# Patient Record
Sex: MALE | Race: WHITE | NOT HISPANIC OR LATINO | Employment: FULL TIME | URBAN - METROPOLITAN AREA
[De-identification: names, ages, dates, MRNs, and addresses within clinical notes are randomized per-mention and may not be internally consistent; named-entity substitution may affect disease eponyms.]

---

## 2017-02-01 ENCOUNTER — OFFICE VISIT (OUTPATIENT)
Dept: CARDIOLOGY | Facility: CLINIC | Age: 57
End: 2017-02-01

## 2017-02-01 VITALS
HEART RATE: 100 BPM | SYSTOLIC BLOOD PRESSURE: 122 MMHG | DIASTOLIC BLOOD PRESSURE: 98 MMHG | WEIGHT: 315 LBS | HEIGHT: 76 IN | BODY MASS INDEX: 38.36 KG/M2

## 2017-02-01 DIAGNOSIS — G47.33 OBSTRUCTIVE SLEEP APNEA SYNDROME: ICD-10-CM

## 2017-02-01 DIAGNOSIS — I10 ESSENTIAL HYPERTENSION: ICD-10-CM

## 2017-02-01 DIAGNOSIS — I48.0 PAF (PAROXYSMAL ATRIAL FIBRILLATION) (HCC): Primary | ICD-10-CM

## 2017-02-01 PROBLEM — E55.9 VITAMIN D DEFICIENCY: Status: ACTIVE | Noted: 2017-02-01

## 2017-02-01 PROBLEM — G47.30 SLEEP APNEA: Status: ACTIVE | Noted: 2017-02-01

## 2017-02-01 PROBLEM — I83.93 VARICOSE VEINS OF LOWER EXTREMITIES: Status: ACTIVE | Noted: 2017-02-01

## 2017-02-01 PROCEDURE — 99214 OFFICE O/P EST MOD 30 MIN: CPT | Performed by: NURSE PRACTITIONER

## 2017-02-01 PROCEDURE — 93000 ELECTROCARDIOGRAM COMPLETE: CPT | Performed by: NURSE PRACTITIONER

## 2017-02-01 RX ORDER — METOPROLOL SUCCINATE 100 MG/1
100 TABLET, EXTENDED RELEASE ORAL 2 TIMES DAILY
Qty: 180 TABLET | Refills: 3 | Status: SHIPPED | OUTPATIENT
Start: 2017-02-01 | End: 2020-12-17

## 2017-02-01 NOTE — PROGRESS NOTES
Date of Office Visit: 2017  Encounter Provider: HARDY Hugo  Place of Service: [facility] CARDIOLOGY  Patient Name: Ramy Reyez  :1960    Chief Complaint   Patient presents with   • Atrial Fibrillation     f/u   :     HPI: Ramy Reyez is a 56 y.o. male comes in today for follow-up for atrial fibrillation.  He is a patient that has been seen by Dr. Valentin and Dr. Yvon Russo and I am seeing him for the first time today. He has a history of atrial fibrillation.  He was first diagnosed in . At first, it was attempted to keep him in normal sinus rhythm with flecainide.  He had many recurrences of his atrial fibrillation, all in the setting of alcohol use.  He went to rehabilitation in 2014 for his alcohol use.  In , Dr. Russo referred the patient to Dr. Valentin for possible ablation.  It was the decision of Dr. Valentin to discontinue his flecainide as it was not keeping him in normal sinus rhythm.  The patient was asymptomatic in atrial fibrillation and rate control with the treatment he recommended.    Today, he comes in, overall feeling well.  He does have palpitations at times.  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  Last night he had to take a break due to shortness of breath and fatigue but this does not occur frequently.  He has edema in his right lower extremity due to varicose veins.  He is going to have some vein removal.  No extremity and the left leg.  Occasional lightheadedness occurs but does not occur with palpitations or shortness of breath.  He denies chest pain, orthopnea or PND.  He does have more fatigue than usual but he does not exercise.  He has not lost weight as recommended.  He is still alcohol free.  He does use CPAP at night and 100% compliant.  He is not 100% compliant with taking  "evening dose of Toprol.  He reports he misses the dose more than he takes.    Past Medical History   Diagnosis Date   • Abnormal liver function test    • Annual physical exam    • Cardiomyopathy    • CPAP (continuous positive airway pressure) dependence    • Drug therapy    • Genital warts    • Health care maintenance    • Hypertension    • PAF (paroxysmal atrial fibrillation)    • Recovering alcoholic    • Sleep apnea        Past Surgical History   Procedure Laterality Date   • Cyst removal Right      hand           Review of Systems   Constitution: Positive for malaise/fatigue. Negative for fever.   HENT: Negative for ear pain, hearing loss, nosebleeds and sore throat.    Eyes: Negative for double vision, pain, vision loss in left eye, vision loss in right eye and visual disturbance.   Cardiovascular: Positive for leg swelling (right leg only) and palpitations. Negative for claudication.   Respiratory: Negative for cough, snoring and wheezing.    Endocrine: Negative for cold intolerance, heat intolerance and polyuria.   Skin: Negative for color change, itching and rash.   Musculoskeletal: Negative for joint pain, joint swelling and muscle cramps.   Gastrointestinal: Negative for abdominal pain, diarrhea, melena, nausea and vomiting.   Genitourinary: Negative for bladder incontinence and hematuria.   Neurological: Negative for excessive daytime sleepiness, dizziness, light-headedness, paresthesias and seizures.   Psychiatric/Behavioral: Negative for depression. The patient is not nervous/anxious.    All other systems reviewed and are negative.    All other systems reviewed and are negative    No Known Allergies    All aspects of family and social history reviewed.          Objective:     Vitals:    02/01/17 0918   BP: 122/98   BP Location: Right arm   Cuff Size: Large Adult   Pulse: 100   Weight: (!) 319 lb (145 kg)   Height: 76\" (193 cm)     Body mass index is 38.83 kg/(m^2).    PHYSICAL EXAM:  Physical Exam "   Constitutional: He is oriented to person, place, and time. He appears well-developed and well-nourished.   HENT:   Head: Normocephalic and atraumatic.   Neck: Neck supple. No JVD present.   Cardiovascular: Normal heart sounds and intact distal pulses.  An irregularly irregular rhythm present. Tachycardia present.    Pulses:       Carotid pulses are 2+ on the right side, and 2+ on the left side.       Radial pulses are 2+ on the right side, and 2+ on the left side.        Dorsalis pedis pulses are 2+ on the right side, and 2+ on the left side.   Pulmonary/Chest: Effort normal and breath sounds normal. No accessory muscle usage. No respiratory distress. He has no rales.   Abdominal: Soft. Normal appearance and bowel sounds are normal. There is no tenderness.   Musculoskeletal: Normal range of motion. He exhibits no edema.   Neurological: He is alert and oriented to person, place, and time.   Skin: Skin is warm, dry and intact. He is not diaphoretic.   Psychiatric: He has a normal mood and affect. His speech is normal and behavior is normal. Judgment and thought content normal. Cognition and memory are normal.         ECG 12 Lead  Date/Time: 2/1/2017 9:55 AM  Performed by: NATHALIE OLIVER  Authorized by: NATHALIE OLIVER   Comparison: compared with previous ECG from 1/13/2016  Similar to previous ECG  Rhythm: atrial fibrillation  Rate: normal  BPM: 100  ST Segments: ST segments normal  T Waves: T waves normal  QRS axis: normal  Other: no other findings  Clinical impression: abnormal ECG  Comments: Indication: Atrial fibrillation                Assessment:       Diagnosis Plan   1. PAF (paroxysmal atrial fibrillation)     2. Obstructive sleep apnea syndrome     3. Essential hypertension          Orders Placed This Encounter   Procedures   • ECG 12 Lead     This order was created via procedure documentation       Current Outpatient Prescriptions   Medication Sig Dispense Refill   • lisinopril-hydrochlorothiazide  (PRINZIDE,ZESTORETIC) 20-12.5 MG per tablet TAKE ONE TABLET BY MOUTH ONCE DAILY 90 tablet 3   • metoprolol succinate XL (TOPROL-XL) 100 MG 24 hr tablet Take 1 tablet by mouth 2 (Two) Times a Day. 180 tablet 3   • rivaroxaban (XARELTO) 20 MG tablet Take 1 tablet by mouth Daily With Dinner. 90 tablet 3     No current facility-administered medications for this visit.             Plan:       Atrial Fibrillation and Atrial Flutter  Assessment  • The patient has persistent atrial fibrillation  • This is non-valvular in etiology  • The patient's CHADS2-VASc score is 1  • A [id number] score of 1 is considered an intermediate risk for a thromboembolic event  • Rivaroxaban prescribed    Plan  • Continue in atrial fibrillation with rate control  • Continue rivaroxaban for antithrombotic therapy, bleeding issues discussed  • Continue beta blocker for rate control  • Pt not taking Toprol XL at night as prescribed. Encouraged patient to set alarm or set dose at night stand by CPAP, which he is compliant with. He will try to do better. This will also help with hypertension. Encouraged weight loss. Praised for no alcohol and CPAP use.     2. FANG-uses CPAP  3. HTN-encouraged to be compliant with Toprol XL    Follow up in office in 1 year.     As always, it has been a pleasure to participate in this patient's care.      Sincerely,      HARDY Hugo

## 2017-08-21 RX ORDER — LISINOPRIL AND HYDROCHLOROTHIAZIDE 20; 12.5 MG/1; MG/1
TABLET ORAL
Qty: 90 TABLET | Refills: 0 | Status: SHIPPED | OUTPATIENT
Start: 2017-08-21 | End: 2019-01-29 | Stop reason: SDUPTHER

## 2019-01-18 ENCOUNTER — TELEPHONE (OUTPATIENT)
Dept: FAMILY MEDICINE CLINIC | Facility: CLINIC | Age: 59
End: 2019-01-18

## 2019-01-18 NOTE — TELEPHONE ENCOUNTER
Dr. Khan please advise this pt medication list is ok but has a few medical problems you will need to review. Thanks

## 2019-01-18 NOTE — TELEPHONE ENCOUNTER
Ramy Maciel wife Génesis Reyez [birth year] is a [facility] patient and Ramy would like to know if Dr. Khan would accept him as a new patient.

## 2019-01-21 RX ORDER — DILTIAZEM HYDROCHLORIDE 60 MG/1
TABLET, FILM COATED ORAL
Qty: 90 TABLET | Refills: 1 | Status: SHIPPED | OUTPATIENT
Start: 2019-01-21 | End: 2019-06-23 | Stop reason: SDUPTHER

## 2019-01-30 RX ORDER — LISINOPRIL AND HYDROCHLOROTHIAZIDE 20; 12.5 MG/1; MG/1
TABLET ORAL
Qty: 90 TABLET | Refills: 0 | Status: SHIPPED | OUTPATIENT
Start: 2019-01-30 | End: 2019-04-28 | Stop reason: SDUPTHER

## 2019-03-06 RX ORDER — RIVAROXABAN 20 MG/1
TABLET, FILM COATED ORAL
Qty: 90 TABLET | Refills: 0 | Status: SHIPPED | OUTPATIENT
Start: 2019-03-06 | End: 2019-06-01 | Stop reason: SDUPTHER

## 2019-03-18 ENCOUNTER — OFFICE VISIT (OUTPATIENT)
Dept: CARDIOLOGY | Facility: CLINIC | Age: 59
End: 2019-03-18

## 2019-03-18 VITALS
HEIGHT: 76 IN | HEART RATE: 91 BPM | SYSTOLIC BLOOD PRESSURE: 134 MMHG | WEIGHT: 315 LBS | DIASTOLIC BLOOD PRESSURE: 86 MMHG | BODY MASS INDEX: 38.36 KG/M2

## 2019-03-18 DIAGNOSIS — I48.19 PERSISTENT ATRIAL FIBRILLATION (HCC): Primary | ICD-10-CM

## 2019-03-18 DIAGNOSIS — I10 ESSENTIAL HYPERTENSION: ICD-10-CM

## 2019-03-18 DIAGNOSIS — E66.01 CLASS 3 SEVERE OBESITY DUE TO EXCESS CALORIES IN ADULT, UNSPECIFIED BMI, UNSPECIFIED WHETHER SERIOUS COMORBIDITY PRESENT (HCC): ICD-10-CM

## 2019-03-18 DIAGNOSIS — G47.33 OBSTRUCTIVE SLEEP APNEA SYNDROME: ICD-10-CM

## 2019-03-18 PROBLEM — E66.813 CLASS 3 SEVERE OBESITY DUE TO EXCESS CALORIES IN ADULT: Status: ACTIVE | Noted: 2019-03-18

## 2019-03-18 PROCEDURE — 93000 ELECTROCARDIOGRAM COMPLETE: CPT | Performed by: NURSE PRACTITIONER

## 2019-03-18 PROCEDURE — 99214 OFFICE O/P EST MOD 30 MIN: CPT | Performed by: NURSE PRACTITIONER

## 2019-03-18 RX ORDER — LORAZEPAM 1 MG/1
1 TABLET ORAL AS NEEDED
Refills: 0 | COMMUNITY
Start: 2019-03-07

## 2019-03-18 RX ORDER — TRAZODONE HYDROCHLORIDE 100 MG/1
TABLET ORAL
Refills: 1 | COMMUNITY
Start: 2019-02-26

## 2019-03-18 RX ORDER — ARIPIPRAZOLE 10 MG/1
10 TABLET ORAL DAILY
Refills: 4 | COMMUNITY
Start: 2019-02-25 | End: 2019-10-16

## 2019-03-18 RX ORDER — ESCITALOPRAM OXALATE 10 MG/1
10 TABLET ORAL DAILY
COMMUNITY
End: 2019-10-16

## 2019-03-18 NOTE — PROGRESS NOTES
Date of Office Visit: 2019  Encounter Provider: HARDY Galicia  Place of Service: [facility] CARDIOLOGY  Patient Name: Ramy Reyez  :1960    Chief Complaint   Patient presents with   • Paroxysmal atrial fibrillation   :     HPI: Ramy Reyez is a 59 y.o. male who is a patient of Dr. Russo and Dr. Valentin's with history of atrial fibrillation which initially was paroxsymal but now persistent and likely permanent. He was treated with flecainide in the past but was discontinued and decided to just pursue rate control and anticoagulation. At that time he was heavily drinking but did go to rehab and has remained sober. He was last seen by HARDY Luna in 2017.  He was scheduled to see Dr. Valentin in 2018 but was a no-show due to he was living in [place] at that time.  He presents today for routine follow-up.     Says he saw a cardiologist in [place] about 8 months ago and he had an echo and a stress test.  They told him that his EF was around 45% and his stress test was normal.  We will of try to obtain those records.  His last echo in our system was in  at that time his EF was around 40% with a dilated LV, marked biatrial enlargement and mild to moderate MR.    Today he reports that physically he is doing pretty well but mentally he struggles at times.  He denies chest pain, dyspnea, PND, orthopnea or dizziness.  He does have some occasional palpitations as well as some intermittent mild lower extremity edema.    He did start exercising a few months ago and rides a stationary bike for about 15 minutes normally 3 times a week.    He has gained 11 pounds since he last saw us in  and is not doing very well with weight loss.    He does have sleep apnea and says he is compliant with his CPAP.     Past Medical History:   Diagnosis Date   • Abnormal liver function test    • Annual physical exam    • Cardiomyopathy (CMS/HCC)    • CPAP (continuous positive  airway pressure) dependence    • Drug therapy    • Genital warts    • Health care maintenance    • Hypertension    • PAF (paroxysmal atrial fibrillation) (CMS/HCC)    • Recovering alcoholic (CMS/HCC)    • Sleep apnea        Past Surgical History:   Procedure Laterality Date   • CYST REMOVAL Right     hand       Social History     Socioeconomic History   • Marital status:      Spouse name: Not on file   • Number of children: Not on file   • Years of education: Not on file   • Highest education level: Not on file   Social Needs   • Financial resource strain: Not on file   • Food insecurity - worry: Not on file   • Food insecurity - inability: Not on file   • Transportation needs - medical: Not on file   • Transportation needs - non-medical: Not on file   Occupational History   • Not on file   Tobacco Use   • Smoking status: Never Smoker   • Tobacco comment: caffeine use   Substance and Sexual Activity   • Alcohol use: No     Comment: recovery alcoholic   • Drug use: Not on file   • Sexual activity: Not on file   Other Topics Concern   • Not on file   Social History Narrative   • Not on file       Family History   Problem Relation Age of Onset   • Cancer Mother         uterine    • COPD Father    • Diabetes Father    • Depression Father    • Heart attack Father    • Alcohol abuse Sister    • Thyroid disease Other        Review of Systems   Constitution: Negative for chills, fever and malaise/fatigue.   Cardiovascular: Positive for palpitations. Negative for chest pain, dyspnea on exertion, leg swelling, near-syncope, orthopnea, paroxysmal nocturnal dyspnea and syncope.   Respiratory: Negative for cough and shortness of breath.    Hematologic/Lymphatic: Negative.    Musculoskeletal: Negative for joint pain, joint swelling and myalgias.   Gastrointestinal: Negative for abdominal pain, diarrhea, melena, nausea and vomiting.   Genitourinary: Negative for frequency and hematuria.   Neurological: Negative for  "light-headedness, numbness, paresthesias and seizures.   Psychiatric/Behavioral: Positive for depression. The patient is nervous/anxious.    Allergic/Immunologic: Negative.    All other systems reviewed and are negative.      No Known Allergies      Current Outpatient Medications:   •  ARIPiprazole (ABILIFY) 10 MG tablet, Take 10 mg by mouth Daily., Disp: , Rfl: 4  •  diltiaZEM (CARDIZEM) 60 MG tablet, TAKE 1 TABLET BY MOUTH 3 TIMES A DAY, Disp: 90 tablet, Rfl: 1  •  escitalopram (LEXAPRO) 10 MG tablet, Take 10 mg by mouth Daily., Disp: , Rfl:   •  lisinopril-hydrochlorothiazide (PRINZIDE,ZESTORETIC) 20-12.5 MG per tablet, TAKE ONE TABLET BY MOUTH DAILY, Disp: 90 tablet, Rfl: 0  •  LORazepam (ATIVAN) 1 MG tablet, Take 1 mg by mouth As Needed., Disp: , Rfl: 0  •  metoprolol succinate XL (TOPROL-XL) 100 MG 24 hr tablet, Take 1 tablet by mouth 2 (Two) Times a Day., Disp: 180 tablet, Rfl: 3  •  traZODone (DESYREL) 100 MG tablet, TAKE 2 TABLET BY MOUTH EVERYDAY AT BEDTIME, Disp: , Rfl: 1  •  XARELTO 20 MG tablet, TAKE 1 TABLET BY MOUTH DAILY WITH DINNER., Disp: 90 tablet, Rfl: 0      Objective:     Vitals:    03/18/19 0916   BP: 134/86   Pulse: 91   Weight: (!) 150 kg (330 lb)   Height: 193 cm (75.98\")     Body mass index is 40.19 kg/m².    PHYSICAL EXAM:    Vitals Reviewed.   General Appearance: No acute distress, obese.   Eyes: Conjunctiva and lids: No erythema, swelling, or discharge. Sclera non-icteric.   HENT: Atraumatic, normocephalic. External eyes, ears, and nose normal.   Respiratory: No signs of respiratory distress. Respiration rhythm and depth normal.   Clear to auscultation. No rales, crackles, rhonchi, or wheezing auscultated.   Cardiovascular:  Jugular Venous Pressure: Normal  Heart Rate and Rhythm: Irregularly, irregular. Heart Sounds: Normal S1 and S2. No S3 or S4 noted.  Murmurs: No murmurs noted. No rubs, thrills, or gallops.   Arterial Pulses:  Posterior tibialis and dorsalis pedis pulses normal. "   Lower Extremities: Trace pre tibial edema, R>L.  Gastrointestinal:  Abdomen soft, obese.    Musculoskeletal: Normal movement of extremities  Skin and Nails: General appearance normal. No pallor, cyanosis, diaphoresis. Skin temperature normal. No clubbing of fingernails.   Psychiatric: Patient alert and oriented to person, place, and time. Speech and behavior appropriate. Normal mood and affect.       ECG 12 Lead  Date/Time: 3/18/2019 9:54 AM  Performed by: Zora Oneill APRN  Authorized by: Zora Oneill APRN   Comparison: compared with previous ECG from 2/1/2017  Similar to previous ECG  Rhythm: atrial fibrillation  BPM: 91    Clinical impression: abnormal EKG              Assessment:       Diagnosis Plan   1. Persistent atrial fibrillation (CMS/[facility])  Holter Monitor - 24 Hour   2. Essential hypertension     3. Obstructive sleep apnea syndrome     4. Class 3 severe obesity due to excess calories in adult, unspecified BMI, unspecified whether serious comorbidity present (CMS/[facility])            Plan:       1. Atrial Fibrillation and Atrial Flutter  Assessment  • The patient has persistent atrial fibrillation  • The patient's CHADS2-VASc score is 1  • A [id number] score of 1 is considered an intermediate risk for a thromboembolic event  • Rivaroxaban prescribed  • Afib, persistent, likely permanent. HR 91 today. He has not had a monitor to evaluate for HR control. Will do a 24 Hr Holter.    Rivaroxaban for AC, no bleeding issues.     Will get records from [place].     He says EF 45% by echo 8 months ago, which is actually a little better than 2008-he is on ACE, diuretic and BB with no symptoms of heart failure.    He is on low dose dilt TID but says he really has minimal trouble remembering to take it TID so will leave it for now since his regimen seems to be working okay.     Plan  • Continue in atrial fibrillation with rate control  • Continue rivaroxaban for antithrombotic therapy, bleeding issues discussed  •  Continue beta blocker and diltiazem for rate control    2. HTN, controlled.     3. FANG, compliant with CPAP.    4. For the problem of overweight/obesity, we discussed the importance of lifestyle measures and strategies for weight loss, such as improved nutrition, regular exercise and sleep hygiene.      Follow up with Dr. Valentin in 1 year or sooner should the need arise. Will call with Holter results when available.     As always, it has been a pleasure to participate in your patient's care.      Sincerely,         HARDY Peterson

## 2019-04-29 RX ORDER — LISINOPRIL AND HYDROCHLOROTHIAZIDE 20; 12.5 MG/1; MG/1
TABLET ORAL
Qty: 90 TABLET | Refills: 0 | Status: SHIPPED | OUTPATIENT
Start: 2019-04-29 | End: 2019-07-27 | Stop reason: SDUPTHER

## 2019-06-03 RX ORDER — RIVAROXABAN 20 MG/1
TABLET, FILM COATED ORAL
Qty: 90 TABLET | Refills: 0 | Status: SHIPPED | OUTPATIENT
Start: 2019-06-03 | End: 2019-08-20 | Stop reason: SDUPTHER

## 2019-06-24 RX ORDER — DILTIAZEM HYDROCHLORIDE 60 MG/1
TABLET, FILM COATED ORAL
Qty: 90 TABLET | Refills: 1 | Status: SHIPPED | OUTPATIENT
Start: 2019-06-24 | End: 2019-09-01 | Stop reason: SDUPTHER

## 2019-07-29 RX ORDER — LISINOPRIL AND HYDROCHLOROTHIAZIDE 20; 12.5 MG/1; MG/1
TABLET ORAL
Qty: 90 TABLET | Refills: 0 | Status: SHIPPED | OUTPATIENT
Start: 2019-07-29 | End: 2019-11-01 | Stop reason: SDUPTHER

## 2019-08-21 RX ORDER — RIVAROXABAN 20 MG/1
TABLET, FILM COATED ORAL
Qty: 90 TABLET | Refills: 0 | Status: SHIPPED | OUTPATIENT
Start: 2019-08-21 | End: 2019-11-15 | Stop reason: SDUPTHER

## 2019-09-03 RX ORDER — DILTIAZEM HYDROCHLORIDE 60 MG/1
TABLET, FILM COATED ORAL
Qty: 90 TABLET | Refills: 1 | Status: SHIPPED | OUTPATIENT
Start: 2019-09-03 | End: 2019-12-04 | Stop reason: SDUPTHER

## 2019-10-16 ENCOUNTER — OFFICE VISIT (OUTPATIENT)
Dept: FAMILY MEDICINE CLINIC | Facility: CLINIC | Age: 59
End: 2019-10-16

## 2019-10-16 VITALS
DIASTOLIC BLOOD PRESSURE: 86 MMHG | RESPIRATION RATE: 16 BRPM | WEIGHT: 315 LBS | SYSTOLIC BLOOD PRESSURE: 110 MMHG | BODY MASS INDEX: 38.36 KG/M2 | HEIGHT: 76 IN | TEMPERATURE: 98.1 F | OXYGEN SATURATION: 98 % | HEART RATE: 74 BPM

## 2019-10-16 DIAGNOSIS — R39.11 URINARY HESITANCY: ICD-10-CM

## 2019-10-16 DIAGNOSIS — Z00.00 ANNUAL PHYSICAL EXAM: Primary | ICD-10-CM

## 2019-10-16 DIAGNOSIS — Z12.11 SCREENING FOR COLON CANCER: ICD-10-CM

## 2019-10-16 DIAGNOSIS — R73.9 HYPERGLYCEMIA: ICD-10-CM

## 2019-10-16 DIAGNOSIS — Z86.79 HISTORY OF HEART FAILURE: ICD-10-CM

## 2019-10-16 DIAGNOSIS — R32 URINARY INCONTINENCE, UNSPECIFIED TYPE: ICD-10-CM

## 2019-10-16 DIAGNOSIS — I48.20 ATRIAL FIBRILLATION, CHRONIC (HCC): ICD-10-CM

## 2019-10-16 DIAGNOSIS — R53.83 FATIGUE, UNSPECIFIED TYPE: ICD-10-CM

## 2019-10-16 DIAGNOSIS — R39.15 URINARY URGENCY: ICD-10-CM

## 2019-10-16 DIAGNOSIS — R35.89 POLYURIA: ICD-10-CM

## 2019-10-16 PROBLEM — F32.A DEPRESSION: Status: ACTIVE | Noted: 2019-10-16

## 2019-10-16 PROCEDURE — 99214 OFFICE O/P EST MOD 30 MIN: CPT | Performed by: FAMILY MEDICINE

## 2019-10-16 RX ORDER — DESVENLAFAXINE 100 MG/1
100 TABLET, EXTENDED RELEASE ORAL DAILY
COMMUNITY
End: 2021-01-19

## 2019-10-16 NOTE — PATIENT INSTRUCTIONS
Lab work and urinalysis ordered, will call with results. Please be fasted for 8 hours prior to getting lab work done.   Cologard ordered.   Will attempt to obtain most recent echocardiogram results.   Follow up with your cardiologist as scheduled.  Return to clinic in 6 months for routine follow up.

## 2019-10-16 NOTE — PROGRESS NOTES
Subjective   Ramy Reyez is a 59 y.o. male.     No chief complaint on file.      History of Present Illness   Patient has a PMH of morbid obesity, chronic atrial fibrillation, CHF (EF 40% in 2008, with dilated and hypertrophic LV, markedly dilated RA and LA, mild-mod increased PAP), alcoholism, depression and bipolar disorder.     He has been on psychiatric medications for many years. Had a relapse of alcoholism a few weeks ago. He had been sober for 8 months prior to that.   He recently started an IOP program at [facility] last week.  His Psychiatrist is Nolvia Ruiz, who he sees once every month or two, but lately he has been seeing him weekly. He also sees a therapist every two weeks - Bladimir Walters.     He notified his psychiatrist of lethargy for the past few months. He believes it is work related. Reports a lot of stress at work and was told his position would be eliminated by the end of the year, and that his work performance had been falling. So he is off on short term disability right now.     He sees his cardiologist every 6 months to once a year.  His last echocardiogram was done in [place] several years ago and report was faxed to his current cardiologist, per patient.   Admits to CP- a midsternal, mild, dull, annoying pain that seems to happen randomly, at rest or when active, for the past few months. No SOB, diaphoresis, tingling/numbness, aggravating or alleviating factors. Denies history of reflux.  Unsure when his last stress test was. Declines any further testing at this time due to financial concerns. Requested that patient keep track of the pain and if he notices any change or worsening of the pain to be seen immediately. Patient verbalized understanding.     He complains of polyuria, urgency, urinating small amounts, and urinary incontinence. This has been going on for a long time. In the past he would change his clothes at work, but now he takes precaution by using the restroom frequently and  he intentionally does not drink much water.     He is from [place], [place], has lived in [place] for the past 13 years. He is a . He lives with his wife at home- they have one daughter age 20 yrs in college at [employer].     He has never smoked. No recent illicit drug use- years ago.   His mother passed away at age 54 yrs due to uterine cancer.   Father: DM, depression, may have had heart disease/MI but patient not sure because his father was not a reliable source. He lived to be in his 80's.    He had colonoscopy 5-6 years ago- recommended repeat in 10 years. He is interested in cologard.    The following portions of the patient's history were reviewed and updated as appropriate: allergies, current medications, past family history, past medical history, past social history, past surgical history and problem list.    Review of Systems   Constitutional: Positive for activity change (Lethargic for months. Lots of sleeping. ) and fatigue. Negative for fever.   HENT: Negative for congestion, sinus pressure, sore throat, swollen glands and trouble swallowing.    Respiratory: Positive for chest tightness (When he has anxiety).    Cardiovascular: Positive for chest pain, palpitations (Occasionally) and leg swelling.   Gastrointestinal: Positive for nausea (During panic attacks). Negative for abdominal pain, constipation, diarrhea, vomiting, GERD and indigestion.   Endocrine: Positive for polyuria (for the past year).   Genitourinary: Positive for frequency, urgency and urinary incontinence (for the past year). Negative for difficulty urinating.   Musculoskeletal: Negative for back pain.   Neurological: Negative for dizziness and headache.   Psychiatric/Behavioral: Negative for depressed mood.       Objective   Vitals:    10/16/19 1245   BP: 110/86   Pulse: 74   Resp: 16   Temp: 98.1 °F (36.7 °C)   SpO2: 98%       Physical Exam   Constitutional: He appears well-developed and well-nourished. No  distress.   Pleasant, morbidly obese male.   HENT:   Head: Normocephalic.   Mouth/Throat: Oropharynx is clear and moist.   Eyes: Conjunctivae and EOM are normal. Pupils are equal, round, and reactive to light.   Neck: Normal range of motion. Neck supple.   Cardiovascular: Normal rate, normal heart sounds and intact distal pulses.   No murmur heard.  Rhythm is irregularly irregular.   1 to 2+ pitting edema bilaterally, up to the knees.  Venous stasis changes bilaterally.   Pulmonary/Chest: Effort normal and breath sounds normal. No respiratory distress. He has no wheezes. He has no rales.   Abdominal: Soft. Bowel sounds are normal. He exhibits no distension. There is no tenderness. There is no rebound and no guarding.   Musculoskeletal: Normal range of motion.   Lymphadenopathy:     He has no cervical adenopathy.   Neurological: He is alert.   Skin: Skin is warm and dry. Capillary refill takes less than 2 seconds.   Psychiatric: He has a normal mood and affect.   Nursing note and vitals reviewed.        Assessment/Plan   Diagnoses and all orders for this visit:    Annual physical exam  -     CBC & Differential; Future  -     Comprehensive Metabolic Panel; Future  -     Lipid Panel; Future  -     Hepatitis C Antibody; Future  -     Hepatitis C Antibody  -     Lipid Panel  -     Comprehensive Metabolic Panel  -     CBC & Differential    Urinary hesitancy  -     PSA Screen; Future  -     Urinalysis With Culture If Indicated -; Future  -     Urinalysis With Culture If Indicated -  -     PSA Screen    Hyperglycemia  -     Hemoglobin A1c; Future  -     Hemoglobin A1c    Fatigue, unspecified type  -     TSH; Future  -     TSH    Screening for colon cancer  -     Cologuard - Stool, Per Rectum; Future  -     Cologuard - Stool, Per Rectum    Polyuria  -     Urinalysis With Culture If Indicated -; Future  -     Urinalysis With Culture If Indicated -    History of heart failure    Atrial fibrillation, chronic    Urinary  urgency    Urinary incontinence, unspecified type    Other orders  -     Cancel: Tdap Vaccine Greater Than or Equal To 8yo IM            Patient Instructions   Lab work and urinalysis ordered, will call with results. Please be fasted for 8 hours prior to getting lab work done.   Cologard ordered.   Will attempt to obtain most recent echocardiogram results.   Follow up with your cardiologist as scheduled.  Return to clinic in 6 months for routine follow up.

## 2019-10-17 LAB
ALBUMIN SERPL-MCNC: 4.8 G/DL (ref 3.5–5.2)
ALBUMIN/GLOB SERPL: 2.4 G/DL
ALP SERPL-CCNC: 65 U/L (ref 39–117)
ALT SERPL-CCNC: 28 U/L (ref 1–41)
APPEARANCE UR: CLEAR
AST SERPL-CCNC: 25 U/L (ref 1–40)
BACTERIA #/AREA URNS HPF: NORMAL /HPF
BASOPHILS # BLD AUTO: (no result) 10*3/UL
BILIRUB SERPL-MCNC: 1 MG/DL (ref 0.2–1.2)
BILIRUB UR QL STRIP: NEGATIVE
BUN SERPL-MCNC: 12 MG/DL (ref 8–23)
BUN/CREAT SERPL: 12.1 (ref 7–25)
CALCIUM SERPL-MCNC: 9.6 MG/DL (ref 8.6–10.5)
CHLORIDE SERPL-SCNC: 100 MMOL/L (ref 98–107)
CHOLEST SERPL-MCNC: 129 MG/DL (ref 0–200)
CO2 SERPL-SCNC: 28.6 MMOL/L (ref 22–29)
COLOR UR: YELLOW
CREAT SERPL-MCNC: 0.99 MG/DL (ref 0.76–1.27)
DIFFERENTIAL COMMENT: ABNORMAL
EOSINOPHIL # BLD AUTO: (no result) 10*3/UL
EOSINOPHIL # BLD MANUAL: 0.07 10*3/MM3 (ref 0–0.4)
EOSINOPHIL NFR BLD AUTO: (no result) %
EOSINOPHIL NFR BLD MANUAL: 1 % (ref 0.3–6.2)
EPI CELLS #/AREA URNS HPF: NORMAL /HPF
ERYTHROCYTE [DISTWIDTH] IN BLOOD BY AUTOMATED COUNT: 13.1 % (ref 12.3–15.4)
GLOBULIN SER CALC-MCNC: 2 GM/DL
GLUCOSE SERPL-MCNC: 93 MG/DL (ref 65–99)
GLUCOSE UR QL: NEGATIVE
HBA1C MFR BLD: 5.9 % (ref 4.8–5.6)
HCT VFR BLD AUTO: 48.6 % (ref 37.5–51)
HCV AB S/CO SERPL IA: <0.1 S/CO RATIO (ref 0–0.9)
HDLC SERPL-MCNC: 42 MG/DL (ref 40–60)
HGB BLD-MCNC: 16.4 G/DL (ref 13–17.7)
HGB UR QL STRIP: NEGATIVE
KETONES UR QL STRIP: NEGATIVE
LDLC SERPL CALC-MCNC: 69 MG/DL (ref 0–100)
LEUKOCYTE ESTERASE UR QL STRIP: NEGATIVE
LYMPHOCYTES # BLD AUTO: (no result) 10*3/UL
LYMPHOCYTES # BLD MANUAL: 2.59 10*3/MM3 (ref 0.7–3.1)
LYMPHOCYTES NFR BLD AUTO: (no result) %
LYMPHOCYTES NFR BLD MANUAL: 38.6 % (ref 19.6–45.3)
MCH RBC QN AUTO: 30.8 PG (ref 26.6–33)
MCHC RBC AUTO-ENTMCNC: 33.7 G/DL (ref 31.5–35.7)
MCV RBC AUTO: 91.2 FL (ref 79–97)
MICRO URNS: NORMAL
MICRO URNS: NORMAL
MONOCYTES # BLD MANUAL: 0.27 10*3/MM3 (ref 0.1–0.9)
MONOCYTES NFR BLD AUTO: (no result) %
MONOCYTES NFR BLD MANUAL: 4 % (ref 5–12)
MUCOUS THREADS URNS QL MICRO: PRESENT /HPF
NEUTROPHILS # BLD MANUAL: 3.78 10*3/MM3 (ref 1.7–7)
NEUTROPHILS NFR BLD AUTO: (no result) %
NEUTROPHILS NFR BLD MANUAL: 56.4 % (ref 42.7–76)
NITRITE UR QL STRIP: NEGATIVE
PH UR STRIP: 5.5 [PH] (ref 5–7.5)
PLATELET # BLD AUTO: 149 10*3/MM3 (ref 140–450)
PLATELET BLD QL SMEAR: ABNORMAL
POTASSIUM SERPL-SCNC: 4.2 MMOL/L (ref 3.5–5.2)
PROT SERPL-MCNC: 6.8 G/DL (ref 6–8.5)
PROT UR QL STRIP: NEGATIVE
PSA SERPL-MCNC: 0.67 NG/ML (ref 0–4)
RBC # BLD AUTO: 5.33 10*6/MM3 (ref 4.14–5.8)
RBC #/AREA URNS HPF: NORMAL /HPF
RBC MORPH BLD: ABNORMAL
SODIUM SERPL-SCNC: 142 MMOL/L (ref 136–145)
SP GR UR: 1.01 (ref 1–1.03)
TRIGL SERPL-MCNC: 92 MG/DL (ref 0–150)
TSH SERPL DL<=0.005 MIU/L-ACNC: 3.94 UIU/ML (ref 0.27–4.2)
URINALYSIS REFLEX: NORMAL
UROBILINOGEN UR STRIP-MCNC: 0.2 MG/DL (ref 0.2–1)
VLDLC SERPL CALC-MCNC: 18.4 MG/DL
WBC # BLD AUTO: 6.71 10*3/MM3 (ref 3.4–10.8)
WBC #/AREA URNS HPF: NORMAL /HPF

## 2019-10-28 ENCOUNTER — TELEPHONE (OUTPATIENT)
Dept: FAMILY MEDICINE CLINIC | Facility: CLINIC | Age: 59
End: 2019-10-28

## 2019-10-28 NOTE — TELEPHONE ENCOUNTER
Patient is calling to see if his cologuard results are back yet. His phone number is   [phone].

## 2019-10-28 NOTE — TELEPHONE ENCOUNTER
Pt has been called [facility], Advised pt Per Dr. Blade Cullen is Negative. Pt will call back with any questions.

## 2019-11-01 RX ORDER — LISINOPRIL AND HYDROCHLOROTHIAZIDE 20; 12.5 MG/1; MG/1
TABLET ORAL
Qty: 90 TABLET | Refills: 0 | Status: SHIPPED | OUTPATIENT
Start: 2019-11-01 | End: 2020-01-24

## 2019-11-15 RX ORDER — RIVAROXABAN 20 MG/1
TABLET, FILM COATED ORAL
Qty: 90 TABLET | Refills: 0 | Status: SHIPPED | OUTPATIENT
Start: 2019-11-15 | End: 2020-02-17

## 2019-12-04 RX ORDER — DILTIAZEM HYDROCHLORIDE 60 MG/1
TABLET, FILM COATED ORAL
Qty: 90 TABLET | Refills: 1 | Status: SHIPPED | OUTPATIENT
Start: 2019-12-04 | End: 2020-03-02

## 2020-01-24 RX ORDER — LISINOPRIL AND HYDROCHLOROTHIAZIDE 20; 12.5 MG/1; MG/1
TABLET ORAL
Qty: 90 TABLET | Refills: 0 | Status: SHIPPED | OUTPATIENT
Start: 2020-01-24 | End: 2020-04-09

## 2020-02-17 RX ORDER — RIVAROXABAN 20 MG/1
TABLET, FILM COATED ORAL
Qty: 90 TABLET | Refills: 0 | Status: SHIPPED | OUTPATIENT
Start: 2020-02-17 | End: 2020-05-19

## 2020-03-02 RX ORDER — DILTIAZEM HYDROCHLORIDE 60 MG/1
TABLET, FILM COATED ORAL
Qty: 90 TABLET | Refills: 5 | Status: SHIPPED | OUTPATIENT
Start: 2020-03-02 | End: 2020-06-26

## 2020-03-20 RX ORDER — METOPROLOL TARTRATE 100 MG/1
TABLET ORAL
Qty: 180 TABLET | Refills: 0 | Status: SHIPPED | OUTPATIENT
Start: 2020-03-20 | End: 2020-06-11

## 2020-04-09 RX ORDER — LISINOPRIL AND HYDROCHLOROTHIAZIDE 20; 12.5 MG/1; MG/1
TABLET ORAL
Qty: 90 TABLET | Refills: 0 | Status: SHIPPED | OUTPATIENT
Start: 2020-04-09 | End: 2020-07-02

## 2020-05-19 RX ORDER — RIVAROXABAN 20 MG/1
TABLET, FILM COATED ORAL
Qty: 90 TABLET | Refills: 1 | Status: SHIPPED | OUTPATIENT
Start: 2020-05-19 | End: 2020-11-18 | Stop reason: SDUPTHER

## 2020-06-03 ENCOUNTER — TELEPHONE (OUTPATIENT)
Dept: FAMILY MEDICINE CLINIC | Facility: CLINIC | Age: 60
End: 2020-06-03

## 2020-06-03 DIAGNOSIS — R73.9 HYPERGLYCEMIA: Primary | ICD-10-CM

## 2020-06-04 ENCOUNTER — LAB (OUTPATIENT)
Dept: LAB | Facility: HOSPITAL | Age: 60
End: 2020-06-04

## 2020-06-04 ENCOUNTER — RESULTS ENCOUNTER (OUTPATIENT)
Dept: FAMILY MEDICINE CLINIC | Facility: CLINIC | Age: 60
End: 2020-06-04

## 2020-06-04 DIAGNOSIS — R73.9 HYPERGLYCEMIA: ICD-10-CM

## 2020-06-04 LAB — HBA1C MFR BLD: 5.72 % (ref 4.8–5.6)

## 2020-06-04 PROCEDURE — 83036 HEMOGLOBIN GLYCOSYLATED A1C: CPT | Performed by: FAMILY MEDICINE

## 2020-06-04 PROCEDURE — 36415 COLL VENOUS BLD VENIPUNCTURE: CPT | Performed by: FAMILY MEDICINE

## 2020-06-11 RX ORDER — METOPROLOL TARTRATE 100 MG/1
TABLET ORAL
Qty: 180 TABLET | Refills: 0 | Status: SHIPPED | OUTPATIENT
Start: 2020-06-11 | End: 2020-09-08

## 2020-06-17 ENCOUNTER — OFFICE VISIT (OUTPATIENT)
Dept: FAMILY MEDICINE CLINIC | Facility: CLINIC | Age: 60
End: 2020-06-17

## 2020-06-17 VITALS
BODY MASS INDEX: 38.36 KG/M2 | OXYGEN SATURATION: 98 % | SYSTOLIC BLOOD PRESSURE: 127 MMHG | HEART RATE: 74 BPM | DIASTOLIC BLOOD PRESSURE: 85 MMHG | TEMPERATURE: 95.6 F | WEIGHT: 315 LBS | HEIGHT: 76 IN

## 2020-06-17 DIAGNOSIS — K64.9 HEMORRHOIDS, UNSPECIFIED HEMORRHOID TYPE: ICD-10-CM

## 2020-06-17 DIAGNOSIS — R73.9 HYPERGLYCEMIA: Primary | ICD-10-CM

## 2020-06-17 DIAGNOSIS — F33.41 RECURRENT MAJOR DEPRESSIVE DISORDER, IN PARTIAL REMISSION (HCC): ICD-10-CM

## 2020-06-17 DIAGNOSIS — E66.01 CLASS 3 SEVERE OBESITY DUE TO EXCESS CALORIES WITH BODY MASS INDEX (BMI) OF 40.0 TO 44.9 IN ADULT, UNSPECIFIED WHETHER SERIOUS COMORBIDITY PRESENT (HCC): ICD-10-CM

## 2020-06-17 DIAGNOSIS — F41.9 ANXIETY: ICD-10-CM

## 2020-06-17 PROCEDURE — 99213 OFFICE O/P EST LOW 20 MIN: CPT | Performed by: FAMILY MEDICINE

## 2020-06-17 RX ORDER — HYDROCORTISONE 25 MG/G
CREAM TOPICAL
Qty: 28 G | Refills: 1 | Status: SHIPPED | OUTPATIENT
Start: 2020-06-17

## 2020-06-17 NOTE — PATIENT INSTRUCTIONS
Patient Instructions    Hydrocortisone cream prescribed.  Keep up the good work with walking and eating healthy!  Follow up with psychiatrist as scheduled next week.   Recommend Shingrix vaccination- can get at pharmacy  Return to clinic in 6 months for annual exam, sooner if needed.

## 2020-06-17 NOTE — PROGRESS NOTES
"Subjective   Ramy Reyez is a 60 y.o. male.     Chief Complaint   Patient presents with   • Hypertension     F/U       History of Present Illness   Ramy presents for follow up for hyperglycemia and morbid obesity.  He has successfully lost 13 lbs by walking and making healthier diet choices. His Hgb A1c decreased from 5.90 to 5.72.  Ramy lost his job due to pandemic and is and getting \"very little\" unemployment. No current job prospects.   He suffers from depression so he has been down lately. No SI. He is sleeping less than normal- has hard time falling asleep. Feeling overwhelmed also with anxiety.  Denies CP, SOB.  He walks 1.5 miles about 3 times per week.   He sees a psychiatrist, he has an apt next week. Has been taking his meds regularly. He has taken Ativan on average TID for up to 3 years.     He states his hemorrhoids have been bothering him and he has had BRBPR. He denies pain.   Had colonoscopy within the past 5 years and had negative cologard 10/2019.    Past Medical History:   Diagnosis Date   • Abnormal liver function test    • Annual physical exam    • Cardiomyopathy (CMS/HCC)    • CPAP (continuous positive airway pressure) dependence    • Depression    • Drug therapy    • Genital warts    • Health care maintenance    • Hypertension    • PAF (paroxysmal atrial fibrillation) (CMS/HCC)    • Recovering alcoholic (CMS/HCC)    • Sleep apnea      Past Surgical History:   Procedure Laterality Date   • CYST REMOVAL Right     hand     Social History     Tobacco Use   • Smoking status: Never Smoker   • Smokeless tobacco: Never Used   • Tobacco comment: caffeine use   Substance Use Topics   • Alcohol use: No     Comment: recovery alcoholic   • Drug use: Not on file     Family History   Problem Relation Age of Onset   • Cancer Mother         uterine    • COPD Father    • Diabetes Father    • Depression Father    • Heart attack Father    • Alcohol abuse Sister    • Thyroid disease Other        Review of " "Systems   Constitutional: Negative for activity change, appetite change, chills, fatigue, fever, unexpected weight gain and unexpected weight loss.   Respiratory: Negative for cough, chest tightness and shortness of breath.    Cardiovascular: Negative for chest pain, palpitations and leg swelling.   Gastrointestinal: Negative for abdominal pain, constipation and diarrhea.   Neurological: Negative for dizziness, numbness and headache.   Psychiatric/Behavioral: Positive for sleep disturbance and depressed mood. Negative for suicidal ideas. The patient is nervous/anxious.        Objective   /85   Pulse 74   Temp 95.6 °F (35.3 °C) (Tympanic)   Ht 193 cm (75.98\")   Wt (!) 149 kg (329 lb 8 oz)   SpO2 98%   BMI 40.12 kg/m²     Physical Exam   Constitutional: He appears well-developed and well-nourished. No distress.   Very pleasant, morbidly obese male.   HENT:   Head: Normocephalic and atraumatic.   Right Ear: External ear normal.   Left Ear: External ear normal.   Nose: Nose normal.   Mouth/Throat: Oropharynx is clear and moist.   Eyes: Conjunctivae are normal.   Cardiovascular: Normal rate, regular rhythm, normal heart sounds and intact distal pulses.   Pulmonary/Chest: Effort normal and breath sounds normal. No stridor. No respiratory distress. He has no wheezes. He has no rales.   Neurological: He is alert.   Psychiatric: He has a normal mood and affect.   Vitals reviewed.      Procedures    Assessment/Plan   Ramy was seen today for hypertension.    Diagnoses and all orders for this visit:    Hyperglycemia    Class 3 severe obesity due to excess calories with body mass index (BMI) of 40.0 to 44.9 in adult, unspecified whether serious comorbidity present (CMS/[facility])    Hemorrhoids, unspecified hemorrhoid type  -     Hydrocortisone, Perianal, (ANUSOL-HC) 2.5 % rectal cream; Apply sparingly to hemorrhoids up to twice daily.    Anxiety    Recurrent major depressive disorder, in partial remission (CMS/[facility])     "      Hydrocortisone cream prescribed for hemorrhoids.  Encouraged him to keep up the good work with walking and eating healthy- recommended increasing frequency or walks to 5+ days/week  He will follow up with psychiatrist as scheduled next week. Recommended he discuss possibility of weaning off Ativan with his psychiatrist due to dependency risk and increased risk in the elderly- He is agreeable to this.  Recommend Shingrix vaccination- can get at pharmacy  Return to clinic in 6 months for annual exam, sooner if needed.         Patient Instructions    Hydrocortisone cream prescribed.  Keep up the good work with walking and eating healthy!  Follow up with psychiatrist as scheduled next week.   Recommend Shingrix vaccination- can get at pharmacy  Return to clinic in 6 months for annual exam, sooner if needed.

## 2020-06-24 PROBLEM — F41.9 ANXIETY: Status: ACTIVE | Noted: 2020-06-24

## 2020-06-26 RX ORDER — DILTIAZEM HYDROCHLORIDE 60 MG/1
TABLET, FILM COATED ORAL
Qty: 270 TABLET | Refills: 1 | Status: SHIPPED | OUTPATIENT
Start: 2020-06-26 | End: 2021-01-25

## 2020-07-02 RX ORDER — LISINOPRIL AND HYDROCHLOROTHIAZIDE 20; 12.5 MG/1; MG/1
TABLET ORAL
Qty: 90 TABLET | Refills: 0 | Status: SHIPPED | OUTPATIENT
Start: 2020-07-02 | End: 2020-09-24

## 2020-09-08 RX ORDER — METOPROLOL TARTRATE 100 MG/1
TABLET ORAL
Qty: 180 TABLET | Refills: 0 | Status: SHIPPED | OUTPATIENT
Start: 2020-09-08 | End: 2020-12-17

## 2020-09-24 RX ORDER — LISINOPRIL AND HYDROCHLOROTHIAZIDE 20; 12.5 MG/1; MG/1
TABLET ORAL
Qty: 90 TABLET | Refills: 0 | Status: SHIPPED | OUTPATIENT
Start: 2020-09-24 | End: 2020-12-23

## 2020-11-18 ENCOUNTER — TELEPHONE (OUTPATIENT)
Dept: CARDIOLOGY | Facility: CLINIC | Age: 60
End: 2020-11-18

## 2020-12-17 RX ORDER — METOPROLOL TARTRATE 100 MG/1
TABLET ORAL
Qty: 180 TABLET | Refills: 0 | Status: SHIPPED | OUTPATIENT
Start: 2020-12-17 | End: 2021-03-17

## 2020-12-23 RX ORDER — LISINOPRIL AND HYDROCHLOROTHIAZIDE 20; 12.5 MG/1; MG/1
TABLET ORAL
Qty: 90 TABLET | Refills: 0 | Status: SHIPPED | OUTPATIENT
Start: 2020-12-23 | End: 2021-03-26

## 2021-01-19 ENCOUNTER — OFFICE VISIT (OUTPATIENT)
Dept: FAMILY MEDICINE CLINIC | Facility: CLINIC | Age: 61
End: 2021-01-19

## 2021-01-19 VITALS
HEART RATE: 79 BPM | SYSTOLIC BLOOD PRESSURE: 116 MMHG | DIASTOLIC BLOOD PRESSURE: 75 MMHG | OXYGEN SATURATION: 97 % | WEIGHT: 315 LBS | BODY MASS INDEX: 38.36 KG/M2 | TEMPERATURE: 97.3 F | HEIGHT: 76 IN

## 2021-01-19 DIAGNOSIS — Z71.3 DIETARY COUNSELING: ICD-10-CM

## 2021-01-19 DIAGNOSIS — R73.9 HYPERGLYCEMIA: ICD-10-CM

## 2021-01-19 DIAGNOSIS — Z12.5 SCREENING FOR MALIGNANT NEOPLASM OF PROSTATE: ICD-10-CM

## 2021-01-19 DIAGNOSIS — E66.01 MORBIDLY OBESE (HCC): Chronic | ICD-10-CM

## 2021-01-19 DIAGNOSIS — R35.0 URINARY FREQUENCY: ICD-10-CM

## 2021-01-19 DIAGNOSIS — Z71.82 EXERCISE COUNSELING: ICD-10-CM

## 2021-01-19 DIAGNOSIS — Z13.220 SCREENING FOR LIPOID DISORDERS: ICD-10-CM

## 2021-01-19 DIAGNOSIS — I10 ESSENTIAL HYPERTENSION: Primary | Chronic | ICD-10-CM

## 2021-01-19 PROCEDURE — 99214 OFFICE O/P EST MOD 30 MIN: CPT | Performed by: NURSE PRACTITIONER

## 2021-01-19 PROCEDURE — 90732 PPSV23 VACC 2 YRS+ SUBQ/IM: CPT | Performed by: NURSE PRACTITIONER

## 2021-01-19 PROCEDURE — 90471 IMMUNIZATION ADMIN: CPT | Performed by: NURSE PRACTITIONER

## 2021-01-19 RX ORDER — ESCITALOPRAM OXALATE 20 MG/1
20 TABLET ORAL DAILY
COMMUNITY
Start: 2020-11-12

## 2021-01-19 RX ORDER — ARIPIPRAZOLE 5 MG/1
5 TABLET ORAL DAILY
COMMUNITY
Start: 2021-01-15

## 2021-01-19 NOTE — PROGRESS NOTES
Chief Complaint  Hypertension (6 mth F/U ), Depression, and Anxiety    Subjective          Ramy Reyez presents to [facility] for   History of Present Illness     New patient to me.  He is here to transfer care from previous PCP for hypertension.  He also has a new complaint of urinary frequency.    He had been seeing cardiology every 6 months for history of chronic A. fib, CHF.  But due to insurance changes he has not been there in the last year although they have been refilling his medications.    He reports his blood pressure has been good.  He has not noticed any rapid heartbeat.  He is taking and tolerating his Xarelto well.  He denies any chest pain, heart palpitations.  He denies any GI bleeding other than he sometimes gets some bright red blood when he wipes but he has had a history of hemorrhoids.  He has not noticed any blood recently.  He denies any hematuria.    He has a long history of anxiety and depression and is followed by psychiatry.  He has had many medication changes but seems to be doing well on his current medications.  He is able to sleep and manage stressors currently.  He has not had any suicidal ideation in over a year.  He does need to take his Ativan 2-3 times per day to manage his anxiety.    He has a new complaint of urinary frequency.  He reports this for the last couple months and may be increasing in the past couple weeks.  He has a family history of diabetes and is concerned he may have diabetes.    He admits history of alcoholism.  He has been in rehab several times.  He reports he is currently clean and sober. Denies cravings.     He is , monogamous.  He has a young adult daughter currently in college.    His weight has been up and down.  He does try to get in some exercise but that has been difficult due to Covid.  He admits he has again gained some weight and is planning to begin working on this.    He denies any dyspnea or cough,  "wheezing.  He has been diagnosed with FANG and uses a CPAP.    Denies any sore throat or difficulty swallowing.  He has no abdominal pain, nausea, vomiting.  He is not more thirsty than he thinks he should be but does have polyuria.  He tries to limit his water intake so that he does not have to urinate sometimes per day at work.  He has had no recent changes in vision.  He has no wounds that do not heal, no abnormal bleeding.  He denies penile discharge or testicular pain.    He denies personal history of prostate cancer.  He does not know his family history.    Objective   Vital Signs:   /75   Pulse 79   Temp 97.3 °F (36.3 °C)   Ht 193 cm (75.98\")   Wt (!) 154 kg (340 lb)   SpO2 97%   BMI 41.40 kg/m²     Physical Exam  Vitals signs and nursing note reviewed.   Constitutional:       General: He is not in acute distress.     Appearance: He is well-developed. He is obese. He is not ill-appearing or diaphoretic.   HENT:      Head: Normocephalic and atraumatic.      Right Ear: Tympanic membrane, ear canal and external ear normal.      Left Ear: Tympanic membrane, ear canal and external ear normal.      Mouth/Throat:      Mouth: Mucous membranes are moist.      Pharynx: No posterior oropharyngeal erythema.   Eyes:      General: No scleral icterus.        Right eye: No discharge.         Left eye: No discharge.      Extraocular Movements: Extraocular movements intact.      Conjunctiva/sclera: Conjunctivae normal.      Pupils: Pupils are equal, round, and reactive to light.   Neck:      Musculoskeletal: Neck supple.      Thyroid: No thyromegaly.   Cardiovascular:      Rate and Rhythm: Normal rate and regular rhythm.      Pulses: Normal pulses.   Pulmonary:      Effort: Pulmonary effort is normal.      Breath sounds: Normal breath sounds.   Abdominal:      General: Bowel sounds are normal.      Palpations: Abdomen is soft.      Tenderness: There is no abdominal tenderness. There is no right CVA tenderness or " left CVA tenderness.      Comments: Exam limited by body habitus   Musculoskeletal:         General: No deformity.      Comments: Gait smooth and steady   Lymphadenopathy:      Cervical: No cervical adenopathy.   Skin:     General: Skin is warm and dry.   Neurological:      General: No focal deficit present.      Mental Status: He is alert and oriented to person, place, and time.   Psychiatric:         Mood and Affect: Mood normal.         Behavior: Behavior normal.        Result Review :   The following data was reviewed by: HARDY Ortega on 01/19/2021:  CMP    CMP 1/19/21   Glucose 123 (A)   BUN 19   Creatinine 1.09   eGFR Non  Am 73   eGFR African Am 84   Sodium 140   Potassium 4.0   Chloride 99   Calcium 9.6   Total Protein 6.5   Albumin 4.6   Globulin 1.9   Total Bilirubin 1.3 (A)   Alkaline Phosphatase 68   AST (SGOT) 36   ALT (SGPT) 36   (A) Abnormal value            CBC    CBC 1/19/21   WBC 7.7   RBC 5.25   Hemoglobin 15.9   Hematocrit 48.3   MCV 92   MCH 30.3   MCHC 32.9   RDW 13.2   Platelets 177           Lipid Panel    Lipid Panel 1/19/21   Total Cholesterol 113   Triglycerides 153 (A)   HDL Cholesterol 36 (A)   VLDL Cholesterol 26   LDL Cholesterol  51   LDL/HDL Ratio 1.4   (A) Abnormal value       Comments are available for some flowsheets but are not being displayed.           Most Recent A1C    HGBA1C Most Recent 1/19/21   Hemoglobin A1C 5.8 (A)   (A) Abnormal value       Comments are available for some flowsheets but are not being displayed.           Data reviewed: last PCP note from 6/2020          Assessment and Plan    Problem List Items Addressed This Visit        Cardiac and Vasculature    Essential hypertension - Primary    Relevant Orders    Comprehensive metabolic panel (Completed)    CBC and Differential (Completed)    Microalbumin / Creatinine Urine Ratio - Urine, Clean Catch (Completed)       Endocrine and Metabolic    Hyperglycemia    Relevant Orders    Hemoglobin A1c  (Completed)      Other Visit Diagnoses     Urinary frequency        Relevant Orders    PSA Screen (Completed)    Urinalysis With Culture If Indicated - Urine, Clean Catch (Completed)    Screening for malignant neoplasm of prostate        Relevant Orders    PSA Screen (Completed)    Screening for lipoid disorders        Relevant Orders    Lipid Panel With LDL/HDL Ratio (Completed)    Morbidly obese (CMS/HCC)        Relevant Orders    TSH Rfx On Abnormal To Free T4 (Completed)        Hypertension: Seems to be controlled with current medication.  His A. fib also seems to have a controlled right and he is anticoagulated.  He does need to get follow back up with cardiology which we discussed today.  I will refill medications.  We discussed medication regimen and signs and symptoms of complications particularly with the Eliquis.    Urinary frequency: In reviewing his chart it does look like he has complained about this in the past.  I will check an A1c.  In reviewing his labs it looks like he had been pretty stable in the prediabetic range.  However he has had a weight gain since last visit so this may have gone up.  I will also check a PSA to screen for prostate cancer.  His family history is not known.  We will also send urine for urinalysis and culture as needed.    We will get a microalbumin while he is here today as well.    We will also check his lipid panel.    We will need follow-up based on labs.    We discussed recent weight gain and diet and exercise strategies particularly following a lower carb diet and eating more fresh and frozen vegetables, eliminating juices, sugary drinks.  Adding more healthy proteins and low fats into his diet to decrease cardiovascular risk with obesity.    His anxiety, depression, insomnia seem to be currently controlled with his medications.  He is followed by psychiatry closely.  He also sees a counselor.    He will need to follow back up for a physical.        Follow Up   No  follow-ups on file.  Patient was given instructions and counseling regarding his condition or for health maintenance advice. Please see specific information pulled into the AVS if appropriate.

## 2021-01-20 LAB
ALBUMIN SERPL-MCNC: 4.6 G/DL (ref 3.8–4.8)
ALBUMIN/CREAT UR: 28 MG/G CREAT (ref 0–29)
ALBUMIN/GLOB SERPL: 2.4 {RATIO} (ref 1.2–2.2)
ALP SERPL-CCNC: 68 IU/L (ref 39–117)
ALT SERPL-CCNC: 36 IU/L (ref 0–44)
APPEARANCE UR: CLEAR
AST SERPL-CCNC: 36 IU/L (ref 0–40)
BACTERIA #/AREA URNS HPF: NORMAL /[HPF]
BASOPHILS # BLD AUTO: 0 X10E3/UL (ref 0–0.2)
BASOPHILS NFR BLD AUTO: 1 %
BILIRUB SERPL-MCNC: 1.3 MG/DL (ref 0–1.2)
BILIRUB UR QL STRIP: NEGATIVE
BUN SERPL-MCNC: 19 MG/DL (ref 8–27)
BUN/CREAT SERPL: 17 (ref 10–24)
CALCIUM SERPL-MCNC: 9.6 MG/DL (ref 8.6–10.2)
CHLORIDE SERPL-SCNC: 99 MMOL/L (ref 96–106)
CHOLEST SERPL-MCNC: 113 MG/DL (ref 100–199)
CO2 SERPL-SCNC: 26 MMOL/L (ref 20–29)
COLOR UR: YELLOW
CREAT SERPL-MCNC: 1.09 MG/DL (ref 0.76–1.27)
CREAT UR-MCNC: 131.4 MG/DL
EOSINOPHIL # BLD AUTO: 0.2 X10E3/UL (ref 0–0.4)
EOSINOPHIL NFR BLD AUTO: 2 %
EPI CELLS #/AREA URNS HPF: NORMAL /HPF (ref 0–10)
ERYTHROCYTE [DISTWIDTH] IN BLOOD BY AUTOMATED COUNT: 13.2 % (ref 11.6–15.4)
GLOBULIN SER CALC-MCNC: 1.9 G/DL (ref 1.5–4.5)
GLUCOSE SERPL-MCNC: 123 MG/DL (ref 65–99)
GLUCOSE UR QL: NEGATIVE
HBA1C MFR BLD: 5.8 % (ref 4.8–5.6)
HCT VFR BLD AUTO: 48.3 % (ref 37.5–51)
HDLC SERPL-MCNC: 36 MG/DL
HGB BLD-MCNC: 15.9 G/DL (ref 13–17.7)
HGB UR QL STRIP: NEGATIVE
IMM GRANULOCYTES # BLD AUTO: 0 X10E3/UL (ref 0–0.1)
IMM GRANULOCYTES NFR BLD AUTO: 0 %
KETONES UR QL STRIP: NEGATIVE
LDLC SERPL CALC-MCNC: 51 MG/DL (ref 0–99)
LDLC/HDLC SERPL: 1.4 RATIO (ref 0–3.6)
LEUKOCYTE ESTERASE UR QL STRIP: NEGATIVE
LYMPHOCYTES # BLD AUTO: 1.6 X10E3/UL (ref 0.7–3.1)
LYMPHOCYTES NFR BLD AUTO: 20 %
MCH RBC QN AUTO: 30.3 PG (ref 26.6–33)
MCHC RBC AUTO-ENTMCNC: 32.9 G/DL (ref 31.5–35.7)
MCV RBC AUTO: 92 FL (ref 79–97)
MICRO URNS: NORMAL
MICRO URNS: NORMAL
MICROALBUMIN UR-MCNC: 36.7 UG/ML
MONOCYTES # BLD AUTO: 0.8 X10E3/UL (ref 0.1–0.9)
MONOCYTES NFR BLD AUTO: 11 %
MUCOUS THREADS URNS QL MICRO: PRESENT
NEUTROPHILS # BLD AUTO: 5.1 X10E3/UL (ref 1.4–7)
NEUTROPHILS NFR BLD AUTO: 66 %
NITRITE UR QL STRIP: NEGATIVE
PH UR STRIP: 6.5 [PH] (ref 5–7.5)
PLATELET # BLD AUTO: 177 X10E3/UL (ref 150–450)
POTASSIUM SERPL-SCNC: 4 MMOL/L (ref 3.5–5.2)
PROT SERPL-MCNC: 6.5 G/DL (ref 6–8.5)
PROT UR QL STRIP: NEGATIVE
PSA SERPL-MCNC: 0.8 NG/ML (ref 0–4)
RBC # BLD AUTO: 5.25 X10E6/UL (ref 4.14–5.8)
RBC #/AREA URNS HPF: NORMAL /HPF (ref 0–2)
SODIUM SERPL-SCNC: 140 MMOL/L (ref 134–144)
SP GR UR: 1.02 (ref 1–1.03)
TRIGL SERPL-MCNC: 153 MG/DL (ref 0–149)
TSH SERPL DL<=0.005 MIU/L-ACNC: 3.78 UIU/ML (ref 0.45–4.5)
URINALYSIS REFLEX: NORMAL
UROBILINOGEN UR STRIP-MCNC: 1 MG/DL (ref 0.2–1)
VLDLC SERPL CALC-MCNC: 26 MG/DL (ref 5–40)
WBC # BLD AUTO: 7.7 X10E3/UL (ref 3.4–10.8)
WBC #/AREA URNS HPF: NORMAL /HPF (ref 0–5)

## 2021-01-22 DIAGNOSIS — R35.0 URINARY FREQUENCY: Primary | ICD-10-CM

## 2021-01-22 RX ORDER — TAMSULOSIN HYDROCHLORIDE 0.4 MG/1
1 CAPSULE ORAL DAILY
Qty: 30 CAPSULE | Refills: 3 | Status: SHIPPED | OUTPATIENT
Start: 2021-01-22 | End: 2021-05-13

## 2021-01-25 RX ORDER — DILTIAZEM HYDROCHLORIDE 60 MG/1
TABLET, FILM COATED ORAL
Qty: 270 TABLET | Refills: 1 | Status: SHIPPED | OUTPATIENT
Start: 2021-01-25 | End: 2021-07-26

## 2021-01-25 NOTE — TELEPHONE ENCOUNTER
Cardizem 60 mg requested  Last office visit 3/18/2021  Last EKG 3/18/2019  Labs 1/19/2021  Next office visit 3/17/2021 with CLEMENTINA

## 2021-03-17 ENCOUNTER — OFFICE VISIT (OUTPATIENT)
Dept: CARDIOLOGY | Facility: CLINIC | Age: 61
End: 2021-03-17

## 2021-03-17 VITALS
HEIGHT: 76 IN | DIASTOLIC BLOOD PRESSURE: 88 MMHG | BODY MASS INDEX: 38.36 KG/M2 | SYSTOLIC BLOOD PRESSURE: 138 MMHG | HEART RATE: 68 BPM | WEIGHT: 315 LBS

## 2021-03-17 DIAGNOSIS — I48.20 ATRIAL FIBRILLATION, CHRONIC (HCC): Primary | ICD-10-CM

## 2021-03-17 DIAGNOSIS — E66.01 CLASS 3 SEVERE OBESITY DUE TO EXCESS CALORIES WITH BODY MASS INDEX (BMI) OF 40.0 TO 44.9 IN ADULT, UNSPECIFIED WHETHER SERIOUS COMORBIDITY PRESENT (HCC): ICD-10-CM

## 2021-03-17 DIAGNOSIS — I10 ESSENTIAL HYPERTENSION: ICD-10-CM

## 2021-03-17 PROCEDURE — 99214 OFFICE O/P EST MOD 30 MIN: CPT | Performed by: INTERNAL MEDICINE

## 2021-03-17 PROCEDURE — 93000 ELECTROCARDIOGRAM COMPLETE: CPT | Performed by: INTERNAL MEDICINE

## 2021-03-17 RX ORDER — METOPROLOL TARTRATE 100 MG/1
TABLET ORAL
Qty: 180 TABLET | Refills: 0 | Status: SHIPPED | OUTPATIENT
Start: 2021-03-17 | End: 2021-06-18

## 2021-03-17 NOTE — PROGRESS NOTES
Date of Office Visit: 2021  Encounter Provider: Perico Valentin MD  Place of Service: [facility] CARDIOLOG[facility]  Patient Name: Ramy Reyez  : 1960    Subjective:     Encounter Date:2021      Patient ID: Ramy Reyez is a 61 y.o. male who has a cc of  Perm AF and morbid obesity.       The patient had a good year.   No anginal chest pain,   No sig matamoros,   No soa,   No fainting,  No orthostasis.   Mild  edema.   Exercise tolerance:  He does a lot of walking at work,     There have been no hospital admission since the last visit.     There have been no bleeding events.       Past Medical History:   Diagnosis Date   • Abnormal liver function test    • Annual physical exam    • Cardiomyopathy (CMS/HCC)    • CPAP (continuous positive airway pressure) dependence    • Depression    • Drug therapy    • Genital warts    • Health care maintenance    • Hypertension    • PAF (paroxysmal atrial fibrillation) (CMS/HCC)    • Recovering alcoholic (CMS/HCC)    • Sleep apnea        Social History     Socioeconomic History   • Marital status:      Spouse name: Not on file   • Number of children: Not on file   • Years of education: Not on file   • Highest education level: Not on file   Tobacco Use   • Smoking status: Never Smoker   • Smokeless tobacco: Never Used   • Tobacco comment: caffeine use   Vaping Use   • Vaping Use: Never used   Substance and Sexual Activity   • Alcohol use: No     Comment: recovery alcoholic       Family History   Problem Relation Age of Onset   • Cancer Mother         uterine    • COPD Father    • Diabetes Father    • Depression Father    • Heart attack Father    • Alcohol abuse Sister    • Thyroid disease Other        Review of Systems   Constitutional: Negative for fever and night sweats.   HENT: Negative for ear pain and stridor.    Eyes: Negative for discharge and visual halos.   Cardiovascular: Negative for cyanosis.   Respiratory: Negative for hemoptysis and  "sputum production.    Hematologic/Lymphatic: Negative for adenopathy.   Skin: Negative for nail changes and unusual hair distribution.   Musculoskeletal: Negative for gout and joint swelling.   Gastrointestinal: Negative for bowel incontinence and flatus.   Genitourinary: Negative for dysuria and flank pain.   Neurological: Negative for seizures and tremors.   Psychiatric/Behavioral: Negative for altered mental status. The patient is not nervous/anxious.             Objective:     Vitals:    03/17/21 0912   BP: 138/88   Pulse: 68   Weight: (!) 155 kg (341 lb)   Height: 193 cm (76\")         Eyes:      General:         Right eye: No discharge.         Left eye: No discharge.   HENT:      Head: Normocephalic and atraumatic.   Neck:      Thyroid: No thyromegaly.      Vascular: No JVD.   Pulmonary:      Effort: Pulmonary effort is normal.      Breath sounds: Normal breath sounds. No rales.   Cardiovascular:      Normal rate. Irregularly irregular rhythm.      No gallop.   Edema:     Peripheral edema absent.   Abdominal:      General: Bowel sounds are normal.      Palpations: Abdomen is soft.      Tenderness: There is no abdominal tenderness.   Musculoskeletal: Normal range of motion.         General: No deformity. Skin:     General: Skin is warm and dry.      Findings: No erythema.   Neurological:      Mental Status: Alert and oriented to person, place, and time.      Motor: Normal muscle tone.   Psychiatric:         Behavior: Behavior normal.         Thought Content: Thought content normal.           ECG 12 Lead    Date/Time: 3/17/2021 9:45 AM  Performed by: Perico Valentin MD  Authorized by: Perico Valentin MD   Comparison: compared with previous ECG   Similar to previous ECG  Rhythm: atrial fibrillation    Clinical impression: abnormal EKG            Lab Review:       Assessment:          Diagnosis Plan   1. Atrial fibrillation, chronic     2. Essential hypertension     3. Class 3 severe obesity due to excess calories " with body mass index (BMI) of 40.0 to 44.9 in adult, unspecified whether serious comorbidity present (CMS/HCC)            Plan:     AF -- he is well rate controlled. On oral AC    He had an echo that showed an EF -- 45%     I thought we would do an echo but he doesn't want to because of costs.     He said the prev echo was done at a time when he was drinking etoh, but he is not now.     Body mass index is 41.51 kg/m².-- we disc weight loss.     HTN -- controlled on meds.

## 2021-03-19 ENCOUNTER — TELEPHONE (OUTPATIENT)
Dept: CARDIOLOGY | Facility: CLINIC | Age: 61
End: 2021-03-19

## 2021-03-19 DIAGNOSIS — I48.20 ATRIAL FIBRILLATION, CHRONIC (HCC): ICD-10-CM

## 2021-03-19 DIAGNOSIS — I10 ESSENTIAL HYPERTENSION: Primary | ICD-10-CM

## 2021-03-26 RX ORDER — LISINOPRIL AND HYDROCHLOROTHIAZIDE 20; 12.5 MG/1; MG/1
TABLET ORAL
Qty: 90 TABLET | Refills: 3 | Status: SHIPPED | OUTPATIENT
Start: 2021-03-26 | End: 2022-03-09

## 2021-04-01 ENCOUNTER — TELEPHONE (OUTPATIENT)
Dept: CARDIOLOGY | Facility: CLINIC | Age: 61
End: 2021-04-01

## 2021-04-01 ENCOUNTER — HOSPITAL ENCOUNTER (OUTPATIENT)
Dept: CARDIOLOGY | Facility: HOSPITAL | Age: 61
Discharge: HOME | End: 2021-04-01
Admitting: NURSE PRACTITIONER

## 2021-04-01 VITALS
BODY MASS INDEX: 38.36 KG/M2 | OXYGEN SATURATION: 99 % | HEART RATE: 80 BPM | SYSTOLIC BLOOD PRESSURE: 130 MMHG | HEIGHT: 76 IN | WEIGHT: 315 LBS | DIASTOLIC BLOOD PRESSURE: 80 MMHG

## 2021-04-01 DIAGNOSIS — I48.20 ATRIAL FIBRILLATION, CHRONIC (HCC): ICD-10-CM

## 2021-04-01 DIAGNOSIS — I10 ESSENTIAL HYPERTENSION: ICD-10-CM

## 2021-04-01 LAB
AORTIC ARCH: 2.4 CM
ASCENDING AORTA: 3.7 CM
BH CV ECHO MEAS - ACS: 2.5 CM
BH CV ECHO MEAS - AO ARCH DIAM (PROXIMAL TRANS.): 2.4 CM
BH CV ECHO MEAS - AO MAX PG (FULL): 0.75 MMHG
BH CV ECHO MEAS - AO MAX PG: 2.5 MMHG
BH CV ECHO MEAS - AO MEAN PG (FULL): 0.58 MMHG
BH CV ECHO MEAS - AO MEAN PG: 1.6 MMHG
BH CV ECHO MEAS - AO ROOT AREA (BSA CORRECTED): 1.5
BH CV ECHO MEAS - AO ROOT AREA: 13.1 CM^2
BH CV ECHO MEAS - AO ROOT DIAM: 4.1 CM
BH CV ECHO MEAS - AO V2 MAX: 79.3 CM/SEC
BH CV ECHO MEAS - AO V2 MEAN: 61.2 CM/SEC
BH CV ECHO MEAS - AO V2 VTI: 17.2 CM
BH CV ECHO MEAS - ASC AORTA: 3.7 CM
BH CV ECHO MEAS - AVA(I,A): 2.9 CM^2
BH CV ECHO MEAS - AVA(I,D): 2.9 CM^2
BH CV ECHO MEAS - AVA(V,A): 3.1 CM^2
BH CV ECHO MEAS - AVA(V,D): 3.1 CM^2
BH CV ECHO MEAS - BSA(HAYCOCK): 2.9 M^2
BH CV ECHO MEAS - BSA: 2.8 M^2
BH CV ECHO MEAS - BZI_BMI: 41.5 KILOGRAMS/M^2
BH CV ECHO MEAS - BZI_METRIC_HEIGHT: 193 CM
BH CV ECHO MEAS - BZI_METRIC_WEIGHT: 154.7 KG
BH CV ECHO MEAS - EDV(MOD-SP2): 303 ML
BH CV ECHO MEAS - EDV(MOD-SP4): 246 ML
BH CV ECHO MEAS - EDV(TEICH): 155.7 ML
BH CV ECHO MEAS - EF(CUBED): 70.9 %
BH CV ECHO MEAS - EF(MOD-BP): 40.8 %
BH CV ECHO MEAS - EF(MOD-SP2): 40.6 %
BH CV ECHO MEAS - EF(MOD-SP4): 39.8 %
BH CV ECHO MEAS - EF(TEICH): 61.9 %
BH CV ECHO MEAS - ESV(MOD-SP2): 180 ML
BH CV ECHO MEAS - ESV(MOD-SP4): 148 ML
BH CV ECHO MEAS - ESV(TEICH): 59.3 ML
BH CV ECHO MEAS - FS: 33.8 %
BH CV ECHO MEAS - IVS/LVPW: 1
BH CV ECHO MEAS - IVSD: 1.7 CM
BH CV ECHO MEAS - LAT PEAK E' VEL: 9.2 CM/SEC
BH CV ECHO MEAS - LV DIASTOLIC VOL/BSA (35-75): 88.5 ML/M^2
BH CV ECHO MEAS - LV MASS(C)D: 477.3 GRAMS
BH CV ECHO MEAS - LV MASS(C)DI: 171.7 GRAMS/M^2
BH CV ECHO MEAS - LV MAX PG: 1.8 MMHG
BH CV ECHO MEAS - LV MEAN PG: 1 MMHG
BH CV ECHO MEAS - LV SYSTOLIC VOL/BSA (12-30): 53.2 ML/M^2
BH CV ECHO MEAS - LV V1 MAX: 66.4 CM/SEC
BH CV ECHO MEAS - LV V1 MEAN: 46.7 CM/SEC
BH CV ECHO MEAS - LV V1 VTI: 13.6 CM
BH CV ECHO MEAS - LVIDD: 5.6 CM
BH CV ECHO MEAS - LVIDS: 3.7 CM
BH CV ECHO MEAS - LVLD AP2: 10.2 CM
BH CV ECHO MEAS - LVLD AP4: 9.8 CM
BH CV ECHO MEAS - LVLS AP2: 8.4 CM
BH CV ECHO MEAS - LVLS AP4: 8.6 CM
BH CV ECHO MEAS - LVOT AREA (M): 3.8 CM^2
BH CV ECHO MEAS - LVOT AREA: 3.7 CM^2
BH CV ECHO MEAS - LVOT DIAM: 2.2 CM
BH CV ECHO MEAS - LVPWD: 1.7 CM
BH CV ECHO MEAS - MED PEAK E' VEL: 10.8 CM/SEC
BH CV ECHO MEAS - MR MAX PG: 58.6 MMHG
BH CV ECHO MEAS - MR MAX VEL: 382.8 CM/SEC
BH CV ECHO MEAS - MV DEC SLOPE: 323.5 CM/SEC^2
BH CV ECHO MEAS - MV DEC TIME: 0.22 SEC
BH CV ECHO MEAS - MV E MAX VEL: 76.3 CM/SEC
BH CV ECHO MEAS - MV MAX PG: 2.1 MMHG
BH CV ECHO MEAS - MV MEAN PG: 0.74 MMHG
BH CV ECHO MEAS - MV P1/2T MAX VEL: 72.4 CM/SEC
BH CV ECHO MEAS - MV P1/2T: 65.5 MSEC
BH CV ECHO MEAS - MV V2 MAX: 72.4 CM/SEC
BH CV ECHO MEAS - MV V2 MEAN: 39.6 CM/SEC
BH CV ECHO MEAS - MV V2 VTI: 24.4 CM
BH CV ECHO MEAS - MVA P1/2T LCG: 3 CM^2
BH CV ECHO MEAS - MVA(P1/2T): 3.4 CM^2
BH CV ECHO MEAS - MVA(VTI): 2 CM^2
BH CV ECHO MEAS - PA ACC TIME: 0.16 SEC
BH CV ECHO MEAS - PA MAX PG (FULL): 0.19 MMHG
BH CV ECHO MEAS - PA MAX PG: 1.2 MMHG
BH CV ECHO MEAS - PA PR(ACCEL): 6.1 MMHG
BH CV ECHO MEAS - PA V2 MAX: 55.8 CM/SEC
BH CV ECHO MEAS - PI END-D VEL: 76 CM/SEC
BH CV ECHO MEAS - PVA(V,A): 7.3 CM^2
BH CV ECHO MEAS - PVA(V,D): 7.3 CM^2
BH CV ECHO MEAS - QP/QS: 1.9
BH CV ECHO MEAS - RAP SYSTOLE: 15 MMHG
BH CV ECHO MEAS - RV MAX PG: 1.1 MMHG
BH CV ECHO MEAS - RV MEAN PG: 0.58 MMHG
BH CV ECHO MEAS - RV V1 MAX: 51.4 CM/SEC
BH CV ECHO MEAS - RV V1 MEAN: 35.7 CM/SEC
BH CV ECHO MEAS - RV V1 VTI: 12.2 CM
BH CV ECHO MEAS - RVOT AREA: 8 CM^2
BH CV ECHO MEAS - RVOT DIAM: 3.2 CM
BH CV ECHO MEAS - RVSP: 38 MMHG
BH CV ECHO MEAS - SI(AO): 81.1 ML/M^2
BH CV ECHO MEAS - SI(CUBED): 45.6 ML/M^2
BH CV ECHO MEAS - SI(LVOT): 17.9 ML/M^2
BH CV ECHO MEAS - SI(MOD-SP2): 44.3 ML/M^2
BH CV ECHO MEAS - SI(MOD-SP4): 35.3 ML/M^2
BH CV ECHO MEAS - SI(TEICH): 34.7 ML/M^2
BH CV ECHO MEAS - SUP REN AO DIAM: 2.8 CM
BH CV ECHO MEAS - SV(AO): 225.3 ML
BH CV ECHO MEAS - SV(CUBED): 126.7 ML
BH CV ECHO MEAS - SV(LVOT): 49.9 ML
BH CV ECHO MEAS - SV(MOD-SP2): 123 ML
BH CV ECHO MEAS - SV(MOD-SP4): 98 ML
BH CV ECHO MEAS - SV(RVOT): 97.2 ML
BH CV ECHO MEAS - SV(TEICH): 96.4 ML
BH CV ECHO MEAS - TAPSE (>1.6): 1.6 CM
BH CV ECHO MEAS - TR MAX VEL: 241.1 CM/SEC
BH CV ECHO MEASUREMENTS AVERAGE E/E' RATIO: 7.63
BH CV XLRA - RV BASE: 5 CM
BH CV XLRA - RV LENGTH: 6.8 CM
BH CV XLRA - RV MID: 3.7 CM
BH CV XLRA - TDI S': 6.9 CM/SEC
LEFT ATRIUM VOLUME INDEX: 45 ML/M2
SINUS: 4 CM
STJ: 3.6 CM

## 2021-04-01 PROCEDURE — 93306 TTE W/DOPPLER COMPLETE: CPT

## 2021-04-01 PROCEDURE — 25010000002 PERFLUTREN (DEFINITY) 8.476 MG IN SODIUM CHLORIDE (PF) 0.9 % 10 ML INJECTION: Performed by: NURSE PRACTITIONER

## 2021-04-01 PROCEDURE — 93306 TTE W/DOPPLER COMPLETE: CPT | Performed by: INTERNAL MEDICINE

## 2021-04-01 RX ADMIN — PERFLUTREN 1.5 ML: 6.52 INJECTION, SUSPENSION INTRAVENOUS at 08:16

## 2021-04-01 NOTE — TELEPHONE ENCOUNTER
----- Message from Perico Valentin MD sent at 4/1/2021 12:20 PM EDT -----  Mlevi, you can let him know that his echocardiogram is pretty much unchanged.

## 2021-04-26 RX ORDER — RIVAROXABAN 20 MG/1
TABLET, FILM COATED ORAL
Qty: 90 TABLET | Refills: 3 | Status: SHIPPED | OUTPATIENT
Start: 2021-04-26 | End: 2022-05-02

## 2021-05-12 DIAGNOSIS — R39.11 URINARY HESITANCY: ICD-10-CM

## 2021-05-12 DIAGNOSIS — R35.0 URINARY FREQUENCY: Primary | ICD-10-CM

## 2021-05-12 DIAGNOSIS — R35.0 URINARY FREQUENCY: ICD-10-CM

## 2021-05-13 RX ORDER — TAMSULOSIN HYDROCHLORIDE 0.4 MG/1
1 CAPSULE ORAL DAILY
Qty: 30 CAPSULE | Refills: 3 | Status: SHIPPED | OUTPATIENT
Start: 2021-05-13 | End: 2021-09-13

## 2021-06-18 RX ORDER — METOPROLOL TARTRATE 100 MG/1
TABLET ORAL
Qty: 180 TABLET | Refills: 3 | Status: SHIPPED | OUTPATIENT
Start: 2021-06-18 | End: 2022-06-20

## 2021-07-01 ENCOUNTER — TELEPHONE (OUTPATIENT)
Dept: FAMILY MEDICINE CLINIC | Facility: CLINIC | Age: 61
End: 2021-07-01

## 2021-07-01 NOTE — TELEPHONE ENCOUNTER
PATIENT CALLED AND STATED HE IS HAVING LOWER BACK PAIN. PATIENT STATES HE IS GOING TO A CHIROPRACTOR  AND ITS NOT HELPING. PATIENT WOULD LIKE TO KNOW IF HE NEEDS TO BE SEEN OR IF A REFERRAL CAN BE PLACED FOR ANOTHER TREATMENT OPTION       PLEASE ADVISE     [phone]

## 2021-07-06 ENCOUNTER — OFFICE VISIT (OUTPATIENT)
Dept: FAMILY MEDICINE CLINIC | Facility: CLINIC | Age: 61
End: 2021-07-06

## 2021-07-06 VITALS
SYSTOLIC BLOOD PRESSURE: 147 MMHG | BODY MASS INDEX: 38.36 KG/M2 | HEART RATE: 78 BPM | HEIGHT: 76 IN | DIASTOLIC BLOOD PRESSURE: 92 MMHG | TEMPERATURE: 97.1 F | WEIGHT: 315 LBS | OXYGEN SATURATION: 99 %

## 2021-07-06 DIAGNOSIS — M54.50 ACUTE RIGHT-SIDED LOW BACK PAIN WITHOUT SCIATICA: Primary | ICD-10-CM

## 2021-07-06 PROCEDURE — 99213 OFFICE O/P EST LOW 20 MIN: CPT | Performed by: NURSE PRACTITIONER

## 2021-07-06 RX ORDER — CYCLOBENZAPRINE HCL 10 MG
10 TABLET ORAL NIGHTLY PRN
Qty: 10 TABLET | Refills: 0 | Status: SHIPPED | OUTPATIENT
Start: 2021-07-06 | End: 2021-07-19 | Stop reason: SDUPTHER

## 2021-07-06 RX ORDER — VIBEGRON 75 MG/1
75 TABLET, FILM COATED ORAL
COMMUNITY

## 2021-07-06 RX ORDER — METAXALONE 800 MG/1
800 TABLET ORAL 3 TIMES DAILY PRN
Qty: 90 TABLET | Refills: 0 | Status: SHIPPED | OUTPATIENT
Start: 2021-07-06 | End: 2021-08-17

## 2021-07-06 NOTE — PROGRESS NOTES
"Chief Complaint  Back Pain (c/o low back pain x 2wks)    Subjective          Ramy Reyez presents to [facility]  History of Present Illness Pt is here for couple weeks of back pain-MRI ordered by chiropractor.  Pt wants to make sure that he is on right course of tx.  No injuries, just had sudden onset of pain.  He is improving some with chiropractic, however pain is not resolved.  Causing him trouble sleeping at night despite trazodone.  Uses ice at HS. OTC pain medication helpful minimally and only for short time.  Pain is worse on the right side than on the left and across most of his low back.  He denies any radiculopathy.  He denies changes to bowel or bladder, saddle anesthesia.  He has no fever or chills.    Objective   Vital Signs:   /92   Pulse 78   Temp 97.1 °F (36.2 °C)   Ht 193 cm (76\")   Wt (!) 159 kg (350 lb 4.8 oz)   SpO2 99%   BMI 42.64 kg/m²     Physical Exam  Vitals and nursing note reviewed.   Constitutional:       General: He is not in acute distress.     Appearance: He is well-developed. He is not ill-appearing or diaphoretic.   HENT:      Head: Normocephalic and atraumatic.   Eyes:      General:         Right eye: No discharge.         Left eye: No discharge.      Conjunctiva/sclera: Conjunctivae normal.   Cardiovascular:      Rate and Rhythm: Normal rate and regular rhythm.   Pulmonary:      Effort: Pulmonary effort is normal.      Breath sounds: Normal breath sounds.   Abdominal:      General: Bowel sounds are normal.      Palpations: Abdomen is soft.      Tenderness: There is no abdominal tenderness.   Musculoskeletal:         General: No deformity.      Cervical back: Neck supple.      Lumbar back: Spasms and tenderness present. No deformity or bony tenderness. Decreased range of motion. Negative right straight leg raise test and negative left straight leg raise test.      Comments: Gait smooth and steady   Lymphadenopathy:      Cervical: No " cervical adenopathy.   Skin:     General: Skin is warm and dry.   Neurological:      General: No focal deficit present.      Mental Status: He is alert and oriented to person, place, and time.   Psychiatric:         Mood and Affect: Mood normal.         Behavior: Behavior normal.        Result Review :                 Assessment and Plan    Diagnoses and all orders for this visit:    1. Acute right-sided low back pain without sciatica (Primary)  -     metaxalone (Skelaxin) 800 MG tablet; Take 1 tablet by mouth 3 (Three) Times a Day As Needed for Muscle Spasms.  Dispense: 90 tablet; Refill: 0  -     cyclobenzaprine (FLEXERIL) 10 MG tablet; Take 1 tablet by mouth At Night As Needed for Muscle Spasms. Do not take with ativan or alcohol  Dispense: 10 tablet; Refill: 0    Low back pain: We will give muscle relaxer.  Recommend the Flexeril only at night as needed.  I have cautioned him in no uncertain terms that he cannot take this with Ativan due to additive somnolence especially since he is already on trazodone.  We will also try Skelaxin to use during the day.  If insurance does not cover it he can try good Rx to see if more affordable.  He has no red flags.  We discussed signs and symptoms that require emergent evaluation.  He can continue with chiropractic as needed.  He does seem to be getting some improvement from it.        Follow Up   Return if symptoms worsen or fail to improve.  Patient was given instructions and counseling regarding his condition or for health maintenance advice. Please see specific information pulled into the AVS if appropriate.

## 2021-07-19 ENCOUNTER — OFFICE VISIT (OUTPATIENT)
Dept: FAMILY MEDICINE CLINIC | Facility: CLINIC | Age: 61
End: 2021-07-19

## 2021-07-19 VITALS
WEIGHT: 315 LBS | DIASTOLIC BLOOD PRESSURE: 84 MMHG | TEMPERATURE: 96.8 F | HEART RATE: 89 BPM | HEIGHT: 76 IN | OXYGEN SATURATION: 98 % | BODY MASS INDEX: 38.36 KG/M2 | SYSTOLIC BLOOD PRESSURE: 122 MMHG

## 2021-07-19 DIAGNOSIS — R73.03 PREDIABETES: ICD-10-CM

## 2021-07-19 DIAGNOSIS — M54.50 ACUTE RIGHT-SIDED LOW BACK PAIN WITHOUT SCIATICA: ICD-10-CM

## 2021-07-19 DIAGNOSIS — Z23 VACCINE FOR DIPHTHERIA-TETANUS-PERTUSSIS, COMBINED: ICD-10-CM

## 2021-07-19 DIAGNOSIS — I10 ESSENTIAL HYPERTENSION: Primary | ICD-10-CM

## 2021-07-19 PROCEDURE — 99214 OFFICE O/P EST MOD 30 MIN: CPT | Performed by: NURSE PRACTITIONER

## 2021-07-19 PROCEDURE — 90715 TDAP VACCINE 7 YRS/> IM: CPT | Performed by: NURSE PRACTITIONER

## 2021-07-19 PROCEDURE — 90471 IMMUNIZATION ADMIN: CPT | Performed by: NURSE PRACTITIONER

## 2021-07-19 RX ORDER — CYCLOBENZAPRINE HCL 10 MG
10 TABLET ORAL NIGHTLY PRN
Qty: 10 TABLET | Refills: 0 | Status: SHIPPED | OUTPATIENT
Start: 2021-07-19 | End: 2021-08-17

## 2021-07-19 NOTE — PROGRESS NOTES
"Chief Complaint  Hypertension (f/u htn )    Subjective          Ramy Reyez presents to [facility]  History of Present Illness here for 6-month follow-up on hypertension. He does not check his blood pressure at home. He denies any side effects of his medications. He takes them all as directed. Denies chest pain, palpitations, dyspnea, cough.    He follows with cardiology as directed for chronic A. fib. Not noticed any recent rate problems. No dizziness, headaches, leg swelling. Taking Xarelto without any side effects. He has no abdominal pain, melena, nausea, vomiting, diarrhea, hematuria. No noted bruising.    Back pain is improved with the Flexeril. He is requesting an additional prescription to have on hand as needed since he tends to get exacerbations of his chronic back pain. He denies changes to bowel or bladder, saddle anesthesia. He is not having any radicular symptoms.    He is aware that he has prediabetes. He follows no particular exercise or dietary modifications. He denies signs or symptoms of hypo or hyperglycemia.    His chiropractor has ordered an MRI and he will request a copy for his chart.    Objective   Vital Signs:   /84   Pulse 89   Temp 96.8 °F (36 °C)   Ht 193 cm (76\")   Wt (!) 157 kg (346 lb 11.2 oz)   SpO2 98%   BMI 42.20 kg/m²     Physical Exam  Vitals and nursing note reviewed.   Constitutional:       General: He is not in acute distress.     Appearance: He is well-developed. He is obese. He is not ill-appearing or diaphoretic.   HENT:      Head: Normocephalic and atraumatic.   Eyes:      General:         Right eye: No discharge.         Left eye: No discharge.      Conjunctiva/sclera: Conjunctivae normal.   Cardiovascular:      Rate and Rhythm: Normal rate. Rhythm irregularly irregular.   Pulmonary:      Effort: Pulmonary effort is normal.      Breath sounds: Normal breath sounds.   Abdominal:      General: Bowel sounds are normal.      " Palpations: Abdomen is soft.      Tenderness: There is no abdominal tenderness.   Musculoskeletal:         General: No deformity.      Right lower leg: No edema.      Left lower leg: No edema.      Comments: Gait smooth and steady   Skin:     General: Skin is warm and dry.   Neurological:      General: No focal deficit present.      Mental Status: He is alert and oriented to person, place, and time.   Psychiatric:         Mood and Affect: Mood normal.         Behavior: Behavior normal.        Result Review :                 Assessment and Plan    Diagnoses and all orders for this visit:    1. Essential hypertension (Primary)  -     Basic Metabolic Panel    2. Prediabetes  -     Hemoglobin A1c    3. Acute right-sided low back pain without sciatica  -     cyclobenzaprine (FLEXERIL) 10 MG tablet; Take 1 tablet by mouth At Night As Needed for Muscle Spasms. Do not take with ativan or alcohol  Dispense: 10 tablet; Refill: 0    4. Vaccine for diphtheria-tetanus-pertussis, combined  -     Tdap Vaccine Greater Than or Equal To 6yo IM      BP is at goal upon recheck. We will continue current medication. We will get a BMP today.    He has not had an A1c in quite a while. We will get an A1c today to monitor his prediabetes. If stable then he will need 1 every 6 months. If elevated will need a follow-up to discuss plan to monitor and control.    Back pain has improved. Patient is requesting a prescription refill for Flexeril. I will give him a few to use as needed. Continue with chiropractor as needed. I have requested patient let me know when MRI is done and I have requested an SACHA to get copy.    We will give him Tdap today.          Follow Up   Return in about 6 months (around 1/19/2022).  Patient was given instructions and counseling regarding his condition or for health maintenance advice. Please see specific information pulled into the AVS if appropriate.

## 2021-07-20 LAB
BUN SERPL-MCNC: 16 MG/DL (ref 8–23)
BUN/CREAT SERPL: 16.5 (ref 7–25)
CALCIUM SERPL-MCNC: 9.9 MG/DL (ref 8.6–10.5)
CHLORIDE SERPL-SCNC: 100 MMOL/L (ref 98–107)
CO2 SERPL-SCNC: 28.1 MMOL/L (ref 22–29)
CREAT SERPL-MCNC: 0.97 MG/DL (ref 0.76–1.27)
GLUCOSE SERPL-MCNC: 110 MG/DL (ref 65–99)
HBA1C MFR BLD: 5.6 % (ref 4.8–5.6)
POTASSIUM SERPL-SCNC: 4 MMOL/L (ref 3.5–5.2)
SODIUM SERPL-SCNC: 142 MMOL/L (ref 136–145)

## 2021-07-26 RX ORDER — DILTIAZEM HYDROCHLORIDE 60 MG/1
TABLET, FILM COATED ORAL
Qty: 270 TABLET | Refills: 3 | Status: SHIPPED | OUTPATIENT
Start: 2021-07-26 | End: 2022-08-01

## 2021-08-04 ENCOUNTER — TELEPHONE (OUTPATIENT)
Dept: FAMILY MEDICINE CLINIC | Facility: CLINIC | Age: 61
End: 2021-08-04

## 2021-08-04 NOTE — TELEPHONE ENCOUNTER
PATIENT CALLING WANTING TO LET THERESE CONCEPCION KNOW HE HAS HIS CHIROPRACTOR RESULTS. HE WANTED TO KNOW IF SHE RECEIVED THOSE OR IF HE NEEDS TO BRING IN THE DISC, HE HAS 3 BULGING DISCS.      PLEASE ADVISE  [phone]

## 2021-08-15 DIAGNOSIS — M54.50 ACUTE RIGHT-SIDED LOW BACK PAIN WITHOUT SCIATICA: ICD-10-CM

## 2021-08-17 RX ORDER — METAXALONE 800 MG/1
TABLET ORAL
Qty: 90 TABLET | Refills: 0 | Status: SHIPPED | OUTPATIENT
Start: 2021-08-17 | End: 2022-01-26

## 2021-08-17 RX ORDER — CYCLOBENZAPRINE HCL 10 MG
10 TABLET ORAL NIGHTLY PRN
Qty: 10 TABLET | Refills: 0 | Status: SHIPPED | OUTPATIENT
Start: 2021-08-17 | End: 2022-01-26 | Stop reason: SDUPTHER

## 2021-09-11 DIAGNOSIS — R35.0 URINARY FREQUENCY: ICD-10-CM

## 2021-09-13 RX ORDER — TAMSULOSIN HYDROCHLORIDE 0.4 MG/1
1 CAPSULE ORAL DAILY
Qty: 90 CAPSULE | Refills: 1 | Status: SHIPPED | OUTPATIENT
Start: 2021-09-13 | End: 2022-03-09

## 2021-12-07 ENCOUNTER — OFFICE VISIT (OUTPATIENT)
Dept: FAMILY MEDICINE CLINIC | Facility: CLINIC | Age: 61
End: 2021-12-07

## 2021-12-07 VITALS
HEIGHT: 76 IN | SYSTOLIC BLOOD PRESSURE: 123 MMHG | DIASTOLIC BLOOD PRESSURE: 82 MMHG | TEMPERATURE: 96.4 F | BODY MASS INDEX: 38.36 KG/M2 | OXYGEN SATURATION: 97 % | WEIGHT: 315 LBS | HEART RATE: 76 BPM

## 2021-12-07 DIAGNOSIS — R21 RASH AND NONSPECIFIC SKIN ERUPTION: ICD-10-CM

## 2021-12-07 DIAGNOSIS — K64.9 HEMORRHOIDS, UNSPECIFIED HEMORRHOID TYPE: ICD-10-CM

## 2021-12-07 DIAGNOSIS — I87.2 VENOUS STASIS DERMATITIS OF BOTH LOWER EXTREMITIES: Primary | ICD-10-CM

## 2021-12-07 PROCEDURE — 99214 OFFICE O/P EST MOD 30 MIN: CPT | Performed by: NURSE PRACTITIONER

## 2021-12-07 RX ORDER — CETIRIZINE HYDROCHLORIDE 10 MG/1
10 TABLET ORAL DAILY
Qty: 90 TABLET | Refills: 1 | Status: SHIPPED | OUTPATIENT
Start: 2021-12-07 | End: 2022-12-16

## 2021-12-07 RX ORDER — CLOTRIMAZOLE AND BETAMETHASONE DIPROPIONATE 10; .64 MG/G; MG/G
1 CREAM TOPICAL 2 TIMES DAILY
Qty: 45 G | Refills: 0 | Status: SHIPPED | OUTPATIENT
Start: 2021-12-07 | End: 2022-12-09

## 2021-12-07 NOTE — PROGRESS NOTES
"Chief Complaint  Rash (c/o rash all over, started in R leg and continues spreading, itches, x 1month getting worse)    Subjective          Ramy Reyez presents to [facility]  History of Present Illness patient is here for rash.  About 1 month ago developed what he thought was cellulitis-RLE itching, swelling, redness.  He has had several episodes of cellulitis in his right leg in the past.  Went to urgent care and was started on Keflex which improved cellulitis.    After he completed abx he had same sx on both legs and told it was venous stasis and since then rash and itching has spread to posterior arms and lower abd and scrotum.  It is very itchy.  Tends to get red and more rash she was scratching.  Denies any new medications.  Did get Covid booster just prior to onset.    Has a history of external hemorrhoids which have recently been bleeding more.  He has not been able to apply the Anusol into his rectum.  Not used it topically.  Notices some bright red blood in toilet bowl after bowel movements.  Usually has a bowel movement each morning.  Hemorrhoids have been a chronic ongoing problem for him.  Declines colorectal specialist.      Objective   Vital Signs:   /82   Pulse 76   Temp 96.4 °F (35.8 °C)   Ht 193 cm (76\")   Wt (!) 164 kg (360 lb 11.2 oz)   SpO2 97%   BMI 43.91 kg/m²     Physical Exam  Vitals and nursing note reviewed.   Constitutional:       General: He is not in acute distress.     Appearance: He is well-developed. He is obese. He is not ill-appearing or diaphoretic.   HENT:      Head: Normocephalic and atraumatic.   Eyes:      General:         Right eye: No discharge.         Left eye: No discharge.      Conjunctiva/sclera: Conjunctivae normal.   Cardiovascular:      Rate and Rhythm: Normal rate and regular rhythm.      Heart sounds: Normal heart sounds.   Pulmonary:      Effort: Pulmonary effort is normal.      Breath sounds: Normal breath sounds. "   Abdominal:      General: Bowel sounds are normal.      Palpations: Abdomen is soft.      Tenderness: There is no abdominal tenderness.   Musculoskeletal:         General: No deformity.      Right lower leg: Edema present.      Left lower leg: Edema present.      Comments: B/l LE with appearance of venous stasis-no evidence of cellulitis     Skin:     General: Skin is warm and dry.      Comments: B/l groin with mild intertrigo appearance  No rash on upper arms-appear dry and a little excoriated   Neurological:      General: No focal deficit present.      Mental Status: He is alert and oriented to person, place, and time.        Result Review :                 Assessment and Plan    Diagnoses and all orders for this visit:    1. Venous stasis dermatitis of both lower extremities (Primary)  -     cetirizine (zyrTEC) 10 MG tablet; Take 1 tablet by mouth Daily.  Dispense: 90 tablet; Refill: 1    2. Rash and nonspecific skin eruption  -     clotrimazole-betamethasone (Lotrisone) 1-0.05 % cream; Apply 1 application topically to the appropriate area as directed 2 (Two) Times a Day.  Dispense: 45 g; Refill: 0    3. Hemorrhoids, unspecified hemorrhoid type      Discussed mgmt venous stasis-he normally uses compression stockings to manage swelling.  Recommend Aquaphor at at bedtime for itching.  If not resolving will let me know and we can try alternative.  He is aware of signs and symptoms of complications.    Rash: I suspect at least in his groin it is fungal and will give topical.  If not improving or certainly worsening will need to let me know.  Cetirizine for itching.  His skin is also very dry and recommend moisture especially with the cooler weather now.    Hemorrhoids: May be bleeding more due to anticoagulation.  Discussed use of topicals as well as management of chronic hemorrhoids.  He declines referral to colorectal until after the first year.  Will let me know if worsening bleeding or any  pain.              Follow Up   Return if symptoms worsen or fail to improve.  Patient was given instructions and counseling regarding his condition or for health maintenance advice. Please see specific information pulled into the AVS if appropriate.

## 2022-01-26 ENCOUNTER — TELEPHONE (OUTPATIENT)
Dept: CARDIOLOGY | Facility: CLINIC | Age: 62
End: 2022-01-26

## 2022-01-26 ENCOUNTER — OFFICE VISIT (OUTPATIENT)
Dept: FAMILY MEDICINE CLINIC | Facility: CLINIC | Age: 62
End: 2022-01-26

## 2022-01-26 VITALS
HEIGHT: 76 IN | HEART RATE: 80 BPM | SYSTOLIC BLOOD PRESSURE: 114 MMHG | DIASTOLIC BLOOD PRESSURE: 83 MMHG | WEIGHT: 315 LBS | TEMPERATURE: 95.3 F | OXYGEN SATURATION: 98 % | BODY MASS INDEX: 38.36 KG/M2

## 2022-01-26 DIAGNOSIS — M54.50 ACUTE RIGHT-SIDED LOW BACK PAIN WITHOUT SCIATICA: ICD-10-CM

## 2022-01-26 DIAGNOSIS — Z00.00 ROUTINE GENERAL MEDICAL EXAMINATION AT A HEALTH CARE FACILITY: Primary | ICD-10-CM

## 2022-01-26 DIAGNOSIS — I48.20 ATRIAL FIBRILLATION, CHRONIC: ICD-10-CM

## 2022-01-26 DIAGNOSIS — I10 ESSENTIAL HYPERTENSION: ICD-10-CM

## 2022-01-26 DIAGNOSIS — R73.03 PREDIABETES: ICD-10-CM

## 2022-01-26 DIAGNOSIS — Z79.01 ANTICOAGULATED: ICD-10-CM

## 2022-01-26 DIAGNOSIS — Z12.5 SCREENING FOR PROSTATE CANCER: ICD-10-CM

## 2022-01-26 PROCEDURE — 99214 OFFICE O/P EST MOD 30 MIN: CPT | Performed by: NURSE PRACTITIONER

## 2022-01-26 PROCEDURE — 99396 PREV VISIT EST AGE 40-64: CPT | Performed by: NURSE PRACTITIONER

## 2022-01-26 RX ORDER — CYCLOBENZAPRINE HCL 10 MG
10 TABLET ORAL NIGHTLY PRN
Qty: 30 TABLET | Refills: 0 | Status: SHIPPED | OUTPATIENT
Start: 2022-01-26 | End: 2022-02-25

## 2022-01-26 NOTE — PROGRESS NOTES
"Chief Complaint  Hypertension (6 month /fu )    Subjective          Ramy Reyez presents to [facility]  History of Present Illness pt is here for physical. Has been doing pretty well since last visit.  No new c/o other than tremor in right hand more noticeable recently.  Never at rest-only noted when he is eating, writing.  No tremors anywhere else.  No weakness associated.  He thinks his sister may also have tremors but she also is on multiple psych meds as is he.  He does not remember if tremors more noticeable with any medication changes.  No family history of Parkinson's disease.  Seems to be the worst around dinnertime.    Hypertension has been managed well on current medications.  Does not check his blood pressure at home.  Denies side effects of medications, dizziness, headaches, chest pain, dyspnea, cough.    A. fib has been controlled well.  No RVR noticed.  He is anticoagulated without excessive bruising.  No melena or hematuria.    Prediabetes: Not following any particular diet.  Trying to get more exercise in which he struggles with.  Denies signs or symptoms of hyper or hyper glycemia.    Has intermittent exacerbations of right sciatica for which he takes an occasional Flexeril.  He is requesting a refill.  He is not currently symptomatic.  He has not had any changes in his symptoms when he is symptomatic.  He has no bowel or bladder changes and no saddle anesthesia when he is symptomatic or otherwise.    Chronic venous stasis ulcers about the same.  Using compression stockings daily.    Health goal to lose weight.       Objective   Vital Signs:   /83   Pulse 80   Temp 95.3 °F (35.2 °C)   Ht 193 cm (76\")   Wt (!) 164 kg (361 lb 6.4 oz)   SpO2 98%   BMI 43.99 kg/m²     Physical Exam  Vitals and nursing note reviewed.   Constitutional:       General: He is not in acute distress.     Appearance: He is well-developed. He is not ill-appearing.   HENT:      Head: " Normocephalic and atraumatic.      Right Ear: Tympanic membrane, ear canal and external ear normal.      Left Ear: Tympanic membrane, ear canal and external ear normal.      Mouth/Throat:      Mouth: Mucous membranes are moist.      Pharynx: Uvula midline. No posterior oropharyngeal erythema.   Eyes:      General: No scleral icterus.        Right eye: No discharge.         Left eye: No discharge.      Conjunctiva/sclera: Conjunctivae normal.      Pupils: Pupils are equal, round, and reactive to light.   Neck:      Thyroid: No thyromegaly.   Cardiovascular:      Rate and Rhythm: Normal rate and regular rhythm.      Heart sounds: Normal heart sounds. No murmur heard.      Pulmonary:      Effort: Pulmonary effort is normal.      Breath sounds: Normal breath sounds.   Abdominal:      General: Bowel sounds are normal.      Palpations: Abdomen is soft.      Tenderness: There is no abdominal tenderness.   Musculoskeletal:         General: No deformity.      Cervical back: Neck supple.      Comments: Gait smooth and steady   Lymphadenopathy:      Cervical: No cervical adenopathy.   Skin:     General: Skin is warm and dry.   Neurological:      General: No focal deficit present.      Mental Status: He is alert and oriented to person, place, and time.      Comments: Very faint tremor of right hand with intention.  No tremors with rest in bilateral arms or head.   Psychiatric:         Attention and Perception: Attention and perception normal.         Mood and Affect: Mood and affect normal.         Speech: Speech normal.         Behavior: Behavior normal. Behavior is cooperative.         Thought Content: Thought content normal.         Cognition and Memory: Cognition and memory normal.      Comments: Very pleasant, conversant, good medical historian        Result Review :                 Assessment and Plan    Diagnoses and all orders for this visit:    1. Routine general medical examination at a health care facility  (Primary)  -     CBC & Differential  -     Comprehensive Metabolic Panel  -     Lipid Panel With LDL / HDL Ratio    2. Essential hypertension  -     Microalbumin / Creatinine Urine Ratio - Urine, Clean Catch    3. Atrial fibrillation, chronic    4. Anticoagulated  -     CBC & Differential    5. Prediabetes  -     Hemoglobin A1c    6. Acute right-sided low back pain without sciatica  -     cyclobenzaprine (FLEXERIL) 10 MG tablet; Take 1 tablet by mouth At Night As Needed for Muscle Spasms. Do not take with ativan or alcohol  Dispense: 30 tablet; Refill: 0    7. Screening for prostate cancer  -     PSA DIAGNOSTIC ONLY       As part of wellness and prevention, the following topics were discussed with the patient:   Nutrition-diet high in fresh/fozen veges, lower in carbs, unsaturated fats, and higher in lean proteins, adequate hydration   Physical activity/regular exercise-150 mins per week of moderate activity that increases HR and is enjoyable to lower stress and improve CVD     Healthy weight-above goal BMI-discussed small steps to try toward goal    Injury prevention-covid safety, back and joint health    Substance misuse/abuse-none identified   Dental health-recommend twice per year dental cleans and daily flossing to lower inflammation in body   Mental health-stress mgmt and sleep hygiene-followed by psychiatry-seems very stable on current meds   Immunization-UTD  UTD on c scope-3 yr recall  psa ordered today.  BPH is currently well controlled.    Hypertension: Very well controlled we will continue current medications.    Essential tremors: Likely exacerbated with psych meds.  Discussed taking evening beta-blocker around dinnertime rather than waiting till bedtime to see if that helps.  It is likely controlling his daytime tremors.  He is on a twice a day beta-blocker for rate control for A. Fib.  If this is not helpful will let me know.    Anticoagulated.  Will check CBC today.    Prediabetes: Has been stable.   Discussed diet and exercise.  We will get A1c today and make any changes based on A1c.  Not currently medicated.    Intermittent low back pain with right-sided sciatica: No red flags.  Not currently symptomatic.  We will give Flexeril to have on hand as needed.  Side effects discussed.            Follow Up   Return in about 6 months (around 7/26/2022).  Patient was given instructions and counseling regarding his condition or for health maintenance advice. Please see specific information pulled into the AVS if appropriate.

## 2022-01-27 LAB
ALBUMIN SERPL-MCNC: 4.5 G/DL (ref 3.8–4.8)
ALBUMIN/CREAT UR: 32 MG/G CREAT (ref 0–29)
ALBUMIN/GLOB SERPL: 2 {RATIO} (ref 1.2–2.2)
ALP SERPL-CCNC: 76 IU/L (ref 44–121)
ALT SERPL-CCNC: 36 IU/L (ref 0–44)
AST SERPL-CCNC: 33 IU/L (ref 0–40)
BASOPHILS # BLD AUTO: 0.1 X10E3/UL (ref 0–0.2)
BASOPHILS NFR BLD AUTO: 1 %
BILIRUB SERPL-MCNC: 0.7 MG/DL (ref 0–1.2)
BUN SERPL-MCNC: 15 MG/DL (ref 8–27)
BUN/CREAT SERPL: 12 (ref 10–24)
CALCIUM SERPL-MCNC: 10.1 MG/DL (ref 8.6–10.2)
CHLORIDE SERPL-SCNC: 100 MMOL/L (ref 96–106)
CHOLEST SERPL-MCNC: 134 MG/DL (ref 100–199)
CO2 SERPL-SCNC: 26 MMOL/L (ref 20–29)
CREAT SERPL-MCNC: 1.25 MG/DL (ref 0.76–1.27)
CREAT UR-MCNC: 100.5 MG/DL
EOSINOPHIL # BLD AUTO: 0.2 X10E3/UL (ref 0–0.4)
EOSINOPHIL NFR BLD AUTO: 2 %
ERYTHROCYTE [DISTWIDTH] IN BLOOD BY AUTOMATED COUNT: 12.9 % (ref 11.6–15.4)
GLOBULIN SER CALC-MCNC: 2.2 G/DL (ref 1.5–4.5)
GLUCOSE SERPL-MCNC: 148 MG/DL (ref 65–99)
HBA1C MFR BLD: 6.8 % (ref 4.8–5.6)
HCT VFR BLD AUTO: 44.8 % (ref 37.5–51)
HDLC SERPL-MCNC: 34 MG/DL
HGB BLD-MCNC: 14.6 G/DL (ref 13–17.7)
IMM GRANULOCYTES # BLD AUTO: 0 X10E3/UL (ref 0–0.1)
IMM GRANULOCYTES NFR BLD AUTO: 1 %
LDLC SERPL CALC-MCNC: 80 MG/DL (ref 0–99)
LDLC/HDLC SERPL: 2.4 RATIO (ref 0–3.6)
LYMPHOCYTES # BLD AUTO: 1.5 X10E3/UL (ref 0.7–3.1)
LYMPHOCYTES NFR BLD AUTO: 20 %
MCH RBC QN AUTO: 28.3 PG (ref 26.6–33)
MCHC RBC AUTO-ENTMCNC: 32.6 G/DL (ref 31.5–35.7)
MCV RBC AUTO: 87 FL (ref 79–97)
MICROALBUMIN UR-MCNC: 31.9 UG/ML
MONOCYTES # BLD AUTO: 0.8 X10E3/UL (ref 0.1–0.9)
MONOCYTES NFR BLD AUTO: 10 %
NEUTROPHILS # BLD AUTO: 5.1 X10E3/UL (ref 1.4–7)
NEUTROPHILS NFR BLD AUTO: 66 %
PLATELET # BLD AUTO: 209 X10E3/UL (ref 150–450)
POTASSIUM SERPL-SCNC: 4.3 MMOL/L (ref 3.5–5.2)
PROT SERPL-MCNC: 6.7 G/DL (ref 6–8.5)
PSA SERPL-MCNC: 1.4 NG/ML (ref 0–4)
RBC # BLD AUTO: 5.15 X10E6/UL (ref 4.14–5.8)
SODIUM SERPL-SCNC: 141 MMOL/L (ref 134–144)
TRIGL SERPL-MCNC: 106 MG/DL (ref 0–149)
VLDLC SERPL CALC-MCNC: 20 MG/DL (ref 5–40)
WBC # BLD AUTO: 7.6 X10E3/UL (ref 3.4–10.8)

## 2022-02-01 ENCOUNTER — OFFICE VISIT (OUTPATIENT)
Dept: FAMILY MEDICINE CLINIC | Facility: CLINIC | Age: 62
End: 2022-02-01

## 2022-02-01 VITALS
SYSTOLIC BLOOD PRESSURE: 139 MMHG | TEMPERATURE: 96.6 F | HEART RATE: 88 BPM | WEIGHT: 315 LBS | DIASTOLIC BLOOD PRESSURE: 86 MMHG | BODY MASS INDEX: 38.36 KG/M2 | HEIGHT: 76 IN | OXYGEN SATURATION: 96 %

## 2022-02-01 DIAGNOSIS — R80.9 PROTEINURIA, UNSPECIFIED TYPE: ICD-10-CM

## 2022-02-01 DIAGNOSIS — E11.9 DIABETES MELLITUS WITHOUT COMPLICATION: Primary | ICD-10-CM

## 2022-02-01 PROCEDURE — 99214 OFFICE O/P EST MOD 30 MIN: CPT | Performed by: NURSE PRACTITIONER

## 2022-02-01 NOTE — PROGRESS NOTES
"Chief Complaint  Abnormal Lab (f/u abnormal A1c )    Subjective          Ramy Reyez presents to [facility]  History of Present Illness pt is here for newly dx T2DM in recent labs.  Previously had stable prediabetes.  No s/s hypo/hyperglycemia.  Does have strong FH T2DM.    Spouse has diabetes managed with insulin now. He is o/w feeling well.    Chronic back pain worse on weekends.  OK today.       Objective   Vital Signs:   /86   Pulse 88   Temp 96.6 °F (35.9 °C)   Ht 193 cm (76\")   Wt (!) 163 kg (359 lb)   SpO2 96%   BMI 43.70 kg/m²     Physical Exam  Vitals and nursing note reviewed.   Constitutional:       General: He is not in acute distress.     Appearance: He is well-developed. He is not ill-appearing or diaphoretic.   HENT:      Head: Normocephalic and atraumatic.   Eyes:      General:         Right eye: No discharge.         Left eye: No discharge.      Conjunctiva/sclera: Conjunctivae normal.   Cardiovascular:      Rate and Rhythm: Normal rate. Rhythm irregularly irregular.   Pulmonary:      Effort: Pulmonary effort is normal.      Breath sounds: Normal breath sounds.   Abdominal:      General: Bowel sounds are normal.      Palpations: Abdomen is soft.      Tenderness: There is no abdominal tenderness.   Musculoskeletal:         General: No deformity.      Comments: Gait smooth and steady   Skin:     General: Skin is warm and dry.   Neurological:      Mental Status: He is alert and oriented to person, place, and time.        Result Review :                 Assessment and Plan    Diagnoses and all orders for this visit:    1. Diabetes mellitus without complication (HCC) (Primary)  -     metFORMIN (GLUCOPHAGE) 1000 MG tablet; Take 1 tablet by mouth Daily With Breakfast for 7 days, THEN 1 tablet 2 (Two) Times a Day With Meals for 30 days.  Dispense: 60 tablet; Refill: 3    2. Proteinuria, unspecified type      New dx of diabetes.  Will start with metformin and ramp " after 1 week if kylee.  Side effects discussed.  Carb mgmt discussed.  If not able to kylee metformin or increase will let me know.  Will see him back in 3 months to recheck A1C, BMP.  Will need foot exam next visit and discuss eye exam.     He recently had new proteinuria and with good BP I think this is likely cause.  Will recheck in 3 months. If not improved may need to increase ACE.       Mgmt of LBP discussed.  Topicals, stretching discussed. If worsens will let me know.  Continue prn flexeril.       Follow Up   No follow-ups on file.  Patient was given instructions and counseling regarding his condition or for health maintenance advice. Please see specific information pulled into the AVS if appropriate.

## 2022-02-25 DIAGNOSIS — M54.50 ACUTE RIGHT-SIDED LOW BACK PAIN WITHOUT SCIATICA: ICD-10-CM

## 2022-02-25 RX ORDER — CYCLOBENZAPRINE HCL 10 MG
10 TABLET ORAL NIGHTLY PRN
Qty: 30 TABLET | Refills: 0 | Status: SHIPPED | OUTPATIENT
Start: 2022-02-25 | End: 2022-04-04

## 2022-02-25 NOTE — TELEPHONE ENCOUNTER
Rx Refill Note  Requested Prescriptions     Pending Prescriptions Disp Refills   • cyclobenzaprine (FLEXERIL) 10 MG tablet [Pharmacy Med Name: CYCLOBENZAPRINE 10MG TABLETS] 30 tablet 0     Sig: TAKE 1 TABLET BY MOUTH AT NIGHT AS NEEDED FOR MUSCLE SPASMS. DO NOT TAKE WITH ATIVAN OR ALCOHOL      Last office visit with prescribing clinician: 2/1/2022      Next office visit with prescribing clinician: 5/3/2022            Shakira Street MA  02/25/22, 16:32 EST

## 2022-03-08 DIAGNOSIS — R35.0 URINARY FREQUENCY: ICD-10-CM

## 2022-03-09 RX ORDER — TAMSULOSIN HYDROCHLORIDE 0.4 MG/1
1 CAPSULE ORAL DAILY
Qty: 90 CAPSULE | Refills: 1 | Status: SHIPPED | OUTPATIENT
Start: 2022-03-09 | End: 2022-08-30

## 2022-03-09 RX ORDER — LISINOPRIL AND HYDROCHLOROTHIAZIDE 20; 12.5 MG/1; MG/1
TABLET ORAL
Qty: 90 TABLET | Refills: 0 | Status: SHIPPED | OUTPATIENT
Start: 2022-03-09 | End: 2022-06-07

## 2022-03-14 NOTE — TELEPHONE ENCOUNTER
Echo is scheduled   Would like to speak with a nurse about [facility]  She is waiting for appointment time

## 2022-04-02 DIAGNOSIS — M54.50 ACUTE RIGHT-SIDED LOW BACK PAIN WITHOUT SCIATICA: ICD-10-CM

## 2022-04-04 RX ORDER — CYCLOBENZAPRINE HCL 10 MG
10 TABLET ORAL NIGHTLY PRN
Qty: 30 TABLET | Refills: 0 | Status: SHIPPED | OUTPATIENT
Start: 2022-04-04 | End: 2022-05-02

## 2022-04-30 DIAGNOSIS — M54.50 ACUTE RIGHT-SIDED LOW BACK PAIN WITHOUT SCIATICA: ICD-10-CM

## 2022-05-02 RX ORDER — CYCLOBENZAPRINE HCL 10 MG
10 TABLET ORAL NIGHTLY PRN
Qty: 30 TABLET | Refills: 0 | Status: SHIPPED | OUTPATIENT
Start: 2022-05-02 | End: 2022-06-01

## 2022-05-02 RX ORDER — METAXALONE 800 MG/1
TABLET ORAL
Qty: 90 TABLET | Refills: 0 | Status: SHIPPED | OUTPATIENT
Start: 2022-05-02 | End: 2022-05-31

## 2022-05-02 RX ORDER — RIVAROXABAN 20 MG/1
TABLET, FILM COATED ORAL
Qty: 90 TABLET | Refills: 3 | Status: SHIPPED | OUTPATIENT
Start: 2022-05-02

## 2022-05-03 ENCOUNTER — OFFICE VISIT (OUTPATIENT)
Dept: FAMILY MEDICINE CLINIC | Facility: CLINIC | Age: 62
End: 2022-05-03

## 2022-05-03 VITALS
WEIGHT: 315 LBS | HEIGHT: 76 IN | OXYGEN SATURATION: 97 % | TEMPERATURE: 97.5 F | HEART RATE: 72 BPM | BODY MASS INDEX: 38.36 KG/M2 | DIASTOLIC BLOOD PRESSURE: 77 MMHG | SYSTOLIC BLOOD PRESSURE: 116 MMHG

## 2022-05-03 DIAGNOSIS — G89.29 CHRONIC BILATERAL LOW BACK PAIN WITHOUT SCIATICA: Chronic | ICD-10-CM

## 2022-05-03 DIAGNOSIS — M54.50 CHRONIC BILATERAL LOW BACK PAIN WITHOUT SCIATICA: Chronic | ICD-10-CM

## 2022-05-03 DIAGNOSIS — E11.9 DIABETES MELLITUS WITHOUT COMPLICATION: Primary | ICD-10-CM

## 2022-05-03 DIAGNOSIS — Z79.01 ANTICOAGULATED BY ANTICOAGULATION TREATMENT: Chronic | ICD-10-CM

## 2022-05-03 DIAGNOSIS — I10 ESSENTIAL HYPERTENSION: Chronic | ICD-10-CM

## 2022-05-03 PROCEDURE — 99214 OFFICE O/P EST MOD 30 MIN: CPT | Performed by: NURSE PRACTITIONER

## 2022-05-03 NOTE — PROGRESS NOTES
"Chief Complaint  Diabetes and Hypertension    Subjective          Ramy Reyez presents to [facility]  History of Present Illness pt is here for T2DM f/u. His most recent A1C has gone up from prediabetic range to now diabetes.  He has started metformin and tolerating well.  Taking max dose without side effects.  Since then, he has decreased etoh consumption-drinking more than he knows he should franki hard liquor on weekends.  He has been in and out of AA in past and started going again recently.    Denies abd pain, n/v/d since starting metformin.  He denies s/s hypo/hyperglycemia.     Still has chronic back pain.  Worsens at work where he stands a lot and does a lot of repetitive motion.  Franki twisting from waist.  He notices LBP even when getting in and out of bed.  Flexeril nightly seems to be most helpful.  He would like to continue.  Not used skelaxin recently-forgot he had it.  No new injuries.  No BB change or saddle anaesthesia.  Has done PT in distant past and does not want to try again now. He does not think he needs pain mgmt.  Follows with chiropractor prn.     Has moved lisinopril-HCTZ to early evening when he returns from work and working better for him.  He was having to urinate frequently at work and long distance to [place].  Now wakes up once at night usually to urinate.  Urinates more then, but not more frequency.  No chest pain, palpitations, HA, dizziness, dyspnea, cough.  No increase in chronic b/l leg swelling.  Usually has compression.     Doing well on on current xarelto.  No s/s bleeding/bruising.  Does not miss doses.          Objective   Vital Signs:   /77   Pulse 72   Temp 97.5 °F (36.4 °C) (Temporal)   Ht 193 cm (76\")   Wt (!) 160 kg (352 lb 12.8 oz)   SpO2 97%   BMI 42.94 kg/m²     Physical Exam  Vitals and nursing note reviewed.   Constitutional:       General: He is not in acute distress.     Appearance: He is well-developed. He is not ill-appearing " or diaphoretic.   HENT:      Head: Normocephalic and atraumatic.   Eyes:      General:         Right eye: No discharge.         Left eye: No discharge.      Conjunctiva/sclera: Conjunctivae normal.   Cardiovascular:      Rate and Rhythm: Normal rate and regular rhythm.      Pulses:           Dorsalis pedis pulses are 1+ on the right side and 1+ on the left side.      Heart sounds: Normal heart sounds.   Pulmonary:      Effort: Pulmonary effort is normal.      Breath sounds: Normal breath sounds.   Abdominal:      General: Bowel sounds are normal.      Palpations: Abdomen is soft.      Tenderness: There is no abdominal tenderness.   Musculoskeletal:         General: No deformity.      Comments: Gait smooth and steady   Feet:      Right foot:      Protective Sensation: 10 sites tested. 9 sites sensed.      Skin integrity: Skin integrity normal.      Toenail Condition: Right toenails are normal.      Left foot:      Protective Sensation: 10 sites tested. 9 sites sensed.      Skin integrity: Skin integrity normal.      Toenail Condition: Left toenails are normal.      Comments: B/l LE with chronic non pitting edema, multiple spider veins throughout LE  Good sensation b/l other than he could not feel heels b/l  Skin:     General: Skin is warm and dry.   Neurological:      Mental Status: He is alert and oriented to person, place, and time.        Result Review :                 Assessment and Plan    Diagnoses and all orders for this visit:    1. Diabetes mellitus without complication (HCC) (Primary)    2. Chronic bilateral low back pain without sciatica    3. Essential hypertension    4. Anticoagulated by anticoagulation treatment      T2DM:  New dx.  Previously prediabetes.  Made dietary/lifestyle changes.  Taking and kylee max dose metformin.  May be able to stay at goal with these changes.  We discussed other medication options if needed.  He prefers weekly injectable which may also help with weight loss. Foot exam  done, foot care discussed.  Recommend diabetic eye exam. A1C today.  Recommend pneumovax which he will consider.       LBP:  Discussed PT for safe lifting assistance which he declines at this point.  Would like to continue flexeril  Prn and will take skelaxin prn. MRI done last summer by chiropractor showed DDD with some disc bulge in L spine, posterior peripheral annular tear L4-L5.  He has neural encroachment but no compression.  Pain mgmt may be good option for possible injections.  He will let me know if any worsening.  No RED FLAGS.  We discussed s/s requiring emergent eval.     HTN:  Tolerating ACE/HCTZ better in afternoon due to urination freq at work.  BP is good today.  Better compliance.  Will recheck urine micro today to see if improving.      Chronic anticoagulation for PAF.  No s/s RVR.  Doing well on xarelto-no missed doses.  Will continue.  Follows with cardiology.                    Follow Up   No follow-ups on file.  Patient was given instructions and counseling regarding his condition or for health maintenance advice. Please see specific information pulled into the AVS if appropriate.

## 2022-05-04 DIAGNOSIS — E11.9 DIABETES MELLITUS WITHOUT COMPLICATION: ICD-10-CM

## 2022-05-04 PROBLEM — Z79.01 ANTICOAGULATED BY ANTICOAGULATION TREATMENT: Status: ACTIVE | Noted: 2022-05-04

## 2022-05-04 PROBLEM — M54.50 CHRONIC BILATERAL LOW BACK PAIN WITHOUT SCIATICA: Status: ACTIVE | Noted: 2022-05-04

## 2022-05-04 PROBLEM — G89.29 CHRONIC BILATERAL LOW BACK PAIN WITHOUT SCIATICA: Status: ACTIVE | Noted: 2022-05-04

## 2022-05-31 DIAGNOSIS — M54.50 ACUTE RIGHT-SIDED LOW BACK PAIN WITHOUT SCIATICA: ICD-10-CM

## 2022-05-31 RX ORDER — METAXALONE 800 MG/1
TABLET ORAL
Qty: 90 TABLET | Refills: 0 | Status: SHIPPED | OUTPATIENT
Start: 2022-05-31 | End: 2022-07-05

## 2022-06-01 RX ORDER — CYCLOBENZAPRINE HCL 10 MG
10 TABLET ORAL NIGHTLY PRN
Qty: 30 TABLET | Refills: 0 | Status: SHIPPED | OUTPATIENT
Start: 2022-06-01 | End: 2022-07-05

## 2022-06-05 DIAGNOSIS — E11.9 DIABETES MELLITUS WITHOUT COMPLICATION: ICD-10-CM

## 2022-06-07 RX ORDER — LISINOPRIL AND HYDROCHLOROTHIAZIDE 20; 12.5 MG/1; MG/1
TABLET ORAL
Qty: 90 TABLET | Refills: 0 | Status: SHIPPED | OUTPATIENT
Start: 2022-06-07 | End: 2022-07-22

## 2022-06-20 RX ORDER — METOPROLOL TARTRATE 100 MG/1
TABLET ORAL
Qty: 180 TABLET | Refills: 3 | Status: SHIPPED | OUTPATIENT
Start: 2022-06-20

## 2022-07-04 DIAGNOSIS — M54.50 ACUTE RIGHT-SIDED LOW BACK PAIN WITHOUT SCIATICA: ICD-10-CM

## 2022-07-05 RX ORDER — CYCLOBENZAPRINE HCL 10 MG
10 TABLET ORAL NIGHTLY PRN
Qty: 30 TABLET | Refills: 0 | Status: SHIPPED | OUTPATIENT
Start: 2022-07-05 | End: 2022-08-05

## 2022-07-05 RX ORDER — METAXALONE 800 MG/1
TABLET ORAL
Qty: 90 TABLET | Refills: 0 | Status: SHIPPED | OUTPATIENT
Start: 2022-07-05 | End: 2022-11-07

## 2022-07-05 NOTE — TELEPHONE ENCOUNTER
Rx Refill Note  Requested Prescriptions     Pending Prescriptions Disp Refills   • cyclobenzaprine (FLEXERIL) 10 MG tablet [Pharmacy Med Name: CYCLOBENZAPRINE 10MG TABLETS] 30 tablet 0     Sig: TAKE 1 TABLET BY MOUTH AT NIGHT AS NEEDED FOR MUSCLE SPASMS. DO NOT TAKE WITH ATIVAN OR ALCOHOL      Last office visit with prescribing clinician: 5/3/2022      Next office visit with prescribing clinician: 7/27/2022            Shakira Street MA  07/05/22, 08:48 EDT

## 2022-07-05 NOTE — TELEPHONE ENCOUNTER
Rx Refill Note  Requested Prescriptions     Pending Prescriptions Disp Refills   • metaxalone (SKELAXIN) 800 MG tablet [Pharmacy Med Name: METAXALONE 800MG TABLETS] 90 tablet 0     Sig: TAKE 1 TABLET BY MOUTH THREE TIMES DAILY AS NEEDED      Last office visit with prescribing clinician: 05/03/2022      /Next office visit with prescribing clinician: 07/27/2022           Manisha Bo MA  07/05/22, 08:36 EDT

## 2022-07-08 ENCOUNTER — TELEPHONE (OUTPATIENT)
Dept: CARDIOLOGY | Facility: CLINIC | Age: 62
End: 2022-07-08

## 2022-07-08 NOTE — TELEPHONE ENCOUNTER
----- Message from Melvi Ruiz MA sent at 6/7/2022  7:56 AM EDT -----  Pt needs a follow up appt for medication refill. LOV 3-2021...Melvi

## 2022-07-08 NOTE — TELEPHONE ENCOUNTER
Called Ramy Reyez and he is scheduled to see ALBERTO Oneill on 8/23 at 9am.    Thank you,  Martha Hendrickson RN  Triage Nurse G

## 2022-07-08 NOTE — TELEPHONE ENCOUNTER
Patient is scheduled to see Zora DASH in January.  Please move that patient up sooner per our EP team as patient recently had refills sent in.  Thank you.

## 2022-07-08 NOTE — TELEPHONE ENCOUNTER
There are no openings on Zora's schedule until October and that's a hospital follow up spot.  Is there a day that this patient can be worked in before then? Or is an October appointment ok?    Thank you,  Martha Hendrickson RN  Triage Nurse MEGGAN

## 2022-07-22 RX ORDER — LISINOPRIL AND HYDROCHLOROTHIAZIDE 20; 12.5 MG/1; MG/1
TABLET ORAL
Qty: 90 TABLET | Refills: 0 | Status: SHIPPED | OUTPATIENT
Start: 2022-07-22 | End: 2022-11-16

## 2022-07-27 ENCOUNTER — OFFICE VISIT (OUTPATIENT)
Dept: FAMILY MEDICINE CLINIC | Facility: CLINIC | Age: 62
End: 2022-07-27

## 2022-07-27 VITALS
DIASTOLIC BLOOD PRESSURE: 74 MMHG | HEIGHT: 76 IN | HEART RATE: 75 BPM | RESPIRATION RATE: 18 BRPM | OXYGEN SATURATION: 97 % | WEIGHT: 315 LBS | BODY MASS INDEX: 38.36 KG/M2 | SYSTOLIC BLOOD PRESSURE: 112 MMHG | TEMPERATURE: 96.3 F

## 2022-07-27 DIAGNOSIS — M54.50 CHRONIC BILATERAL LOW BACK PAIN WITHOUT SCIATICA: ICD-10-CM

## 2022-07-27 DIAGNOSIS — I48.20 ATRIAL FIBRILLATION, CHRONIC: ICD-10-CM

## 2022-07-27 DIAGNOSIS — E66.01 OBESITY, CLASS III, BMI 40-49.9 (MORBID OBESITY): ICD-10-CM

## 2022-07-27 DIAGNOSIS — Z79.01 ANTICOAGULATED BY ANTICOAGULATION TREATMENT: ICD-10-CM

## 2022-07-27 DIAGNOSIS — I10 ESSENTIAL HYPERTENSION: ICD-10-CM

## 2022-07-27 DIAGNOSIS — K64.9 HEMORRHOIDS, UNSPECIFIED HEMORRHOID TYPE: ICD-10-CM

## 2022-07-27 DIAGNOSIS — E11.9 DIABETES MELLITUS WITHOUT COMPLICATION: Primary | ICD-10-CM

## 2022-07-27 DIAGNOSIS — G89.29 CHRONIC BILATERAL LOW BACK PAIN WITHOUT SCIATICA: ICD-10-CM

## 2022-07-27 PROBLEM — E66.813 OBESITY, CLASS III, BMI 40-49.9 (MORBID OBESITY): Status: ACTIVE | Noted: 2022-07-27

## 2022-07-27 PROCEDURE — 99214 OFFICE O/P EST MOD 30 MIN: CPT | Performed by: NURSE PRACTITIONER

## 2022-07-27 NOTE — PROGRESS NOTES
Chief Complaint  Diabetes (F/U)    Subjective        Ramy Reyez presents to [facility]  History of Present Illness    The patient states he is doing well on his regimen of metformin. His exercise and diet are adequate; however, he does not see any improvement in his weight loss. He does admit to not changing his portion size.  The patient has decreased his sugar intake and he notes his watch reminds him to “get up and move”. He does not eat ice cream late at night anymore. The patient reports his appetite is doing well. His last HbA1c was obtained in 05/22 with a reading of 6.3 percent which is decreased from 6.86 percent in 01/2022.      The patient continues on all his medications as prescribed.        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  He attends meetings, has a sponsor, and states is not sponsoring anyone at present as he is “working through the steps”.     The patient notes his place of employment is not climate controlled; however, there are multiple fans in the area where he works. He denies feeling dizzy, or feeling as if he will experience syncope.        The patient complains of hemorrhoids and is applying a topical ointment.       The patient endorses taking Skelaxin, for his back pain. He reports presenting to his chiropractor 4 times weekly in [facility] but became expensive.  Now using muscle relaxers with good results.     The patient states he continues using his CPAP machine.      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 He denies using a scrotal sling.  Found to have a cystocele and was offered surgical intervention but for now patient is waiting and watching.    The patient denies having his annual diabetic eye examination; however, states he will make an appointment for this.        Objective   Vital Signs:  /74 (BP Location: Left arm, Patient Position: Sitting, Cuff  "Size: Adult)   Pulse 75   Temp 96.3 °F (35.7 °C) (Temporal)   Resp 18   Ht 193 cm (75.98\")   Wt (!) 161 kg (354 lb 11.2 oz)   SpO2 97%   BMI 43.19 kg/m²   Estimated body mass index is 43.19 kg/m² as calculated from the following:    Height as of this encounter: 193 cm (75.98\").    Weight as of this encounter: 161 kg (354 lb 11.2 oz).    Class 3 Severe Obesity (BMI >=40). Obesity-related health conditions include the following: obstructive sleep apnea, hypertension, diabetes mellitus, dyslipidemias and osteoarthritis. Obesity is unchanged. BMI is is above average; BMI management plan is completed. We discussed portion control, increasing exercise and pharmacologic options including GLP-1 agonist.      Physical Exam  Vitals and nursing note reviewed.   Constitutional:       General: He is not in acute distress.     Appearance: He is well-developed. He is obese. He is not ill-appearing or diaphoretic.   HENT:      Head: Normocephalic and atraumatic.   Eyes:      General:         Right eye: No discharge.         Left eye: No discharge.      Conjunctiva/sclera: Conjunctivae normal.   Cardiovascular:      Rate and Rhythm: Normal rate. Rhythm irregularly irregular.      Heart sounds: Normal heart sounds.   Pulmonary:      Effort: Pulmonary effort is normal.      Breath sounds: Normal breath sounds.   Abdominal:      General: Bowel sounds are normal.      Palpations: Abdomen is soft.      Tenderness: There is no abdominal tenderness.   Musculoskeletal:         General: No deformity.      Lumbar back: No deformity.      Comments: Gait smooth and steady   Skin:     General: Skin is warm and dry.      Findings: No bruising.   Neurological:      General: No focal deficit present.      Mental Status: He is alert and oriented to person, place, and time.   Psychiatric:         Mood and Affect: Mood normal.         Behavior: Behavior normal.         Thought Content: Thought content normal.        Result Review :              "   Assessment and Plan   Diagnoses and all orders for this visit:    1. Diabetes mellitus without complication (HCC) (Primary)  -     Hemoglobin A1c; Future    2. Chronic bilateral low back pain without sciatica    3. Atrial fibrillation, chronic    4. Essential hypertension    5. Anticoagulated by anticoagulation treatment    6. Hemorrhoids, unspecified hemorrhoid type    7. Obesity, Class III, BMI 40-49.9 (morbid obesity) (HCC)        Diabetes mellitus   He is to continue metformin, diet, and exercise. His HbA1c will be repeated in 08/2022 and in 11/2022. He will continue metformin even if his HbA1c reading is improved.  We discussed adding additional medication such as GLP-1 agonist which would also assist with weight loss.  Patient prefers weekly injection over pills and would like to try this as he is really struggling with weight.  No refills needed today.    Hemorrhoids   He will continue applying topical ointment. He is advised that wiping will further irritate his hemorrhoid and to instead rinse with a og bottle as discussed.  If no better or worsening let me know.  Can give prescription medication if OTC medication not working.    Chronic back pain: Managed with current medication.  No red flags in history or exam.  We will continue current Flexeril and Skelaxin.  No refill needed today.    Seems to be doing well with A. fib and anticoagulation.  Compliant with treatment.  Upcoming cardiology visit in approximately 1 month.         Follow Up   Return in about 15 weeks (around 11/9/2022) for Recheck.  Patient was given instructions and counseling regarding his condition or for health maintenance advice. Please see specific information pulled into the AVS if appropriate.     Transcribed from ambient dictation for HARDY Ortega by Micaela Ny.  07/27/22   13:10 EDT    Patient verbalized consent to the visit recording.  I have personally performed the services described in this document as  transcribed by the above individual, and it is both accurate and complete.  Darlin Catalan, HARDY  7/27/2022  18:18 EDT

## 2022-07-28 ENCOUNTER — TELEPHONE (OUTPATIENT)
Dept: FAMILY MEDICINE CLINIC | Facility: CLINIC | Age: 62
End: 2022-07-28

## 2022-07-28 NOTE — TELEPHONE ENCOUNTER
Pharmacy Name:  [id number] - [place], [place] - [address] AT [facility] & [address] - [phone]  - [phone] FX (Pharmacy)    Pharmacy representative name: JAMES    Pharmacy representative phone number: [phone]    What medication are you calling in regards to: OZEMPIC 1MG    What question does the pharmacy have: NEED SEPARATE PRESCRIPTION DIRECTIONS FOR THIS BECAUSE IT COMES AS A SEPARATE PEN FOR WHEN THE PATIENT STARTS THIS IN A MONTH AND A HALF    Who is the provider that prescribed the medication: [facility]    Additional notes: PLEASE SEND SEPARATE DIRECTIONS FOR PHARMACY

## 2022-08-01 RX ORDER — DILTIAZEM HYDROCHLORIDE 60 MG/1
TABLET, FILM COATED ORAL
Qty: 270 TABLET | Refills: 0 | Status: SHIPPED | OUTPATIENT
Start: 2022-08-01 | End: 2023-03-31

## 2022-08-02 DIAGNOSIS — I87.2 VENOUS STASIS DERMATITIS OF BOTH LOWER EXTREMITIES: Primary | ICD-10-CM

## 2022-08-04 ENCOUNTER — TELEPHONE (OUTPATIENT)
Dept: FAMILY MEDICINE CLINIC | Facility: CLINIC | Age: 62
End: 2022-08-04

## 2022-08-04 NOTE — TELEPHONE ENCOUNTER
Hub staff attempted to follow warm transfer process and was unsuccessful     Caller: Ramy Reyez    Relationship to patient: Self    Best call back number: [phone]    Patient is needing: PATIENT IS CALLING IN REGARDS TO A RASH ON HIS LEG THAT HE HAS DISCUSSED WITH [facility]. PATIENT STATED THAT IT HAS TURNED INTO CELLULITIS. PATIENT STATED [facility] ADVISED PATIENT TO COME IN FOR AN APPOINTMENT IF IT BECAME THAT. NO SAME DAY APPOINTMENTS ARE AVAILABLE. PLEASE ADVISE.

## 2022-08-04 NOTE — TELEPHONE ENCOUNTER
Called patient back, no answer [facility]. Hub please advise patient, no appointments were left for today 8/4/22 but Darlin did have one more appointment for tomorrow at 3PM so I went ahead and stuck him in that spot. If this does not work for him can try and be scheduled with someone else tomorrow and if that doesn't work next result would be for patient to go to .

## 2022-08-05 DIAGNOSIS — M54.50 ACUTE RIGHT-SIDED LOW BACK PAIN WITHOUT SCIATICA: ICD-10-CM

## 2022-08-05 RX ORDER — CYCLOBENZAPRINE HCL 10 MG
10 TABLET ORAL NIGHTLY PRN
Qty: 90 TABLET | Refills: 0 | Status: SHIPPED | OUTPATIENT
Start: 2022-08-05 | End: 2022-12-16

## 2022-08-05 NOTE — TELEPHONE ENCOUNTER
Rx Refill Note  Requested Prescriptions     Pending Prescriptions Disp Refills   • cyclobenzaprine (FLEXERIL) 10 MG tablet [Pharmacy Med Name: CYCLOBENZAPRINE 10MG TABLETS] 30 tablet 0     Sig: TAKE 1 TABLET BY MOUTH AT NIGHT AS NEEDED FOR MUSCLE SPASMS. DO NOT TAKE WITH ATIVAN OR ALCOHOL      Last office visit with prescribing clinician: 7/27/2022      Next office visit with prescribing clinician: 11/16/2022       {TIP  Please add Last Relevant Lab Date if appropriate: 5/3/22    CARIDAD Weaver  08/05/22, 16:01 EDT

## 2022-08-27 DIAGNOSIS — R35.0 URINARY FREQUENCY: ICD-10-CM

## 2022-08-29 NOTE — TELEPHONE ENCOUNTER
Rx Refill Note  Requested Prescriptions     Pending Prescriptions Disp Refills   • tamsulosin (FLOMAX) 0.4 MG capsule 24 hr capsule [Pharmacy Med Name: TAMSULOSIN 0.4MG CAPSULES] 90 capsule 1     Sig: TAKE 1 CAPSULE BY MOUTH DAILY      Last office visit with prescribing clinician: 7/27/2022      Next office visit with prescribing clinician: 11/16/2022            Shakira Street MA  08/29/22, 10:29 EDT

## 2022-08-30 RX ORDER — TAMSULOSIN HYDROCHLORIDE 0.4 MG/1
1 CAPSULE ORAL DAILY
Qty: 90 CAPSULE | Refills: 1 | Status: SHIPPED | OUTPATIENT
Start: 2022-08-30 | End: 2023-02-27

## 2022-09-06 ENCOUNTER — TELEPHONE (OUTPATIENT)
Dept: CARDIOLOGY | Facility: CLINIC | Age: 62
End: 2022-09-06

## 2022-09-06 NOTE — TELEPHONE ENCOUNTER
Received a fax from [facility] stating pt is scheduling a Hydrocelectomy with Dr. Grullon. He is asking if pt can move forward and if he can hold his rivaroxaban 3 days prior to procedure. Pt has NO history of stroke or valve replacement...Melvi

## 2022-09-13 DIAGNOSIS — E11.9 DIABETES MELLITUS WITHOUT COMPLICATION: Primary | ICD-10-CM

## 2022-11-07 RX ORDER — METAXALONE 800 MG/1
TABLET ORAL
Qty: 90 TABLET | Refills: 0 | Status: SHIPPED | OUTPATIENT
Start: 2022-11-07 | End: 2023-01-25

## 2022-11-16 ENCOUNTER — OFFICE VISIT (OUTPATIENT)
Dept: FAMILY MEDICINE CLINIC | Facility: CLINIC | Age: 62
End: 2022-11-16

## 2022-11-16 VITALS
DIASTOLIC BLOOD PRESSURE: 74 MMHG | TEMPERATURE: 95.9 F | HEIGHT: 76 IN | OXYGEN SATURATION: 100 % | BODY MASS INDEX: 38.36 KG/M2 | HEART RATE: 78 BPM | WEIGHT: 315 LBS | SYSTOLIC BLOOD PRESSURE: 109 MMHG

## 2022-11-16 DIAGNOSIS — D62 ACUTE BLOOD LOSS ANEMIA: ICD-10-CM

## 2022-11-16 DIAGNOSIS — E11.9 DIABETES MELLITUS WITHOUT COMPLICATION: Primary | Chronic | ICD-10-CM

## 2022-11-16 DIAGNOSIS — I10 ESSENTIAL HYPERTENSION: Chronic | ICD-10-CM

## 2022-11-16 DIAGNOSIS — E66.01 OBESITY, CLASS III, BMI 40-49.9 (MORBID OBESITY): Chronic | ICD-10-CM

## 2022-11-16 PROBLEM — S30.22XA SCROTAL HEMATOMA: Status: ACTIVE | Noted: 2022-10-14

## 2022-11-16 PROCEDURE — 99214 OFFICE O/P EST MOD 30 MIN: CPT | Performed by: NURSE PRACTITIONER

## 2022-11-16 RX ORDER — METHYLPHENIDATE HYDROCHLORIDE 20 MG/1
TABLET ORAL
COMMUNITY
Start: 2022-10-27

## 2022-11-16 RX ORDER — SEMAGLUTIDE 1.34 MG/ML
INJECTION, SOLUTION SUBCUTANEOUS
COMMUNITY
Start: 2022-09-20 | End: 2022-11-18

## 2022-11-16 NOTE — PROGRESS NOTES
"Chief Complaint  Diabetes (F/u dm )    Subjective        Ramy Reyez presents to [facility]  History of Present Illness patient is here for follow-up on type 2 diabetes.  He has been taking and tolerating his metformin twice daily without side effects.  We had recently added Ozempic and he is currently taking the 1 mg dose.  He has noticed that it has really decreased his appetite and he has had a significant intentional weight loss.  Since July he has lost approximately 40 pounds.  He has cut out sugars and decreased his portion sizes.  Is happy with the weight loss and denies any side effects from the Ozempic and would like to continue.    Also increasing his weight loss that has been complications from a recent hydrocele repair.  He is on Xarelto for chronic A. fib which was stopped couple days prior to hydrocele repair which patient tolerated well.  When Xarelto was restarted patient developed bleeding in his testes and had to have emergent surgery to repair.  He had drain placed as well as a Franco cath.  Hematoma resolved and there is no evidence of review current.  He has been following up with urology weekly for evaluations.  He was noted to be anemic with hemoglobin around 9.5.  He reports these have been checked by urology and he thinks they are going up but not sure of exact numbers.  He does not have significant fatigue but has not returned to work yet.      He does notice orthostasis upon standing which is new over the last month or so.  Otherwise he has had no problems.    Most recent CBC was done yesterday at urology.            Objective   Vital Signs:  /74   Pulse 78   Temp 95.9 °F (35.5 °C)   Ht 193 cm (76\")   Wt (!) 144 kg (317 lb)   SpO2 100%   BMI 38.59 kg/m²   Estimated body mass index is 38.59 kg/m² as calculated from the following:    Height as of this encounter: 193 cm (76\").    Weight as of this encounter: 144 kg (317 lb).    Class 2 Severe " Obesity (BMI >=35 and <=39.9). Obesity-related health conditions include the following: obstructive sleep apnea, hypertension, diabetes mellitus, dyslipidemias, GERD and osteoarthritis. Obesity is improving with treatment. BMI is is above average; BMI management plan is completed. We discussed portion control, increasing exercise and pharmacologic options including ozempic.      Physical Exam  Vitals and nursing note reviewed.   Constitutional:       General: He is not in acute distress.     Appearance: He is well-developed. He is not ill-appearing or diaphoretic.   HENT:      Head: Normocephalic and atraumatic.   Eyes:      General:         Right eye: No discharge.         Left eye: No discharge.      Conjunctiva/sclera: Conjunctivae normal.   Cardiovascular:      Rate and Rhythm: Normal rate. Rhythm irregularly irregular.      Heart sounds: Normal heart sounds.   Pulmonary:      Effort: Pulmonary effort is normal.      Breath sounds: Normal breath sounds.   Abdominal:      General: Bowel sounds are normal.      Palpations: Abdomen is soft.      Tenderness: There is no abdominal tenderness.   Musculoskeletal:         General: No deformity.      Comments: Gait smooth and steady   Skin:     General: Skin is warm and dry.      Coloration: Skin is pale.   Neurological:      General: No focal deficit present.      Mental Status: He is alert and oriented to person, place, and time.   Psychiatric:         Mood and Affect: Mood normal.         Behavior: Behavior normal.         Thought Content: Thought content normal.      Comments: Very pleasant, conversant, good medical historian        Result Review :                Assessment and Plan   Diagnoses and all orders for this visit:    1. Diabetes mellitus without complication (HCC) (Primary)  -     Hemoglobin A1c  -     Lipid Panel With LDL / HDL Ratio    2. Essential hypertension    3. Acute blood loss anemia    4. Obesity, Class III, BMI 40-49.9 (morbid obesity)  (HCC)      We will get an A1c today to monitor.  He would like to continue the Ozempic which I am inclined to do since he has had significant improvement in morbid obesity.  If A1c is low we can consider decreasing the metformin which patient would be happy to do.  We will continue current 1 mg dose of Ozempic.    Hypertension: His blood pressure is on the low side and he is reporting orthostasis.  Has had significant weight loss and probably still anemic with low hematocrit second postop bleeding.  I think we can stop his lisinopril for now and monitor blood pressure.  Hopefully with his weight loss he will not need that restarted.  He will continue current Cardizem for A. fib control.  If blood pressure starts to increase as anemia resolves he can restart current lisinopril/HCTZ and let me know.    He will continue follow-ups with urology.  Will let me know if he needs any further evaluation for acute blood loss anemia.  We will request records from urology.  I have reviewed hospital note and labs from 10/10/22  visit.    Reaching 1 year sobriety and feeling well.               Follow Up   Return in about 3 months (around 2/16/2023) for Recheck.  Patient was given instructions and counseling regarding his condition or for health maintenance advice. Please see specific information pulled into the AVS if appropriate.

## 2022-11-17 DIAGNOSIS — E11.9 DIABETES MELLITUS WITHOUT COMPLICATION: ICD-10-CM

## 2022-11-17 LAB
CHOLEST SERPL-MCNC: 121 MG/DL (ref 0–200)
HBA1C MFR BLD: 5.4 % (ref 4.8–5.6)
HDLC SERPL-MCNC: 37 MG/DL (ref 40–60)
LDLC SERPL CALC-MCNC: 64 MG/DL (ref 0–100)
LDLC/HDLC SERPL: 1.7 {RATIO}
TRIGL SERPL-MCNC: 105 MG/DL (ref 0–150)
VLDLC SERPL CALC-MCNC: 20 MG/DL (ref 5–40)

## 2022-11-18 RX ORDER — SEMAGLUTIDE 1.34 MG/ML
INJECTION, SOLUTION SUBCUTANEOUS
Qty: 6 ML | Refills: 2 | Status: SHIPPED | OUTPATIENT
Start: 2022-11-18 | End: 2023-02-16 | Stop reason: SDUPTHER

## 2022-12-09 ENCOUNTER — LAB (OUTPATIENT)
Dept: LAB | Facility: HOSPITAL | Age: 62
End: 2022-12-09

## 2022-12-09 ENCOUNTER — OFFICE VISIT (OUTPATIENT)
Dept: FAMILY MEDICINE CLINIC | Facility: CLINIC | Age: 62
End: 2022-12-09

## 2022-12-09 VITALS
DIASTOLIC BLOOD PRESSURE: 80 MMHG | WEIGHT: 315 LBS | BODY MASS INDEX: 38.36 KG/M2 | SYSTOLIC BLOOD PRESSURE: 122 MMHG | HEART RATE: 72 BPM | TEMPERATURE: 96.4 F | HEIGHT: 76 IN | OXYGEN SATURATION: 100 %

## 2022-12-09 DIAGNOSIS — R42 ORTHOSTATIC DIZZINESS: Primary | ICD-10-CM

## 2022-12-09 DIAGNOSIS — Z12.11 SCREEN FOR COLON CANCER: ICD-10-CM

## 2022-12-09 DIAGNOSIS — D50.9 IRON DEFICIENCY ANEMIA, UNSPECIFIED IRON DEFICIENCY ANEMIA TYPE: Primary | ICD-10-CM

## 2022-12-09 DIAGNOSIS — Z79.01 ANTICOAGULATED BY ANTICOAGULATION TREATMENT: ICD-10-CM

## 2022-12-09 LAB
ALBUMIN SERPL-MCNC: 4.3 G/DL (ref 3.5–5.2)
ALBUMIN/GLOB SERPL: 1.7 G/DL
ALP SERPL-CCNC: 85 U/L (ref 39–117)
ALT SERPL W P-5'-P-CCNC: 17 U/L (ref 1–41)
ANION GAP SERPL CALCULATED.3IONS-SCNC: 9.5 MMOL/L (ref 5–15)
AST SERPL-CCNC: 17 U/L (ref 1–40)
BASOPHILS # BLD AUTO: 0.05 10*3/MM3 (ref 0–0.2)
BASOPHILS NFR BLD AUTO: 0.7 % (ref 0–1.5)
BILIRUB SERPL-MCNC: 0.4 MG/DL (ref 0–1.2)
BUN SERPL-MCNC: 11 MG/DL (ref 8–23)
BUN/CREAT SERPL: 9 (ref 7–25)
CALCIUM SPEC-SCNC: 9.6 MG/DL (ref 8.6–10.5)
CHLORIDE SERPL-SCNC: 103 MMOL/L (ref 98–107)
CO2 SERPL-SCNC: 27.5 MMOL/L (ref 22–29)
CREAT SERPL-MCNC: 1.22 MG/DL (ref 0.76–1.27)
DEPRECATED RDW RBC AUTO: 47.2 FL (ref 37–54)
EGFRCR SERPLBLD CKD-EPI 2021: 66.6 ML/MIN/1.73
EOSINOPHIL # BLD AUTO: 0.17 10*3/MM3 (ref 0–0.4)
EOSINOPHIL NFR BLD AUTO: 2.2 % (ref 0.3–6.2)
ERYTHROCYTE [DISTWIDTH] IN BLOOD BY AUTOMATED COUNT: 16.5 % (ref 12.3–15.4)
GLOBULIN UR ELPH-MCNC: 2.6 GM/DL
GLUCOSE SERPL-MCNC: 118 MG/DL (ref 65–99)
HCT VFR BLD AUTO: 40.2 % (ref 37.5–51)
HGB BLD-MCNC: 11.8 G/DL (ref 13–17.7)
IMM GRANULOCYTES # BLD AUTO: 0.02 10*3/MM3 (ref 0–0.05)
IMM GRANULOCYTES NFR BLD AUTO: 0.3 % (ref 0–0.5)
IRON 24H UR-MRATE: 35 MCG/DL (ref 59–158)
IRON SATN MFR SERPL: 6 % (ref 20–50)
LYMPHOCYTES # BLD AUTO: 1.71 10*3/MM3 (ref 0.7–3.1)
LYMPHOCYTES NFR BLD AUTO: 22.3 % (ref 19.6–45.3)
MCH RBC QN AUTO: 23.3 PG (ref 26.6–33)
MCHC RBC AUTO-ENTMCNC: 29.4 G/DL (ref 31.5–35.7)
MCV RBC AUTO: 79.3 FL (ref 79–97)
MONOCYTES # BLD AUTO: 0.78 10*3/MM3 (ref 0.1–0.9)
MONOCYTES NFR BLD AUTO: 10.2 % (ref 5–12)
NEUTROPHILS NFR BLD AUTO: 4.95 10*3/MM3 (ref 1.7–7)
NEUTROPHILS NFR BLD AUTO: 64.3 % (ref 42.7–76)
NRBC BLD AUTO-RTO: 0 /100 WBC (ref 0–0.2)
PLATELET # BLD AUTO: 220 10*3/MM3 (ref 140–450)
PMV BLD AUTO: 8.9 FL (ref 6–12)
POTASSIUM SERPL-SCNC: 4.5 MMOL/L (ref 3.5–5.2)
PROT SERPL-MCNC: 6.9 G/DL (ref 6–8.5)
RBC # BLD AUTO: 5.07 10*6/MM3 (ref 4.14–5.8)
SODIUM SERPL-SCNC: 140 MMOL/L (ref 136–145)
TIBC SERPL-MCNC: 547 MCG/DL (ref 298–536)
TRANSFERRIN SERPL-MCNC: 367 MG/DL (ref 200–360)
WBC NRBC COR # BLD: 7.68 10*3/MM3 (ref 3.4–10.8)

## 2022-12-09 PROCEDURE — 80053 COMPREHEN METABOLIC PANEL: CPT | Performed by: NURSE PRACTITIONER

## 2022-12-09 PROCEDURE — 36415 COLL VENOUS BLD VENIPUNCTURE: CPT | Performed by: NURSE PRACTITIONER

## 2022-12-09 PROCEDURE — 83540 ASSAY OF IRON: CPT | Performed by: NURSE PRACTITIONER

## 2022-12-09 PROCEDURE — 99214 OFFICE O/P EST MOD 30 MIN: CPT | Performed by: NURSE PRACTITIONER

## 2022-12-09 PROCEDURE — 84466 ASSAY OF TRANSFERRIN: CPT | Performed by: NURSE PRACTITIONER

## 2022-12-09 PROCEDURE — 85025 COMPLETE CBC W/AUTO DIFF WBC: CPT | Performed by: NURSE PRACTITIONER

## 2022-12-09 RX ORDER — DOXYCYCLINE HYCLATE 50 MG/1
324 CAPSULE, GELATIN COATED ORAL
Qty: 90 TABLET | Refills: 0 | Status: SHIPPED | OUTPATIENT
Start: 2022-12-09 | End: 2023-03-10

## 2022-12-09 RX ORDER — MULTIVIT WITH MINERALS/LUTEIN
250 TABLET ORAL DAILY
Qty: 90 TABLET | Refills: 0 | Status: SHIPPED | OUTPATIENT
Start: 2022-12-09 | End: 2023-03-10

## 2022-12-09 NOTE — PROGRESS NOTES
"Chief Complaint  Dizziness (C/o dizziness, lightheadedness x a couple months)    Subjective        Ramy Reyez presents to [facility]  History of Present Illness      The patient presents today for continued dizziness and lightheadedness that has been ongoing for a couple of months.    The patient reports that his symptoms are not as bad today. He states that yesterday, 12/08/2022, his symptoms were \" pretty terrible \" when he stood up and took a step or two, he felt as if he was going to pass out. He notes that he held onto a piece of furniture and the symptoms resolved in 4 to 5 seconds. He reports that he does not notice the symptoms when he is laying down in bed or when he gets out of bed or sits in a chair. The patient states that the symptoms are worse in the morning. He notes that he took a nap around 2:00 PM and was fine afterwards. He notes that on Tuesday, 12/06/2022, he had a urologist visit and his blood pressure was 116/66 mmHg. The patient reports that he had blood work done and was told that everything was normal. He states that his hemoglobin was 12.  He states that he has been forcing himself to drink more water.     The patient reports that he has been experiencing the dizziness for approximately 1.5 months. He states that he is doing better today. The patient notes that he has stopped taking lisinopril. He reports that he is taking Cardizem 3 times a day. The patient states that he does not check his blood pressure or heart rate when he feels the symptoms.    The patient notes that he has rectal bleeding when he wipes. He reports that his last Cologuard was in 2019.     The patient states that he is still sober.     He notes that he does not think that his sx are related to Ozempic.  Sx do not seem to be any worse closer to injection time.  Last injection was 12/4/22.  Continues to lose weight, but is eating. Very happy with amount of weight he has lost and does " "not want to lower dose at this time.     Objective   Vital Signs:  /80   Pulse 72   Temp 96.4 °F (35.8 °C)   Ht 193 cm (76\")   Wt (!) 145 kg (320 lb)   SpO2 100%   BMI 38.95 kg/m²   Estimated body mass index is 38.95 kg/m² as calculated from the following:    Height as of this encounter: 193 cm (76\").    Weight as of this encounter: 145 kg (320 lb).    Class 2 Severe Obesity (BMI >=35 and <=39.9). Obesity-related health conditions include the following: hypertension, diabetes mellitus and dyslipidemias. Obesity is improving with treatment. BMI is is above average; BMI management plan is completed. We discussed portion control, increasing exercise and pharmacologic options including continue Ozempic.      Physical Exam  Vitals and nursing note reviewed.   Constitutional:       General: He is not in acute distress.     Appearance: He is well-developed. He is not diaphoretic.      Comments: The patient is pale today.   HENT:      Head: Normocephalic and atraumatic.   Eyes:      General:         Right eye: No discharge.         Left eye: No discharge.      Conjunctiva/sclera: Conjunctivae normal.   Cardiovascular:      Rate and Rhythm: Normal rate and regular rhythm.      Heart sounds: Normal heart sounds.   Pulmonary:      Effort: Pulmonary effort is normal.      Breath sounds: Normal breath sounds.   Abdominal:      General: Bowel sounds are normal.      Palpations: Abdomen is soft.      Tenderness: There is no abdominal tenderness.   Musculoskeletal:         General: No deformity.      Comments: Gait smooth and steady   Skin:     General: Skin is warm and dry.   Neurological:      Mental Status: He is alert and oriented to person, place, and time.        Result Review :                Assessment and Plan   Diagnoses and all orders for this visit:    1. Orthostatic dizziness (Primary)  -     CBC & Differential  -     Comprehensive Metabolic Panel  -     Iron Profile    2. Anticoagulated by anticoagulation " treatment    3. Screen for colon cancer  -     Ambulatory Referral to Gastroenterology      Orthostatics done, and he is a little orthostatic.  This could be due to weight loss and needing med adjustment.  I have already stopped lisinopril.  He did have significant post op bleeding s/p urologic procedure. Reports he was told that his labs were normal, but I am not able to see these and will recheck today.  He is anticoagulated so this would be concern for blood loss.  Was anemic with last check.  Check Fe to make sure improving.     I have asked him to check HR and BP when he feels dizzy at home and let me know.  Any worsening will need ER.  We may need to get to cardiology for re eval of cardizem which is controlling chronic persistent A fib.     Rectal bleeding due to hemorrhoids-chronic, no worsening.  He is due CRC screen anyway, so I have referred for this.          Follow Up   No follow-ups on file.  Patient was given instructions and counseling regarding his condition or for health maintenance advice. Please see specific information pulled into the AVS if appropriate.     Transcribed from ambient dictation for HARDY Ortega by Gabriella Bragg.  12/09/22   11:01 EST    Patient or patient representative verbalized consent to the visit recording.  I have personally performed the services described in this document as transcribed by the above individual, and it is both accurate and complete.

## 2022-12-14 RX ORDER — LISINOPRIL AND HYDROCHLOROTHIAZIDE 20; 12.5 MG/1; MG/1
TABLET ORAL
Qty: 90 TABLET | Refills: 0 | OUTPATIENT
Start: 2022-12-14

## 2022-12-14 NOTE — TELEPHONE ENCOUNTER
No should not, let him know that he can follow up with her---note says she may restart it in the future

## 2022-12-16 ENCOUNTER — OFFICE VISIT (OUTPATIENT)
Dept: CARDIOLOGY | Facility: CLINIC | Age: 62
End: 2022-12-16

## 2022-12-16 VITALS
BODY MASS INDEX: 38.36 KG/M2 | HEIGHT: 76 IN | WEIGHT: 315 LBS | DIASTOLIC BLOOD PRESSURE: 78 MMHG | SYSTOLIC BLOOD PRESSURE: 136 MMHG | HEART RATE: 98 BPM

## 2022-12-16 DIAGNOSIS — I48.20 ATRIAL FIBRILLATION, CHRONIC: Primary | ICD-10-CM

## 2022-12-16 DIAGNOSIS — I10 ESSENTIAL HYPERTENSION: ICD-10-CM

## 2022-12-16 DIAGNOSIS — S30.22XA SCROTAL HEMATOMA: ICD-10-CM

## 2022-12-16 PROCEDURE — 99213 OFFICE O/P EST LOW 20 MIN: CPT

## 2022-12-19 PROCEDURE — 93000 ELECTROCARDIOGRAM COMPLETE: CPT

## 2022-12-19 NOTE — PROGRESS NOTES
Date of Office Visit: 2022  Encounter Provider: HARDY Jimenez  Place of Service: [facility] CARDIOLOGY  Patient Name: Ramy Reyez  :1960    Chief Complaint   Patient presents with   • chronic AFIB   :     HPI: Ramy Reyez is a 62 y.o. male who is a patient of Dr. Russo and Dr. Valentin's with history of atrial fibrillation which initially was paroxsymal but now persistent and likely permanent. He was treated with flecainide in the past but was discontinued and decided to just pursue rate control and anticoagulation. At that time he was heavily drinking but did go to rehab and has remained sober. He was last seen by HARDY Luna in 2017.  He was scheduled to see Dr. Valentin in 2018 but was a no-show due to he was living in [place] at that time.      He saw Dr. Valentin on 3/17/2021. Felt like he was doing well at that time. Continued to pursue rate control.  He denied any symptoms associated with his AF. He had stopped drinking alcohol. Echo showed EF of 45%, occasional edema.                 He presents today for follow up appt.     He says that overall over the past year he has done well.     He has no complaints or concerns at this time.  Denies any chest pain, palpitations, dizziness, or syncope.     In October he had complications from surgery and developed scrotal hematoma. R-ban was held and hematoma was drainined. Currently he is doing better. Back on R-ban.     He is now taking iron pills due to anemia from hematoma. Has some fatigue and occasional shortness of breath since then.      Currently on metoprolol and diltiazem for rate control.     R-ban for AC.      Past Medical History:   Diagnosis Date   • Abnormal liver function test    • Annual physical exam    • Atrial fibrillation, chronic (HCC)    • Cardiomyopathy (HCC)    • Class 3 severe obesity due to excess calories in adult (HCC)     BMI 40.0-44.9   • CPAP (continuous positive airway  pressure) dependence    • Depression    • Diabetes mellitus without complication (HCC) 05/04/2022   • Drug therapy    • Genital warts    • Health care maintenance    • Hypertension    • PAF (paroxysmal atrial fibrillation) (HCC)    • Persistent atrial fibrillation (HCC)    • Recovering alcoholic (HCC)    • Sleep apnea        Past Surgical History:   Procedure Laterality Date   • CYST REMOVAL Right     hand       Social History     Socioeconomic History   • Marital status:    Tobacco Use   • Smoking status: Never   • Smokeless tobacco: Never   • Tobacco comments:     caffeine use   Vaping Use   • Vaping Use: Never used   Substance and Sexual Activity   • Alcohol use: No     Comment: recovery alcoholic       Family History   Problem Relation Age of Onset   • Cancer Mother         uterine    • COPD Father    • Diabetes Father    • Depression Father    • Heart attack Father    • Alcohol abuse Sister    • Thyroid disease Other        Review of Systems   Constitutional: Positive for malaise/fatigue. Negative for chills and fever.   Cardiovascular: Positive for dyspnea on exertion. Negative for chest pain, leg swelling, near-syncope, orthopnea, palpitations, paroxysmal nocturnal dyspnea and syncope.   Respiratory: Negative for cough and shortness of breath.    Musculoskeletal: Negative for joint pain, joint swelling and myalgias.   Gastrointestinal: Negative for abdominal pain, diarrhea, melena, nausea and vomiting.   Genitourinary: Negative for frequency and hematuria.   Neurological: Negative for light-headedness, numbness, paresthesias and seizures.   Allergic/Immunologic: Negative.    All other systems reviewed and are negative.      No Known Allergies      Current Outpatient Medications:   •  ARIPiprazole (ABILIFY) 5 MG tablet, Take 5 mg by mouth Daily., Disp: , Rfl:   •  dilTIAZem (CARDIZEM) 60 MG tablet, TAKE 1 TABLET THREE TIMES DAILY, Disp: 270 tablet, Rfl: 0  •  escitalopram (LEXAPRO) 20 MG tablet, Take 20  "mg by mouth Daily., Disp: , Rfl:   •  ferrous gluconate (FERGON) 324 MG tablet, Take 1 tablet by mouth Daily With Breakfast. Take with vitamin C, Disp: 90 tablet, Rfl: 0  •  Hydrocortisone, Perianal, (ANUSOL-HC) 2.5 % rectal cream, Apply sparingly to hemorrhoids up to twice daily., Disp: 28 g, Rfl: 1  •  LORazepam (ATIVAN) 1 MG tablet, Take 1 mg by mouth As Needed., Disp: , Rfl: 0  •  metaxalone (SKELAXIN) 800 MG tablet, TAKE 1 TABLET BY MOUTH THREE TIMES DAILY AS NEEDED, Disp: 90 tablet, Rfl: 0  •  metFORMIN (GLUCOPHAGE) 1000 MG tablet, Take 1 tablet by mouth Daily With Breakfast., Disp: 60 tablet, Rfl: 3  •  methylphenidate (RITALIN) 20 MG tablet, , Disp: , Rfl:   •  metoprolol tartrate (LOPRESSOR) 100 MG tablet, TAKE 1 TABLET BY MOUTH TWICE DAILY, Disp: 180 tablet, Rfl: 3  •  Ozempic, 1 MG/DOSE, 4 MG/3ML solution pen-injector, INJECT 1 MG UNDER THE SKIN INTO THE APPROPRIATE AREA AS DIRECTED ONCE WEEKLY, Disp: 6 mL, Rfl: 2  •  tamsulosin (FLOMAX) 0.4 MG capsule 24 hr capsule, TAKE 1 CAPSULE BY MOUTH DAILY, Disp: 90 capsule, Rfl: 1  •  traZODone (DESYREL) 100 MG tablet, TAKE 2 TABLET BY MOUTH EVERYDAY AT BEDTIME, Disp: , Rfl: 1  •  Vibegron (Gemtesa) 75 MG tablet, Take 75 mg/day by mouth., Disp: , Rfl:   •  vitamin C (ASCORBIC ACID) 250 MG tablet, Take 1 tablet by mouth Daily. Take with iron, Disp: 90 tablet, Rfl: 0  •  Xarelto 20 MG tablet, TAKE 1 TABLET BY MOUTH EVERY DAY WITH DINNER, Disp: 90 tablet, Rfl: 3      Objective:     Vitals:    12/16/22 1358   BP: 136/78   Pulse: 98   Weight: (!) 147 kg (324 lb)   Height: 193 cm (76\")     Body mass index is 39.44 kg/m².    PHYSICAL EXAM:    Vitals Reviewed.   General Appearance: No acute distress, well developed and well nourished.   Eyes: Conjunctiva and lids: No erythema, swelling, or discharge. Sclera non-icteric.   HENT: Atraumatic, normocephalic. External eyes, ears, and nose normal.   Respiratory: No signs of respiratory distress. Respiration rhythm and depth " normal.   Clear to auscultation. No rales, crackles, rhonchi, or wheezing auscultated.   Cardiovascular:  Heart Rate and Rhythm: Normal, Heart Sounds: Normal S1 and S2. No S3 or S4 noted.  Gastrointestinal:  Abdomen soft, non-distended, non-tender.   Musculoskeletal: Normal movement of extremities  Skin: Warm and dry.   Psychiatric: Patient alert and oriented to person, place, and time. Speech and behavior appropriate. Normal mood and affect.       ECG 12 Lead    Date/Time: 12/19/2022 9:52 AM  Performed by: Duong Clifford APRN  Authorized by: Duong Clifford APRN   Comparison: compared with previous ECG   Similar to previous ECG  Rhythm: atrial fibrillation  BPM: 98                Assessment:       Diagnosis Plan   1. Atrial fibrillation, chronic        2. Essential hypertension        3. Scrotal hematoma               Plan:   1. Chronic AF--- rate is well controlled. He has no complaints or concerns about his AF. Now back on R-ban for AC.    2. HTN-- well controlled.     3. Scrotal hematoma-- complication from recent surgery. He became anemic requiring iron infusion. Currently stable. Now back on R-ban with no bleeding issues.         He will follow up in one year or sooner if he has issues.             As always, it has been a pleasure to participate in your patient's care.      Sincerely,         HARDY Jimenez

## 2023-01-25 RX ORDER — CYCLOBENZAPRINE HCL 10 MG
10 TABLET ORAL 3 TIMES DAILY PRN
Qty: 30 TABLET | Refills: 0 | Status: SHIPPED | OUTPATIENT
Start: 2023-01-25 | End: 2023-03-13

## 2023-02-16 ENCOUNTER — TELEPHONE (OUTPATIENT)
Dept: FAMILY MEDICINE CLINIC | Facility: CLINIC | Age: 63
End: 2023-02-16
Payer: COMMERCIAL

## 2023-02-16 RX ORDER — SEMAGLUTIDE 1.34 MG/ML
1 INJECTION, SOLUTION SUBCUTANEOUS WEEKLY
Qty: 6 ML | Refills: 2 | Status: SHIPPED | OUTPATIENT
Start: 2023-02-16 | End: 2023-03-01 | Stop reason: DRUGHIGH

## 2023-02-25 DIAGNOSIS — R35.0 URINARY FREQUENCY: ICD-10-CM

## 2023-02-27 RX ORDER — TAMSULOSIN HYDROCHLORIDE 0.4 MG/1
1 CAPSULE ORAL DAILY
Qty: 90 CAPSULE | Refills: 1 | Status: SHIPPED | OUTPATIENT
Start: 2023-02-27

## 2023-02-27 NOTE — TELEPHONE ENCOUNTER
Rx Refill Note  Requested Prescriptions     Pending Prescriptions Disp Refills   • tamsulosin (FLOMAX) 0.4 MG capsule 24 hr capsule [Pharmacy Med Name: TAMSULOSIN 0.4MG CAPSULES] 90 capsule 1     Sig: TAKE 1 CAPSULE BY MOUTH DAILY      Last office visit with prescribing clinician: Visit date not found   Last telemedicine visit with prescribing clinician: 3/1/2023   Next office visit with prescribing clinician: Visit date not found                         Would you like a call back once the refill request has been completed: [] Yes [] No    If the office needs to give you a call back, can they leave a voicemail: [] Yes [] No    Leslye Hamilton, PCT  02/27/23, 08:20 EST

## 2023-03-01 ENCOUNTER — OFFICE VISIT (OUTPATIENT)
Dept: FAMILY MEDICINE CLINIC | Facility: CLINIC | Age: 63
End: 2023-03-01
Payer: COMMERCIAL

## 2023-03-01 ENCOUNTER — PREP FOR SURGERY (OUTPATIENT)
Dept: SURGERY | Facility: SURGERY CENTER | Age: 63
End: 2023-03-01
Payer: COMMERCIAL

## 2023-03-01 VITALS
DIASTOLIC BLOOD PRESSURE: 89 MMHG | TEMPERATURE: 97.1 F | HEART RATE: 77 BPM | WEIGHT: 315 LBS | OXYGEN SATURATION: 98 % | HEIGHT: 76 IN | SYSTOLIC BLOOD PRESSURE: 127 MMHG | BODY MASS INDEX: 38.36 KG/M2

## 2023-03-01 DIAGNOSIS — E11.9 DIABETES MELLITUS WITHOUT COMPLICATION: ICD-10-CM

## 2023-03-01 DIAGNOSIS — E78.2 MIXED HYPERLIPIDEMIA: ICD-10-CM

## 2023-03-01 DIAGNOSIS — D62 ACUTE BLOOD LOSS ANEMIA: ICD-10-CM

## 2023-03-01 DIAGNOSIS — I10 ESSENTIAL HYPERTENSION: ICD-10-CM

## 2023-03-01 DIAGNOSIS — I87.2 STASIS DERMATITIS OF BOTH LEGS: ICD-10-CM

## 2023-03-01 DIAGNOSIS — M79.671 ACUTE FOOT PAIN, RIGHT: Primary | ICD-10-CM

## 2023-03-01 DIAGNOSIS — Z12.11 ENCOUNTER FOR SCREENING FOR MALIGNANT NEOPLASM OF COLON: Primary | ICD-10-CM

## 2023-03-01 DIAGNOSIS — Z86.010 HISTORY OF COLON POLYPS: ICD-10-CM

## 2023-03-01 DIAGNOSIS — Z79.01 ANTICOAGULATED BY ANTICOAGULATION TREATMENT: ICD-10-CM

## 2023-03-01 PROCEDURE — 99214 OFFICE O/P EST MOD 30 MIN: CPT | Performed by: NURSE PRACTITIONER

## 2023-03-01 RX ORDER — HYDROCODONE BITARTRATE AND ACETAMINOPHEN 5; 325 MG/1; MG/1
TABLET ORAL
COMMUNITY
Start: 2023-02-27

## 2023-03-01 RX ORDER — SEMAGLUTIDE 2.68 MG/ML
2 INJECTION, SOLUTION SUBCUTANEOUS WEEKLY
Qty: 3 ML | Refills: 1 | Status: SHIPPED | OUTPATIENT
Start: 2023-03-01

## 2023-03-01 RX ORDER — TADALAFIL 5 MG/1
TABLET ORAL
COMMUNITY
Start: 2022-12-20

## 2023-03-01 RX ORDER — AMOXICILLIN 500 MG/1
TABLET, FILM COATED ORAL
COMMUNITY
Start: 2023-02-27

## 2023-03-01 NOTE — PROGRESS NOTES
Chief Complaint  Diabetes (F/u dm) and Leg Swelling (C/o R lower leg swelling, R foot soreness, x 1-2wks)    Subjective        Ramy Strange presents to [facility]  History of Present Illness     RAMY STRANGE is a 62-year-old male who presents today for a follow-up on labs. He has consented to BRIANNA.    He is doing well on Ozempic 1 mg weekly. He denies any side effects. He lost some weight, but over the holidays, he gained a few pounds back. He has been walking more at work. He is interested in increasing it to 2 MG.       His left leg was itchy for a while, and he scratched it. Now, it seems to be retaining a lot of water. When he walks, he has a lot of pain. The pain is more in his foot. He denies injury.      He saw vascular disease 5 years ago. He has started wearing compression socks every day. The compression socks do help with the swelling, but it does not help the pain. His foot does not hurt when he is sitting.  When he walks, it is a 5 on a scale of 1 to 10. It causes him to limp. This has been going on for a few days. He denies any injury. He just got up and started to walk down the steps. It was the first thing in the morning. It is more in his foot, not in his ankle or toe. His leg has been more swollen than normal.  Swelling is reduced from what it was previously.  He has had cellulitis in the past. He occasionally uses Aquaphor.    He has a low libido. His urologist has checked his testosterone, and he was told it was fine. He is taking Cialis every day for his prostate and ED.     He denies chest pain or heart palpitations. He is having normal bowel movements. He is taking amoxicillin for a tooth abscess. He just started taking it yesterday. He had a toothache that was terrible. He has never had a toothache before. His whole head was aching.    He thinks the iron is working because he has not had lightheadedness since he started taking the iron supplement. He is taking  "Xarelto.    He has hemorrhoids.  He has some intermittent bright red blood when wiping.  No changes in this and he does not see melena.      Objective   Vital Signs:  /89   Pulse 77   Temp 97.1 °F (36.2 °C)   Ht 193 cm (76\")   Wt (!) 152 kg (334 lb)   SpO2 98%   BMI 40.66 kg/m²   Estimated body mass index is 40.66 kg/m² as calculated from the following:    Height as of this encounter: 193 cm (76\").    Weight as of this encounter: 152 kg (334 lb).             Physical Exam  Vitals and nursing note reviewed.   Constitutional:       Appearance: He is well-developed. He is obese. He is not ill-appearing.   HENT:      Head: Normocephalic and atraumatic.      Right Ear: External ear normal.      Left Ear: External ear normal.   Cardiovascular:      Rate and Rhythm: Normal rate. Rhythm irregularly irregular.      Heart sounds: Normal heart sounds.   Pulmonary:      Effort: Pulmonary effort is normal. No respiratory distress.      Breath sounds: Normal breath sounds.   Abdominal:      Palpations: Abdomen is soft.      Tenderness: There is no abdominal tenderness. There is no guarding.   Musculoskeletal:         General: Normal range of motion.      Cervical back: Normal range of motion and neck supple.      Left lower leg: Edema present.      Comments: Left lower extremity with venous stasis dermatitis, spider and varicose veins   no signs or symptoms of cellulitis  Right lower extremity with minimal swelling and trace stasis dermatitis   Lymphadenopathy:      Cervical: No cervical adenopathy.   Skin:     General: Skin is warm and dry.   Neurological:      General: No focal deficit present.      Mental Status: He is alert and oriented to person, place, and time.   Psychiatric:         Mood and Affect: Mood normal.         Behavior: Behavior normal.        Result Review :                   Assessment and Plan   Diagnoses and all orders for this visit:    1. Acute foot pain, right (Primary)  -     Uric acid    2. " Stasis dermatitis of both legs    3. Diabetes mellitus without complication (HCC)  -     Semaglutide, 2 MG/DOSE, (Ozempic, 2 MG/DOSE,) 8 MG/3ML solution pen-injector; Inject 2 mg under the skin into the appropriate area as directed 1 (One) Time Per Week.  Dispense: 3 mL; Refill: 1  -     Hemoglobin A1c    4. Essential hypertension  -     CBC & Differential  -     Basic Metabolic Panel    5. Mixed hyperlipidemia  -     Lipid Panel With LDL / HDL Ratio    6. Acute blood loss anemia    7. Anticoagulated by anticoagulation treatment            Type 2 diabetes mellitus: A1C has normalized with Ozempic   We will increase his Ozempic to 2 mg to see if we can improve his weight.   We will obtain labs today.   Basic Metabolic Panel   CBC & Differential   Hemoglobin A1c   Lipid Panel With LDL / HDL Ratio   Uric acid    Left leg edema   He will continue to elevate his legs as much as possible.   He will continue to wear his compression stockings.  Management of stasis dermatitis discussed.   He will continue to monitor his symptoms.   If his symptoms do not improve, we will refer him to a vascular specialist.  I am not sure why he is having the foot pain but we both agree that an x-ray probably is not helpful since there is no injury and a normal exam.  May be due to swelling.    Anemia is improving and we will recheck today.  He is no longer symptomatic.    Hypertension: Pretty well controlled typically.  It is a little bit high today.  Recommend home monitoring.  We will continue current medications.     Low libido   He will discuss this with his psychiatrist-May be due to at least in part medications.         Follow Up   Return in about 3 months (around 6/1/2023).  Patient was given instructions and counseling regarding his condition or for health maintenance advice. Please see specific information pulled into the AVS if appropriate.             Transcribed from ambient dictation for HARDY Ortega by Martha  Winston.  03/01/23   11:51 EST    Patient or patient representative verbalized consent to the visit recording.  I have personally performed the services described in this document as transcribed by the above individual, and it is both accurate and complete.

## 2023-03-02 PROBLEM — Z86.0100 HISTORY OF COLON POLYPS: Status: ACTIVE | Noted: 2023-03-02

## 2023-03-02 PROBLEM — E78.2 MIXED HYPERLIPIDEMIA: Status: ACTIVE | Noted: 2023-03-02

## 2023-03-02 PROBLEM — Z86.010 HISTORY OF COLON POLYPS: Status: ACTIVE | Noted: 2023-03-02

## 2023-03-02 PROBLEM — I87.2 STASIS DERMATITIS OF BOTH LEGS: Status: ACTIVE | Noted: 2023-03-02

## 2023-03-02 PROBLEM — Z12.11 ENCOUNTER FOR SCREENING FOR MALIGNANT NEOPLASM OF COLON: Status: ACTIVE | Noted: 2023-03-02

## 2023-03-02 LAB
BASOPHILS # BLD AUTO: 0.05 10*3/MM3 (ref 0–0.2)
BASOPHILS NFR BLD AUTO: 0.8 % (ref 0–1.5)
BUN SERPL-MCNC: 11 MG/DL (ref 8–23)
BUN/CREAT SERPL: 10.4 (ref 7–25)
CALCIUM SERPL-MCNC: 9.9 MG/DL (ref 8.6–10.5)
CHLORIDE SERPL-SCNC: 101 MMOL/L (ref 98–107)
CHOLEST SERPL-MCNC: 117 MG/DL (ref 0–200)
CO2 SERPL-SCNC: 28 MMOL/L (ref 22–29)
CREAT SERPL-MCNC: 1.06 MG/DL (ref 0.76–1.27)
EGFRCR SERPLBLD CKD-EPI 2021: 78.9 ML/MIN/1.73
EOSINOPHIL # BLD AUTO: 0.18 10*3/MM3 (ref 0–0.4)
EOSINOPHIL NFR BLD AUTO: 2.9 % (ref 0.3–6.2)
ERYTHROCYTE [DISTWIDTH] IN BLOOD BY AUTOMATED COUNT: 16.7 % (ref 12.3–15.4)
GLUCOSE SERPL-MCNC: 111 MG/DL (ref 65–99)
HBA1C MFR BLD: 5.6 % (ref 4.8–5.6)
HCT VFR BLD AUTO: 41.8 % (ref 37.5–51)
HDLC SERPL-MCNC: 37 MG/DL (ref 40–60)
HGB BLD-MCNC: 12.8 G/DL (ref 13–17.7)
IMM GRANULOCYTES # BLD AUTO: 0.02 10*3/MM3 (ref 0–0.05)
IMM GRANULOCYTES NFR BLD AUTO: 0.3 % (ref 0–0.5)
LDLC SERPL CALC-MCNC: 62 MG/DL (ref 0–100)
LDLC/HDLC SERPL: 1.67 {RATIO}
LYMPHOCYTES # BLD AUTO: 1.31 10*3/MM3 (ref 0.7–3.1)
LYMPHOCYTES NFR BLD AUTO: 20.9 % (ref 19.6–45.3)
MCH RBC QN AUTO: 24.2 PG (ref 26.6–33)
MCHC RBC AUTO-ENTMCNC: 30.6 G/DL (ref 31.5–35.7)
MCV RBC AUTO: 79.2 FL (ref 79–97)
MONOCYTES # BLD AUTO: 0.72 10*3/MM3 (ref 0.1–0.9)
MONOCYTES NFR BLD AUTO: 11.5 % (ref 5–12)
NEUTROPHILS # BLD AUTO: 4 10*3/MM3 (ref 1.7–7)
NEUTROPHILS NFR BLD AUTO: 63.6 % (ref 42.7–76)
NRBC BLD AUTO-RTO: 0 /100 WBC (ref 0–0.2)
PLATELET # BLD AUTO: 221 10*3/MM3 (ref 140–450)
POTASSIUM SERPL-SCNC: 4.2 MMOL/L (ref 3.5–5.2)
RBC # BLD AUTO: 5.28 10*6/MM3 (ref 4.14–5.8)
SODIUM SERPL-SCNC: 139 MMOL/L (ref 136–145)
TRIGL SERPL-MCNC: 91 MG/DL (ref 0–150)
URATE SERPL-MCNC: 6 MG/DL (ref 3.4–7)
VLDLC SERPL CALC-MCNC: 18 MG/DL (ref 5–40)
WBC # BLD AUTO: 6.28 10*3/MM3 (ref 3.4–10.8)

## 2023-03-02 RX ORDER — SODIUM CHLORIDE, SODIUM LACTATE, POTASSIUM CHLORIDE, CALCIUM CHLORIDE 600; 310; 30; 20 MG/100ML; MG/100ML; MG/100ML; MG/100ML
30 INJECTION, SOLUTION INTRAVENOUS CONTINUOUS PRN
OUTPATIENT
Start: 2023-03-02

## 2023-03-03 ENCOUNTER — TELEPHONE (OUTPATIENT)
Dept: CARDIOLOGY | Facility: CLINIC | Age: 63
End: 2023-03-03
Payer: COMMERCIAL

## 2023-03-03 NOTE — TELEPHONE ENCOUNTER
Received a fax from [facility] stating pt is scheduled 4/26/23 to have a colonoscopy. They are asking if pt can move forward and if so how long can he hold his rivaroxaban prior to. Pt has NO history of stroke or valve replacement...Melvi

## 2023-03-08 DIAGNOSIS — D50.9 IRON DEFICIENCY ANEMIA, UNSPECIFIED IRON DEFICIENCY ANEMIA TYPE: ICD-10-CM

## 2023-03-10 RX ORDER — DOXYCYCLINE HYCLATE 50 MG/1
CAPSULE, GELATIN COATED ORAL
Qty: 90 TABLET | Refills: 0 | Status: SHIPPED | OUTPATIENT
Start: 2023-03-10

## 2023-03-10 RX ORDER — MULTIVIT WITH MINERALS/LUTEIN
TABLET ORAL
Qty: 90 TABLET | Refills: 0 | Status: SHIPPED | OUTPATIENT
Start: 2023-03-10

## 2023-03-13 RX ORDER — CYCLOBENZAPRINE HCL 10 MG
TABLET ORAL
Qty: 30 TABLET | Refills: 0 | Status: SHIPPED | OUTPATIENT
Start: 2023-03-13

## 2023-03-31 RX ORDER — DILTIAZEM HYDROCHLORIDE 60 MG/1
TABLET, FILM COATED ORAL
Qty: 270 TABLET | Refills: 2 | Status: SHIPPED | OUTPATIENT
Start: 2023-03-31

## 2023-04-25 ENCOUNTER — ANESTHESIA EVENT (OUTPATIENT)
Dept: PERIOP | Facility: HOSPITAL | Age: 63
End: 2023-04-25
Payer: COMMERCIAL

## 2023-04-25 RX ORDER — METAXALONE 800 MG/1
800 TABLET ORAL AS NEEDED
COMMUNITY

## 2023-04-26 ENCOUNTER — ANESTHESIA (OUTPATIENT)
Dept: PERIOP | Facility: HOSPITAL | Age: 63
End: 2023-04-26
Payer: COMMERCIAL

## 2023-04-26 ENCOUNTER — HOSPITAL ENCOUNTER (OUTPATIENT)
Facility: HOSPITAL | Age: 63
Setting detail: HOSPITAL OUTPATIENT SURGERY
Discharge: HOME | End: 2023-04-26
Attending: INTERNAL MEDICINE | Admitting: INTERNAL MEDICINE
Payer: COMMERCIAL

## 2023-04-26 VITALS
OXYGEN SATURATION: 97 % | BODY MASS INDEX: 39.07 KG/M2 | RESPIRATION RATE: 10 BRPM | WEIGHT: 315 LBS | TEMPERATURE: 97.5 F | DIASTOLIC BLOOD PRESSURE: 64 MMHG | SYSTOLIC BLOOD PRESSURE: 116 MMHG | HEART RATE: 81 BPM

## 2023-04-26 DIAGNOSIS — Z86.010 HISTORY OF COLON POLYPS: ICD-10-CM

## 2023-04-26 DIAGNOSIS — Z12.11 ENCOUNTER FOR SCREENING FOR MALIGNANT NEOPLASM OF COLON: ICD-10-CM

## 2023-04-26 LAB — GLUCOSE BLDC GLUCOMTR-MCNC: 101 MG/DL (ref 70–130)

## 2023-04-26 PROCEDURE — 88305 TISSUE EXAM BY PATHOLOGIST: CPT | Performed by: INTERNAL MEDICINE

## 2023-04-26 PROCEDURE — 82962 GLUCOSE BLOOD TEST: CPT

## 2023-04-26 PROCEDURE — 25010000002 PROPOFOL 200 MG/20ML EMULSION: Performed by: NURSE ANESTHETIST, CERTIFIED REGISTERED

## 2023-04-26 RX ORDER — SODIUM CHLORIDE, SODIUM LACTATE, POTASSIUM CHLORIDE, CALCIUM CHLORIDE 600; 310; 30; 20 MG/100ML; MG/100ML; MG/100ML; MG/100ML
9 INJECTION, SOLUTION INTRAVENOUS CONTINUOUS
Status: DISCONTINUED | OUTPATIENT
Start: 2023-04-26 | End: 2023-04-26 | Stop reason: HOSPADM

## 2023-04-26 RX ORDER — SODIUM CHLORIDE, SODIUM LACTATE, POTASSIUM CHLORIDE, CALCIUM CHLORIDE 600; 310; 30; 20 MG/100ML; MG/100ML; MG/100ML; MG/100ML
30 INJECTION, SOLUTION INTRAVENOUS CONTINUOUS PRN
Status: DISCONTINUED | OUTPATIENT
Start: 2023-04-26 | End: 2023-04-26 | Stop reason: HOSPADM

## 2023-04-26 RX ORDER — SODIUM CHLORIDE 9 MG/ML
40 INJECTION, SOLUTION INTRAVENOUS AS NEEDED
Status: DISCONTINUED | OUTPATIENT
Start: 2023-04-26 | End: 2023-04-26 | Stop reason: HOSPADM

## 2023-04-26 RX ORDER — PROPOFOL 10 MG/ML
INJECTION, EMULSION INTRAVENOUS AS NEEDED
Status: DISCONTINUED | OUTPATIENT
Start: 2023-04-26 | End: 2023-04-26 | Stop reason: SURG

## 2023-04-26 RX ORDER — LIDOCAINE HYDROCHLORIDE 10 MG/ML
0.5 INJECTION, SOLUTION EPIDURAL; INFILTRATION; INTRACAUDAL; PERINEURAL ONCE AS NEEDED
Status: DISCONTINUED | OUTPATIENT
Start: 2023-04-26 | End: 2023-04-26 | Stop reason: HOSPADM

## 2023-04-26 RX ORDER — SODIUM CHLORIDE, SODIUM LACTATE, POTASSIUM CHLORIDE, CALCIUM CHLORIDE 600; 310; 30; 20 MG/100ML; MG/100ML; MG/100ML; MG/100ML
100 INJECTION, SOLUTION INTRAVENOUS CONTINUOUS
Status: DISCONTINUED | OUTPATIENT
Start: 2023-04-26 | End: 2023-04-26 | Stop reason: HOSPADM

## 2023-04-26 RX ORDER — ONDANSETRON 2 MG/ML
4 INJECTION INTRAMUSCULAR; INTRAVENOUS ONCE AS NEEDED
Status: DISCONTINUED | OUTPATIENT
Start: 2023-04-26 | End: 2023-04-26 | Stop reason: HOSPADM

## 2023-04-26 RX ORDER — SODIUM CHLORIDE 0.9 % (FLUSH) 0.9 %
10 SYRINGE (ML) INJECTION EVERY 12 HOURS SCHEDULED
Status: DISCONTINUED | OUTPATIENT
Start: 2023-04-26 | End: 2023-04-26 | Stop reason: HOSPADM

## 2023-04-26 RX ORDER — SODIUM CHLORIDE 0.9 % (FLUSH) 0.9 %
10 SYRINGE (ML) INJECTION AS NEEDED
Status: DISCONTINUED | OUTPATIENT
Start: 2023-04-26 | End: 2023-04-26 | Stop reason: HOSPADM

## 2023-04-26 RX ORDER — DIPHENHYDRAMINE HYDROCHLORIDE 50 MG/ML
12.5 INJECTION INTRAMUSCULAR; INTRAVENOUS
Status: DISCONTINUED | OUTPATIENT
Start: 2023-04-26 | End: 2023-04-26 | Stop reason: HOSPADM

## 2023-04-26 RX ADMIN — SODIUM CHLORIDE, POTASSIUM CHLORIDE, SODIUM LACTATE AND CALCIUM CHLORIDE 9 ML/HR: 600; 310; 30; 20 INJECTION, SOLUTION INTRAVENOUS at 07:57

## 2023-04-26 RX ADMIN — PROPOFOL INJECTABLE EMULSION 100 MG: 10 INJECTION, EMULSION INTRAVENOUS at 09:40

## 2023-04-26 RX ADMIN — PROPOFOL INJECTABLE EMULSION 100 MG: 10 INJECTION, EMULSION INTRAVENOUS at 09:23

## 2023-04-26 RX ADMIN — PROPOFOL INJECTABLE EMULSION 100 MG: 10 INJECTION, EMULSION INTRAVENOUS at 09:35

## 2023-04-26 RX ADMIN — PROPOFOL INJECTABLE EMULSION 100 MG: 10 INJECTION, EMULSION INTRAVENOUS at 09:26

## 2023-04-26 RX ADMIN — PROPOFOL INJECTABLE EMULSION 100 MG: 10 INJECTION, EMULSION INTRAVENOUS at 09:30

## 2023-04-26 NOTE — ANESTHESIA PREPROCEDURE EVALUATION
Anesthesia Evaluation     Patient summary reviewed and Nursing notes reviewed   no history of anesthetic complications:  NPO Solid Status: > 8 hours  NPO Liquid Status: > 8 hours           Airway   Mallampati: II  TM distance: >3 FB  Neck ROM: full  No difficulty expected  Dental - normal exam     Pulmonary - normal exam    breath sounds clear to auscultation  (+) sleep apnea on CPAP,   Cardiovascular - normal exam  Exercise tolerance: good (4-7 METS)    ECG reviewed  PT is on anticoagulation therapy  Patient on routine beta blocker and Beta blocker not taken-may be given intraoperatively  Rhythm: regular  Rate: normal    (+) hypertension well controlled 2 medications or greater, dysrhythmias Atrial Fib, hyperlipidemia,     ROS comment: Last Xarelto dose Friday 4/21    Duong Clifford APRN     12/19/2022  9:53 AM     ECG 12 Lead     Date/Time: 12/19/2022 9:52 AM   Performed by: Duong Clifford APRN   Authorized by: Duong Clifford APRN   Comparison: compared with previous ECG   Similar to previous ECG   Rhythm: atrial fibrillation   BPM: 98     4/1/21: Echo  ? Left ventricular ejection fraction appears to be 41 - 45%. Left ventricular systolic function is moderately decreased. The left ventricular cavity is severely dilated. Left ventricular wall thickness is consistent with moderate concentric hypertrophy. There is left ventricular global hypokinesis noted. Left ventricular diastolic function was indeterminate. No evidence of left ventricular thrombus or mass present.  ? The right ventricular cavity is moderately dilated. Moderately reduced right ventricular systolic function noted.  ? The left atrial cavity is moderate to severely dilated.  ? Estimated right ventricular systolic pressure from tricuspid regurgitation is mildly elevated (35-45 mmHg).  ? Mild dilation of the aortic root is present. Mild dilation of the ascending aorta is present. The inferior vena cava is dilated. Partial IVC inspiratory collapse of  less than 50% noted. The main pulmonary artery is mildly dilated.      Neuro/Psych  (+) psychiatric history Anxiety and Depression,    GI/Hepatic/Renal/Endo    (+) obesity, morbid obesity,  diabetes mellitus type 2 well controlled,     Musculoskeletal     (+) back pain,   Abdominal   (+) obese,     Abdomen: soft.   Substance History - negative use     OB/GYN          Other   arthritis,                      Anesthesia Plan    ASA 3     MAC     intravenous induction     Anesthetic plan, risks, benefits, and alternatives have been provided, discussed and informed consent has been obtained with: patient.  Pre-procedure education provided  Use of blood products discussed with patient  Consented to blood products.   Plan discussed with CRNA.        CODE STATUS:

## 2023-04-26 NOTE — ANESTHESIA POSTPROCEDURE EVALUATION
Patient: Ramy Reyez    Procedure Summary     Date: 04/26/23 Room / Location: [facility] ENDOSCOPY 2 /  [facility]    Anesthesia Start: 0918 Anesthesia Stop: 0945    Procedure: COLONOSCOPY; POLYPECTOMY Diagnosis:       Encounter for screening for malignant neoplasm of colon      History of colon polyps      (Encounter for screening for malignant neoplasm of colon [Z12.11])      (History of colon polyps [Z86.010])    Surgeons: Kalpesh Kendall MD Provider: Ottoniel Nogueira CRNA    Anesthesia Type: MAC ASA Status: 3          Anesthesia Type: MAC    Vitals  Vitals Value Taken Time   BP     Temp 97.5 °F (36.4 °C) 04/26/23 0946   Pulse     Resp     SpO2             Post Anesthesia Care and Evaluation    Patient location during evaluation: PHASE II  Patient participation: complete - patient participated  Level of consciousness: awake  Pain score: 0  Pain management: adequate    Airway patency: patent  Anesthetic complications: No anesthetic complications  PONV Status: none  Cardiovascular status: acceptable  Respiratory status: acceptable  Hydration status: acceptable

## 2023-04-26 NOTE — H&P
No chief complaint on file.      HPI  Patient today for screening colonoscopy.         Problem List:    Patient Active Problem List   Diagnosis   • Sleep apnea   • Varicose veins of lower extremities   • Vitamin D deficiency   • Atrial fibrillation, chronic   • Essential hypertension   • Class 3 severe obesity due to excess calories in adult   • Depression   • Hyperglycemia   • Anxiety   • Anticoagulated by anticoagulation treatment   • Chronic bilateral low back pain without sciatica   • Diabetes mellitus without complication   • Obesity, Class III, BMI 40-49.9 (morbid obesity)   • Hemorrhoids   • Scrotal hematoma   • Encounter for screening for malignant neoplasm of colon   • History of colon polyps   • Stasis dermatitis of both legs   • Mixed hyperlipidemia       Medical History:    Past Medical History:   Diagnosis Date   • Abnormal liver function test    • Annual physical exam    • Atrial fibrillation, chronic    • Cardiomyopathy    • Class 3 severe obesity due to excess calories in adult     BMI 40.0-44.9   • CPAP (continuous positive airway pressure) dependence    • Depression    • Diabetes mellitus without complication 05/04/2022   • Drug therapy    • Genital warts    • Health care maintenance    • Hypertension    • PAF (paroxysmal atrial fibrillation)    • Persistent atrial fibrillation    • Recovering alcoholic    • Sleep apnea     cpap        Social History:    Social History     Socioeconomic History   • Marital status:    Tobacco Use   • Smoking status: Never   • Smokeless tobacco: Never   • Tobacco comments:     caffeine use   Vaping Use   • Vaping Use: Never used   Substance and Sexual Activity   • Alcohol use: No     Comment: recovery alcoholic   • Drug use: Never   • Sexual activity: Defer       Family History:   Family History   Problem Relation Age of Onset   • Cancer Mother         uterine    • COPD Father    • Diabetes Father    • Depression Father    • Heart attack Father    • Alcohol  abuse Sister    • Thyroid disease Other        Surgical History:   Past Surgical History:   Procedure Laterality Date   • COLONOSCOPY     • CYST REMOVAL Right     hand         Current Facility-Administered Medications:   •  lactated ringers infusion, 9 mL/hr, Intravenous, Continuous, Yvon Sanderson CRNA, Last Rate: 9 mL/hr at 04/26/23 0757, 9 mL/hr at 04/26/23 0757  •  lactated ringers infusion, 30 mL/hr, Intravenous, Continuous PRN, Kalpesh Kendall MD  •  lidocaine PF 1% (XYLOCAINE) injection 0.5 mL, 0.5 mL, Injection, Once PRN, Yvon Sanderson CRNA  •  sodium chloride 0.9 % flush 10 mL, 10 mL, Intravenous, PRN, Yvon Sanderson CRNA  •  sodium chloride 0.9 % flush 10 mL, 10 mL, Intravenous, Q12H, Yvon Sanderson CRNA  •  sodium chloride 0.9 % infusion 40 mL, 40 mL, Intravenous, PRN, Yvon Sanderson CRNA    Allergies: No Known Allergies       Review of Systems   Pertinent items are noted in HPI      The following portions of the patient's history were reviewed by me and updated as appropriate: review of systems, allergies, current medications, past family history, past medical history, past social history, past surgical history and problem list.    /64 (BP Location: Left arm, Patient Position: Lying)   Pulse 81   Temp 98 °F (36.7 °C) (Oral)   Resp 10   Wt (!) 146 kg (321 lb)   SpO2 97%   BMI 39.07 kg/m²     PHYSICAL EXAM:    CONSTITUTIONAL:  today's vital signs reviewed by me  GASTROINTESTINAL: abdomen is soft nontender nondistended with normal active bowel sounds, no masses are appreciated    Debilities: None  Emotional Behavior: Appropriate    Assessment/ Plan  We will proceed today with screening colonoscopy.    Risks and benefits as well as alternatives to endoscopic evaluation were explained to the patient and they voiced understanding and wish to proceed.  These risks include but are not limited to the risk of bleeding, perforation, adverse reaction to sedation, and  missed lesions.  The patient was given the opportunity to ask questions prior to the endoscopic procedure.

## 2023-04-27 LAB
LAB AP CASE REPORT: NORMAL
LAB AP CLINICAL INFORMATION: NORMAL
PATH REPORT.FINAL DX SPEC: NORMAL
PATH REPORT.GROSS SPEC: NORMAL

## 2023-05-01 ENCOUNTER — TELEPHONE (OUTPATIENT)
Dept: CARDIOLOGY | Facility: CLINIC | Age: 63
End: 2023-05-01
Payer: COMMERCIAL

## 2023-05-01 NOTE — TELEPHONE ENCOUNTER
This request has been approved using information available on the patient's profile. CaseId:[id number];Status:Approved;Review Type:Prior Auth;Coverage Start Date:04/01/2023;Coverage End Date:04/30/2024;

## 2023-05-02 DIAGNOSIS — E11.9 DIABETES MELLITUS WITHOUT COMPLICATION: ICD-10-CM

## 2023-05-02 RX ORDER — SEMAGLUTIDE 2.68 MG/ML
INJECTION, SOLUTION SUBCUTANEOUS
Qty: 3 ML | Refills: 0 | Status: SHIPPED | OUTPATIENT
Start: 2023-05-02

## 2023-05-02 NOTE — TELEPHONE ENCOUNTER
Rx Refill Note  Requested Prescriptions     Pending Prescriptions Disp Refills   • Ozempic, 2 MG/DOSE, 8 MG/3ML solution pen-injector [Pharmacy Med Name: OZEMPIC 2MG PER DOSE (1X8MG PEN)] 3 mL 1     Sig: INJECT 1 MG UNDER THE SKIN EVERY WEEK      Last office visit with prescribing clinician: 3/1/2023   Last telemedicine visit with prescribing clinician: 6/6/2023   Next office visit with prescribing clinician: 6/6/2023                         Would you like a call back once the refill request has been completed: [] Yes [] No    If the office needs to give you a call back, can they leave a voicemail: [] Yes [] No    Shakira Street MA  05/02/23, 17:12 EDT

## 2023-05-08 RX ORDER — CYCLOBENZAPRINE HCL 10 MG
TABLET ORAL
Qty: 30 TABLET | Refills: 0 | Status: SHIPPED | OUTPATIENT
Start: 2023-05-08

## 2023-05-08 RX ORDER — METAXALONE 800 MG/1
TABLET ORAL
Qty: 90 TABLET | Refills: 0 | Status: SHIPPED | OUTPATIENT
Start: 2023-05-08

## 2023-05-08 NOTE — TELEPHONE ENCOUNTER
Rx Refill Note  Requested Prescriptions     Pending Prescriptions Disp Refills   • cyclobenzaprine (FLEXERIL) 10 MG tablet [Pharmacy Med Name: CYCLOBENZAPRINE 10MG TABLETS] 30 tablet 0     Sig: TAKE 1 TABLET BY MOUTH THREE TIMES DAILY AS NEEDED FOR MUSCLE SPASMS   • metaxalone (SKELAXIN) 800 MG tablet [Pharmacy Med Name: METAXALONE 800MG TABLETS] 90 tablet      Sig: TAKE 1 TABLET BY MOUTH THREE TIMES DAILY AS NEEDED      Last office visit with prescribing clinician: 3/1/2023   Last telemedicine visit with prescribing clinician: 5/8/2023   Next office visit with prescribing clinician: 5/8/2023                         Would you like a call back once the refill request has been completed: [] Yes [] No    If the office needs to give you a call back, can they leave a voicemail: [] Yes [] No    Shakira Street MA  05/08/23, 17:02 EDT

## 2023-06-06 ENCOUNTER — OFFICE VISIT (OUTPATIENT)
Dept: FAMILY MEDICINE CLINIC | Facility: CLINIC | Age: 63
End: 2023-06-06
Payer: COMMERCIAL

## 2023-06-06 VITALS
HEART RATE: 91 BPM | DIASTOLIC BLOOD PRESSURE: 83 MMHG | BODY MASS INDEX: 38.36 KG/M2 | HEIGHT: 76 IN | WEIGHT: 315 LBS | SYSTOLIC BLOOD PRESSURE: 128 MMHG | TEMPERATURE: 95.9 F | OXYGEN SATURATION: 96 %

## 2023-06-06 DIAGNOSIS — E11.9 DIABETES MELLITUS WITHOUT COMPLICATION: Primary | Chronic | ICD-10-CM

## 2023-06-06 DIAGNOSIS — D50.9 IRON DEFICIENCY ANEMIA, UNSPECIFIED IRON DEFICIENCY ANEMIA TYPE: ICD-10-CM

## 2023-06-06 DIAGNOSIS — G89.29 CHRONIC BILATERAL LOW BACK PAIN WITHOUT SCIATICA: Chronic | ICD-10-CM

## 2023-06-06 DIAGNOSIS — Z79.01 ANTICOAGULATED BY ANTICOAGULATION TREATMENT: ICD-10-CM

## 2023-06-06 DIAGNOSIS — M54.50 CHRONIC BILATERAL LOW BACK PAIN WITHOUT SCIATICA: Chronic | ICD-10-CM

## 2023-06-06 DIAGNOSIS — E66.9 OBESITY (BMI 30-39.9): ICD-10-CM

## 2023-06-06 RX ORDER — SEMAGLUTIDE 2.68 MG/ML
2 INJECTION, SOLUTION SUBCUTANEOUS WEEKLY
Qty: 9 ML | Refills: 0 | Status: SHIPPED | OUTPATIENT
Start: 2023-06-06

## 2023-06-06 NOTE — PROGRESS NOTES
"Chief Complaint  Diabetes (3 month f/u dm )    Subjective        Ramy Reyez presents to [facility]  History of Present Illness  Here for f/u on weight mgmt and T2DM.  Has been doing well since last visit. Disappointed has not lost as much weight as he did initially.  Not been eating as well as he should.  Denies side effects Ozempic.    Mentally feels OK-continues with psychiatry-no recent med changes, maintaining sobriety.     Feeling age-arthritic changes, urination changes-joints hurt more and work has become harder as he is on feet for long periods of time-back hurts more. Current meds do not seem as helpful.  Pain characteristics not changed and no BB loss function, no saddle anaesthesia.  Pain is still located across whole low back without radiculopathy.     Has had mild urinary post void dribbling and now using pad but this has not changed recently.  No hematuria. H is followed by urology.     [facility] exam done at [facility] and thinks it was normal.       Objective   Vital Signs:  /83   Pulse 91   Temp 95.9 °F (35.5 °C)   Ht 193 cm (76\")   Wt (!) 148 kg (327 lb)   SpO2 96%   BMI 39.80 kg/m²   Estimated body mass index is 39.8 kg/m² as calculated from the following:    Height as of this encounter: 193 cm (76\").    Weight as of this encounter: 148 kg (327 lb).       Class 2 Severe Obesity (BMI >=35 and <=39.9). Obesity-related health conditions include the following: hypertension, diabetes mellitus, dyslipidemias, and osteoarthritis. Obesity is improving with treatment. BMI is is above average; BMI management plan is completed. We discussed portion control and increasing exercise.      Physical Exam  Vitals and nursing note reviewed.   Constitutional:       General: He is not in acute distress.     Appearance: He is well-developed. He is not ill-appearing or diaphoretic.   HENT:      Head: Normocephalic and atraumatic.      Right Ear: Tympanic membrane, " ear canal and external ear normal.      Left Ear: Tympanic membrane, ear canal and external ear normal.      Nose: Congestion present.      Mouth/Throat:      Mouth: Mucous membranes are moist.      Pharynx: No posterior oropharyngeal erythema.      Comments: Mildly hoarse  Eyes:      General: No scleral icterus.        Right eye: No discharge.         Left eye: No discharge.      Conjunctiva/sclera: Conjunctivae normal.   Cardiovascular:      Rate and Rhythm: Normal rate. Rhythm irregularly irregular.      Pulses:           Dorsalis pedis pulses are 1+ on the right side and 1+ on the left side.   Pulmonary:      Effort: Pulmonary effort is normal.      Breath sounds: Normal breath sounds. No wheezing.   Abdominal:      General: Bowel sounds are normal. There is no distension.      Palpations: Abdomen is soft.      Tenderness: There is no abdominal tenderness.   Musculoskeletal:         General: No deformity.      Cervical back: Neck supple.      Lumbar back: Spasms present. No bony tenderness.      Right foot: No deformity.      Left foot: No deformity.      Comments: Gait smooth and steady  Muscle pain across lumbar region b/l   Feet:      Right foot:      Protective Sensation: 8 sites tested.  9 sites sensed.      Skin integrity: Skin integrity normal.      Toenail Condition: Right toenails are normal.      Left foot:      Protective Sensation: 9 sites tested.  9 sites sensed.      Skin integrity: Skin integrity normal.      Toenail Condition: Left toenails are normal.      Comments: Spider veins with b/l ankle swelling  Compression on right LLE  Lymphadenopathy:      Cervical: No cervical adenopathy.   Skin:     General: Skin is warm and dry.   Neurological:      Mental Status: He is alert and oriented to person, place, and time.      Result Review :                   Assessment and Plan   Diagnoses and all orders for this visit:    1. Diabetes mellitus without complication (Primary)  -     Semaglutide, 2  MG/DOSE, (Ozempic, 2 MG/DOSE,) 8 MG/3ML solution pen-injector; Inject 2 mg under the skin into the appropriate area as directed 1 (One) Time Per Week.  Dispense: 9 mL; Refill: 0  -     Comprehensive Metabolic Panel  -     Hemoglobin A1c  -     Microalbumin / Creatinine Urine Ratio - Urine, Clean Catch    2. Chronic bilateral low back pain without sciatica  -     Ambulatory Referral to Pain Management    3. Iron deficiency anemia, unspecified iron deficiency anemia type  -     Iron Profile    4. Anticoagulated by anticoagulation treatment    5. Obesity (BMI 30-39.9)    On max dose Ozempic which we will continue.  Diet mods discussed.  If A1C or weight increasing at next visit, can see if insurance coverage for Mounjaro.      Will check iron to make sure replenished and then can stop iron for PMH anemia secondary to acute blood loss.  Not symptomatic.     Referral to pain mgmt for chronic back pain-may benefit from injections.  MRI current. S/S that require emergent eval discussed. No RED FLAGS exam or history.      No significant weight loss since last visit and looks to have gained a little. Diet discussed.          Follow Up   Return in about 6 months (around 12/6/2023) for Recheck.  Patient was given instructions and counseling regarding his condition or for health maintenance advice. Please see specific information pulled into the AVS if appropriate.

## 2023-06-07 LAB
ALBUMIN SERPL-MCNC: 4.5 G/DL (ref 3.5–5.2)
ALBUMIN/CREAT UR: 20 MG/G CREAT (ref 0–29)
ALBUMIN/GLOB SERPL: 2 G/DL
ALP SERPL-CCNC: 85 U/L (ref 39–117)
ALT SERPL-CCNC: 23 U/L (ref 1–41)
AST SERPL-CCNC: 24 U/L (ref 1–40)
BILIRUB SERPL-MCNC: 0.6 MG/DL (ref 0–1.2)
BUN SERPL-MCNC: 11 MG/DL (ref 8–23)
BUN/CREAT SERPL: 10.6 (ref 7–25)
CALCIUM SERPL-MCNC: 9.7 MG/DL (ref 8.6–10.5)
CHLORIDE SERPL-SCNC: 101 MMOL/L (ref 98–107)
CO2 SERPL-SCNC: 28.4 MMOL/L (ref 22–29)
CREAT SERPL-MCNC: 1.04 MG/DL (ref 0.76–1.27)
CREAT UR-MCNC: 69.1 MG/DL
EGFRCR SERPLBLD CKD-EPI 2021: 80.2 ML/MIN/1.73
GLOBULIN SER CALC-MCNC: 2.3 GM/DL
GLUCOSE SERPL-MCNC: 93 MG/DL (ref 65–99)
HBA1C MFR BLD: 5.8 % (ref 4.8–5.6)
IRON SATN MFR SERPL: 26 % (ref 20–50)
IRON SERPL-MCNC: 130 MCG/DL (ref 59–158)
MICROALBUMIN UR-MCNC: 14 UG/ML
POTASSIUM SERPL-SCNC: 4.2 MMOL/L (ref 3.5–5.2)
PROT SERPL-MCNC: 6.8 G/DL (ref 6–8.5)
SODIUM SERPL-SCNC: 140 MMOL/L (ref 136–145)
TIBC SERPL-MCNC: 496 MCG/DL
UIBC SERPL-MCNC: 366 MCG/DL (ref 112–346)

## 2023-06-08 DIAGNOSIS — E11.9 DIABETES MELLITUS WITHOUT COMPLICATION: Primary | ICD-10-CM

## 2023-06-09 ENCOUNTER — TELEPHONE (OUTPATIENT)
Dept: PAIN MEDICINE | Facility: CLINIC | Age: 63
End: 2023-06-09
Payer: COMMERCIAL

## 2023-06-13 RX ORDER — METOPROLOL TARTRATE 100 MG/1
TABLET ORAL
Qty: 180 TABLET | Refills: 3 | Status: SHIPPED | OUTPATIENT
Start: 2023-06-13

## 2023-06-15 NOTE — PROGRESS NOTES
The patient has a pain history of the following:  Chronic low back pain    Previous interventions that the patient has received include:   None    Pain medications include:  Flexeril  Metaxalone  Ibuprofen PRN    Other conservative modalities which the patient reports using include:  Physical Therapy: no  Chiropractor: yes - sometimes short-term benefit   Massage Therapy: no  TENS: yes  Neck or back surgery: no  Past pain management: no  Ice     Past Significant Surgical History:  None     HPI:       CHIEF COMPLAINT: Back Pain    Ramy Reyez is a 63 y.o. male referred here by HARDY Gallegos. Ramy Reyez presents to the office for evaluation and treatment of Back Pain      History of Present Illness  Onset:  20 years ago   Inciting Event:  None, just wear and tear  Location:  Low back  Pain: Pain described as aching and throbbing. Located in the low back and does not radiate into his lower extremities.  2-3 times per month he has pain that is shooting, stabbing.  This is usually after doing something strenuous or repetitive.  This shooting and stabbing pain is localized to his low back, without lower extremity radiation. Approximately 2 weeks ago he had pain going into his right lower extremity, but that's the only occurrence of lower extremity pain.   Severity:  Pain rated as a 4 /10.  Apportions pain as 99%  back pain and 1% extremity pain.  Symptoms have been episodic.    Exacerbation:  Standing for prolonged amounts of time, repetitive motion, hitting bumps in the car   Alleviation:  Rest, stretching.  Associated Symptoms:   He denies any new onset of bowel or bladder weakness, significant leg weakness or saddle anesthesia.  He has urinary urgency.  Ambulates: Without assistive device  Limitations: This pain limits the patient from driving or going to work without pain, or even standing from seated without using his arms.   Goals: Functional goals include ability to do the above.     [place] 31     PEG  Assessment   What number best describes your pain on average in the past week?4  What number best describes how, during the past week, pain has interfered with your enjoyment of life?7  What number best describes how, during the past week, pain has interfered with your general activity?  7        Current Outpatient Medications:     ARIPiprazole (ABILIFY) 5 MG tablet, Take 2.5 mg by mouth Daily., Disp: , Rfl:     cyclobenzaprine (FLEXERIL) 10 MG tablet, TAKE 1 TABLET BY MOUTH THREE TIMES DAILY AS NEEDED FOR MUSCLE SPASMS, Disp: 30 tablet, Rfl: 0    dilTIAZem (CARDIZEM) 60 MG tablet, TAKE 1 TABLET BY MOUTH THREE TIMES DAILY, Disp: 270 tablet, Rfl: 2    escitalopram (LEXAPRO) 20 MG tablet, Take 1 tablet by mouth Daily., Disp: , Rfl:     LORazepam (ATIVAN) 1 MG tablet, Take 1 tablet by mouth As Needed., Disp: , Rfl: 0    metaxalone (SKELAXIN) 800 MG tablet, TAKE 1 TABLET BY MOUTH THREE TIMES DAILY AS NEEDED, Disp: 90 tablet, Rfl: 0    metFORMIN (GLUCOPHAGE) 1000 MG tablet, TAKE 1 TABLET BY MOUTH TWICE DAILY WITH MEALS (Patient taking differently: Daily With Breakfast.), Disp: 180 tablet, Rfl: 1    methylphenidate (RITALIN) 20 MG tablet, Take 1 tablet by mouth 3 (Three) Times a Day., Disp: , Rfl:     metoprolol tartrate (LOPRESSOR) 100 MG tablet, TAKE 1 TABLET BY MOUTH TWICE DAILY, Disp: 180 tablet, Rfl: 3    rivaroxaban (Xarelto) 20 MG tablet, Take 1 tablet by mouth Daily With Dinner., Disp: 90 tablet, Rfl: 3    Semaglutide, 2 MG/DOSE, (Ozempic, 2 MG/DOSE,) 8 MG/3ML solution pen-injector, Inject 2 mg under the skin into the appropriate area as directed 1 (One) Time Per Week., Disp: 9 mL, Rfl: 0    tadalafil (CIALIS) 5 MG tablet, Daily., Disp: , Rfl:     tamsulosin (FLOMAX) 0.4 MG capsule 24 hr capsule, TAKE 1 CAPSULE BY MOUTH DAILY, Disp: 90 capsule, Rfl: 1    traZODone (DESYREL) 100 MG tablet, TAKE 2 TABLET BY MOUTH EVERYDAY AT BEDTIME, Disp: , Rfl: 1    Vibegron (Gemtesa) 75 MG tablet, Take 75 mg/day by mouth., Disp:  ", Rfl:     The following portions of the patient's history were reviewed and updated as appropriate: allergies, current medications, past family history, past medical history, past social history, past surgical history, and problem list.      REVIEW OF PERTINENT MEDICAL DATA        6/6/23 Creatinine 1.04    3/1/23 Platelets 221 (10*3)    Review of Systems   Constitutional:  Negative for activity change, fatigue and fever.   HENT:  Positive for congestion.    Respiratory:  Negative for cough and chest tightness.    Cardiovascular:  Negative for chest pain.   Gastrointestinal:  Negative for abdominal pain, constipation and diarrhea.   Genitourinary:  Negative for difficulty urinating and dysuria.   Musculoskeletal:  Positive for back pain.   Neurological:  Negative for dizziness, weakness, light-headedness and numbness.   Psychiatric/Behavioral:  Negative for agitation, sleep disturbance and suicidal ideas. The patient is not nervous/anxious.    I have reviewed and confirmed the accuracy of the ROS as documented by the MA/LPN/RN Jasmin Lee MD      Vitals:    06/16/23 1031   BP: 135/87   BP Location: Left arm   Patient Position: Sitting   Cuff Size: Adult   Pulse: 104   Resp: 20   Temp: 96.4 °F (35.8 °C)   TempSrc: Temporal   SpO2: 98%   Weight: (!) 147 kg (323 lb 6.4 oz)   Height: 193 cm (76\")   PainSc:   4         Objective   Physical Exam  Constitutional:       General: He is not in acute distress.  Pulmonary:      Effort: Pulmonary effort is normal. No respiratory distress.   Musculoskeletal:      Comments: Ambulation: Without assistive device   Lumbar Exam:  Appearance: Scoliotic curve absent and scarring absent  Range of Motion: diminished range with pain  Able to tandem, toe, and heel walk: Yes  Palpated over lumbosacral paravertebral regions and transverse processes with positive tenderness appreciated, Bilateral.   Sacroiliac joints are not tender, Bilateral.  Trochanteric bursa are not tender, Bilateral. "  Straight leg raise is negative radiculopathy, Bilateral, but did cause some back pain.  Facet loading is positive for pain, Bilateral.  Paraspinal/adjacent lumbar musculature are not tender to palpation, Bilateral.   Skin:     General: Skin is warm and dry.   Neurological:      Mental Status: He is alert.      Deep Tendon Reflexes:      Reflex Scores:       Patellar reflexes are 2+ on the right side and 2+ on the left side.       Achilles reflexes are 2+ on the right side and 2+ on the left side.  Psychiatric:         Mood and Affect: Mood normal.         Thought Content: Thought content normal.       Assessment & Plan   Diagnoses and all orders for this visit:    1. Spondylosis of lumbar region without myelopathy or radiculopathy (Primary)  -     Case Request  -     Case Request  -     Ambulatory Referral to Physical Therapy Evaluate and treat    2. Chronic bilateral low back pain without sciatica  -     Ambulatory Referral to Physical Therapy Evaluate and treat    3. Displacement of lumbar intervertebral disc without myelopathy    4. Lumbar foraminal stenosis    5. Epidural lipomatosis          - Pertinent labs reviewed.   - Pertinent imaging reviewed.   - Referral placed to physical therapy.   - Will schedule of diagnostic Medial branch blocks at ~L4-S1.  Risks discussed including but not limited to bleeding, bruising, infection, damage to surrounding structures, headache, and rare things such as being paralyzed, seizure, stroke, heart attack and death.  If good results, plan for second diagnostic injections and Radiofrequency ablation (thermal, non-pulsed).   - Educated to avoid NSAIDs with Xarelto use.  Risks discussed. He understands and will try Tylenol in place of NSAIDs.   - Ramy J Aissatou reports a pain score of 4.  Given his pain assessment as noted, treatment options were discussed and the following options were decided upon as a follow-up plan to address the patient's pain: referral to Physical Therapy  and injections .    --- Follow-up for bilateral ~L4-S1 Medial branch blocks diagnostic only and office visit 1-2 weeks following.         Jasmin Lee MD  Pain Management    EMR Dragon/Transcription disclaimer:   Much of this encounter note is an electronic transcription/translation of spoken language to printed text. The electronic translation of spoken language may permit erroneous, or at times, nonsensical words or phrases to be inadvertently transcribed; Although I have reviewed the note for such errors, some may still exist.

## 2023-06-16 ENCOUNTER — PREP FOR SURGERY (OUTPATIENT)
Dept: PAIN MEDICINE | Facility: CLINIC | Age: 63
End: 2023-06-16

## 2023-06-16 ENCOUNTER — OFFICE VISIT (OUTPATIENT)
Dept: PAIN MEDICINE | Facility: CLINIC | Age: 63
End: 2023-06-16
Payer: COMMERCIAL

## 2023-06-16 VITALS
TEMPERATURE: 96.4 F | OXYGEN SATURATION: 98 % | RESPIRATION RATE: 20 BRPM | WEIGHT: 315 LBS | HEIGHT: 76 IN | BODY MASS INDEX: 38.36 KG/M2 | SYSTOLIC BLOOD PRESSURE: 135 MMHG | DIASTOLIC BLOOD PRESSURE: 87 MMHG | HEART RATE: 104 BPM

## 2023-06-16 DIAGNOSIS — G89.29 CHRONIC BILATERAL LOW BACK PAIN WITHOUT SCIATICA: ICD-10-CM

## 2023-06-16 DIAGNOSIS — M48.061 LUMBAR FORAMINAL STENOSIS: ICD-10-CM

## 2023-06-16 DIAGNOSIS — M51.26 DISPLACEMENT OF LUMBAR INTERVERTEBRAL DISC WITHOUT MYELOPATHY: ICD-10-CM

## 2023-06-16 DIAGNOSIS — D17.79 EPIDURAL LIPOMATOSIS: ICD-10-CM

## 2023-06-16 DIAGNOSIS — M47.816 SPONDYLOSIS OF LUMBAR REGION WITHOUT MYELOPATHY OR RADICULOPATHY: Primary | ICD-10-CM

## 2023-06-16 DIAGNOSIS — M54.50 CHRONIC BILATERAL LOW BACK PAIN WITHOUT SCIATICA: ICD-10-CM

## 2023-06-16 RX ORDER — SODIUM CHLORIDE 0.9 % (FLUSH) 0.9 %
10 SYRINGE (ML) INJECTION EVERY 12 HOURS SCHEDULED
OUTPATIENT
Start: 2023-06-16

## 2023-06-16 RX ORDER — SODIUM CHLORIDE 0.9 % (FLUSH) 0.9 %
10 SYRINGE (ML) INJECTION AS NEEDED
OUTPATIENT
Start: 2023-06-16

## 2023-06-16 NOTE — PATIENT INSTRUCTIONS
"What to expect if we're setting up an injection/procedure    - I have placed the order today, we'll start speaking to your insurance for authorization (this can sometimes take a few weeks).   - You should be scheduled for your procedure before you leave the office.  If you were not, please call our office to schedule.   - If you have any symptoms of an infection or of COVID, please reschedule your procedure.   - Your procedure will be performed in the surgery center in this building.  It is located in the basement level.  You may drive around to the back side of the building for easy access.  - LIGHT Intravenous (IV) sedation or anxiety medication is offered for some procedures: you will NOT be put to sleep.  If you plan to have sedation, do not eat or drink anything on the day of your procedure.   - Most procedures require having someone drive you.  Please make sure you arrange a  unless told otherwise.   - If you take a blood thinner and you were not instructed whether to continue or hold it, please contact us with any questions.      Radiofrequency ablation (RFA):     - Radiofrequency ablation is a term used to describe cauterization or \"burning.\"   - In pain management, we can use this technique with a special needle to target and destroy areas that are causing your pain.   - In most cases, you must have TWO successful \"test injections\" before you are a candidate for RFA.    After your RFA:   - Because heat is used in this technique, it is common to have soreness after the procedure.  Sometimes \"neuritis\" occurs, which feels like tingling, prickly, or sunburn under the skin sensations.   - Ice packs are helpful in decreasing this soreness and preventing post-procedure \"neuritis\" pain.  Use an ice pack for 20 minutes at a time at least 3 times the day of and the day after your procedure.   - It is common to have arm/leg numbness or weakness the day of your procedure, but this should wear off by the " following day.   - It may take up to 6 weeks to gain full benefit from this procedure.

## 2023-06-19 PROBLEM — M47.816 SPONDYLOSIS OF LUMBAR REGION WITHOUT MYELOPATHY OR RADICULOPATHY: Status: ACTIVE | Noted: 2023-06-19

## 2023-07-24 ENCOUNTER — TRANSCRIBE ORDERS (OUTPATIENT)
Dept: SURGERY | Facility: SURGERY CENTER | Age: 63
End: 2023-07-24
Payer: COMMERCIAL

## 2023-07-24 DIAGNOSIS — Z41.9 SURGERY, ELECTIVE: Primary | ICD-10-CM

## 2023-08-28 DIAGNOSIS — R35.0 URINARY FREQUENCY: ICD-10-CM

## 2023-08-28 RX ORDER — TAMSULOSIN HYDROCHLORIDE 0.4 MG/1
1 CAPSULE ORAL DAILY
Qty: 90 CAPSULE | Refills: 1 | Status: SHIPPED | OUTPATIENT
Start: 2023-08-28

## 2023-08-28 RX ORDER — CYCLOBENZAPRINE HCL 10 MG
TABLET ORAL
Qty: 30 TABLET | Refills: 0 | Status: SHIPPED | OUTPATIENT
Start: 2023-08-28

## 2023-08-28 RX ORDER — METAXALONE 800 MG/1
TABLET ORAL
Qty: 90 TABLET | Refills: 0 | Status: SHIPPED | OUTPATIENT
Start: 2023-08-28

## 2023-08-28 NOTE — TELEPHONE ENCOUNTER
Rx Refill Note  Requested Prescriptions     Pending Prescriptions Disp Refills    metaxalone (SKELAXIN) 800 MG tablet [Pharmacy Med Name: METAXALONE 800MG TABLETS] 90 tablet 0     Sig: TAKE 1 TABLET BY MOUTH THREE TIMES DAILY AS NEEDED      Last office visit with prescribing clinician: 6/6/2023   Last telemedicine visit with prescribing clinician: Visit date not found   Next office visit with prescribing clinician: 8/28/2023                         Would you like a call back once the refill request has been completed: [] Yes [] No    If the office needs to give you a call back, can they leave a voicemail: [] Yes [] No    Shakira Street MA  08/28/23, 07:22 EDT

## 2023-08-28 NOTE — TELEPHONE ENCOUNTER
Rx Refill Note  Requested Prescriptions     Pending Prescriptions Disp Refills    cyclobenzaprine (FLEXERIL) 10 MG tablet [Pharmacy Med Name: CYCLOBENZAPRINE 10MG TABLETS] 30 tablet 0     Sig: TAKE 1 TABLET BY MOUTH THREE TIMES DAILY AS NEEDED FOR MUSCLE SPASMS      Last office visit with prescribing clinician: Visit date not found   Last telemedicine visit with prescribing clinician: Visit date not found   Next office visit with prescribing clinician: Visit date not found                         Would you like a call back once the refill request has been completed: [] Yes [] No    If the office needs to give you a call back, can they leave a voicemail: [] Yes [] No    Bladimir Burns MA  08/28/23, 09:00 EDT

## 2023-08-28 NOTE — TELEPHONE ENCOUNTER
Rx Refill Note  Requested Prescriptions     Pending Prescriptions Disp Refills    tamsulosin (FLOMAX) 0.4 MG capsule 24 hr capsule [Pharmacy Med Name: TAMSULOSIN 0.4MG CAPSULES] 90 capsule 1     Sig: TAKE 1 CAPSULE BY MOUTH DAILY      Last office visit with prescribing clinician: 6/6/2023   Last telemedicine visit with prescribing clinician: Visit date not found   Next office visit with prescribing clinician: 12/12/2023                         Would you like a call back once the refill request has been completed: [] Yes [] No    If the office needs to give you a call back, can they leave a voicemail: [] Yes [] No    Shakira Street MA  08/28/23, 07:22 EDT

## 2023-09-06 ENCOUNTER — OFFICE VISIT (OUTPATIENT)
Dept: FAMILY MEDICINE CLINIC | Facility: CLINIC | Age: 63
End: 2023-09-06
Payer: COMMERCIAL

## 2023-09-06 VITALS
BODY MASS INDEX: 38.36 KG/M2 | TEMPERATURE: 97.8 F | HEIGHT: 76 IN | OXYGEN SATURATION: 96 % | WEIGHT: 315 LBS | RESPIRATION RATE: 18 BRPM | DIASTOLIC BLOOD PRESSURE: 82 MMHG | HEART RATE: 104 BPM | SYSTOLIC BLOOD PRESSURE: 140 MMHG

## 2023-09-06 DIAGNOSIS — R21 RASH AND NONSPECIFIC SKIN ERUPTION: Primary | ICD-10-CM

## 2023-09-06 PROCEDURE — 99213 OFFICE O/P EST LOW 20 MIN: CPT | Performed by: NURSE PRACTITIONER

## 2023-09-06 RX ORDER — NYSTATIN AND TRIAMCINOLONE ACETONIDE 100000; 1 [USP'U]/G; MG/G
1 OINTMENT TOPICAL 2 TIMES DAILY
Qty: 60 G | Refills: 0 | Status: SHIPPED | OUTPATIENT
Start: 2023-09-06

## 2023-09-06 NOTE — PROGRESS NOTES
"Chief Complaint  Rash (X 1 week)    Subjective        Ramy Reyez presents to [facility]  History of Present Illness  Ramy Reyez is a 63-year-old male who presents today for evaluation of a rash.    The patient reports he has a small rash on his left abdomen; however it has slightly improved. He denies any exposure to anything recently. He had a similar rash in the past, and Dr. Saeed thought it was fungal. He notes the rash appears in the same location as it was in the past. He was concerned he was getting shingles again.  Objective   Vital Signs:  /82 (BP Location: Right arm, Patient Position: Sitting, Cuff Size: Adult)   Pulse 104   Temp 97.8 °F (36.6 °C) (Temporal)   Resp 18   Ht 193 cm (75.98\")   Wt (!) 149 kg (327 lb 12.8 oz)   SpO2 96%   BMI 39.92 kg/m²   Estimated body mass index is 39.92 kg/m² as calculated from the following:    Height as of this encounter: 193 cm (75.98\").    Weight as of this encounter: 149 kg (327 lb 12.8 oz).               Physical Exam  Cardiovascular:      Rate and Rhythm: Normal rate and regular rhythm.      Heart sounds: Normal heart sounds. No murmur heard.    No friction rub. No gallop.   Pulmonary:      Effort: Pulmonary effort is normal. No respiratory distress.      Breath sounds: Normal breath sounds. No wheezing, rhonchi or rales.   Skin:     General: Skin is warm and dry.      Findings: Rash (left outer abdomen, anular, erythematous with small papular lesions, nonvesicular) present.   Neurological:      Mental Status: He is oriented to person, place, and time.   Psychiatric:         Mood and Affect: Mood normal.      Result Review :                   Assessment and Plan   Diagnoses and all orders for this visit:    1. Rash and nonspecific skin eruption (Primary)    Other orders  -     nystatin-triamcinolone (MYCOLOG) 989691-1.1 UNIT/GM-% ointment; Apply 1 application  topically to the appropriate area as directed 2 (Two) " Times a Day.  Dispense: 60 g; Refill: 0        Diagnoses and all orders for this visit:    1. Rash (Primary)  - The patient was prescribed nystatin-triamcinolone ointment; Apply topically 2 (Two) Times a Day for 7 to 10 days.       Follow Up   No follow-ups on file.  Patient was given instructions and counseling regarding his condition or for health maintenance advice. Please see specific information pulled into the AVS if appropriate.       Transcribed from ambient dictation for HARDY Morris by Della Turcios.  09/06/23   13:19 EDT  Patient or patient representative verbalized consent to the visit recording.  I have personally performed the services described in this document as transcribed by the above individual, and it is both accurate and complete.  Patient or patient representative verbalized consent to the visit recording.  I have personally performed the services described in this document as transcribed by the above individual, and it is both accurate and complete.

## 2023-10-18 NOTE — TELEPHONE ENCOUNTER
Rx Refill Note  Requested Prescriptions     Pending Prescriptions Disp Refills    cyclobenzaprine (FLEXERIL) 10 MG tablet 30 tablet 0     Sig: Take 1 tablet by mouth 3 (Three) Times a Day As Needed. for muscle spams      Last office visit with prescribing clinician: 9/6/2023   Last telemedicine visit with prescribing clinician: Visit date not found   Next office visit with prescribing clinician: Visit date not found       {TIP  Please add Last Relevant Lab Date if appropriate: 06/06/23                 Would you like a call back once the refill request has been completed: [] Yes [] No    If the office needs to give you a call back, can they leave a voicemail: [] Yes [] No    Cecelia Gu MA  10/18/23, 06:58 EDT

## 2023-10-19 RX ORDER — CYCLOBENZAPRINE HCL 10 MG
10 TABLET ORAL 3 TIMES DAILY PRN
Qty: 90 TABLET | Refills: 0 | Status: SHIPPED | OUTPATIENT
Start: 2023-10-19

## 2023-11-06 NOTE — TELEPHONE ENCOUNTER
Rx Refill Note  Requested Prescriptions     Pending Prescriptions Disp Refills    cyclobenzaprine (FLEXERIL) 10 MG tablet [Pharmacy Med Name: CYCLOBENZAPRINE 10MG TABLETS] 30 tablet      Sig: TAKE 1 TABLET BY MOUTH THREE TIMES DAILY AS NEEDED FOR MUSCLE SPASMS      Last office visit with prescribing clinician: 6/6/2023   Last telemedicine visit with prescribing clinician: Visit date not found   Next office visit with prescribing clinician: 12/12/2023                         Would you like a call back once the refill request has been completed: [] Yes [] No    If the office needs to give you a call back, can they leave a voicemail: [] Yes [] No    Bladimir Burns MA  11/06/23, 11:31 EST

## 2023-11-07 RX ORDER — CYCLOBENZAPRINE HCL 10 MG
10 TABLET ORAL 3 TIMES DAILY PRN
Qty: 90 TABLET | Refills: 0 | Status: SHIPPED | OUTPATIENT
Start: 2023-11-07

## 2023-11-14 NOTE — PROGRESS NOTES
"Chief Complaint  Earache (Hurt left ear with Q tip )    Subjective        HPI   Ramy presents to [facility] for an ear issue. He normally sees Darlin Catalan and has multiple (stable) medical issues including DM2, HTN, A fib, obesity.    He apparently hurt his left ear with a qtip, was just clearing his ears out for no particular reason, bled after. He's doing fine, no hearing loss or drainage, but his wife wants to get it checked out. Pain is 0.5/10 . Also with concerns of dry eye, using lubricant drops and getting new glasses. No other sicca sxs.      Objective   Vital Signs:   Vitals:    11/15/23 1244   BP: 134/88   BP Location: Left arm   Patient Position: Sitting   Cuff Size: Large Adult   Pulse: 86   SpO2: 96%   Weight: (!) 154 kg (340 lb 3.2 oz)   Height: 193 cm (75.98\")      Physical Exam  Constitutional:       General: He is not in acute distress.     Appearance: Normal appearance.   HENT:      Head: Normocephalic and atraumatic.      Right Ear: Tympanic membrane and ear canal normal.      Left Ear: Tympanic membrane normal.      Ears:      Comments: Dried blood in canal, could see almost all TM which appeared to be intact  Eyes:      Conjunctiva/sclera: Conjunctivae normal.   Cardiovascular:      Rate and Rhythm: Normal rate.   Pulmonary:      Effort: Pulmonary effort is normal.   Musculoskeletal:         General: No swelling or deformity.   Skin:     Coloration: Skin is not jaundiced.      Findings: No rash.   Neurological:      General: No focal deficit present.      Mental Status: He is alert and oriented to person, place, and time.   Psychiatric:         Mood and Affect: Mood normal.         Behavior: Behavior normal.         Judgment: Judgment normal.          Result Review :     The following data was reviewed by: Darlin Espinosa MD on 11/15/2023:  Microalbumin / Creatinine Urine Ratio - Urine, Clean Catch (06/06/2023 14:41)  Comprehensive Metabolic Panel (06/06/2023 " 14:41)  Hemoglobin A1c (06/06/2023 14:41)  Iron Profile (06/06/2023 14:41)         Assessment and Plan    Diagnoses and all orders for this visit:    1. Abrasion of left ear canal, initial encounter (Primary)    Does not appear to have ruptured tympanic membrane although there was some dried blood in the canal that partially obscured it. No signs of infection. Very minimal pain, no drainage, no hearing loss. Recommend avoid sticking qtips in the ear. RTC if pain, bleeding, drainage for re-eval versus ENT referral.     Re dry eyes, getting new glasses which may help w eye strain. Continue lubricant eye drops. No other sicca sxs.       Follow Up   Return if symptoms worsen or fail to improve.  Patient was given instructions and counseling regarding his condition or for health maintenance advice. Please see specific information pulled into the AVS if appropriate.

## 2023-11-15 ENCOUNTER — OFFICE VISIT (OUTPATIENT)
Dept: FAMILY MEDICINE CLINIC | Facility: CLINIC | Age: 63
End: 2023-11-15
Payer: COMMERCIAL

## 2023-11-15 VITALS
SYSTOLIC BLOOD PRESSURE: 134 MMHG | BODY MASS INDEX: 38.36 KG/M2 | HEIGHT: 76 IN | WEIGHT: 315 LBS | OXYGEN SATURATION: 96 % | HEART RATE: 86 BPM | DIASTOLIC BLOOD PRESSURE: 88 MMHG

## 2023-11-15 DIAGNOSIS — S00.412A ABRASION OF LEFT EAR CANAL, INITIAL ENCOUNTER: Primary | ICD-10-CM

## 2023-11-29 NOTE — TELEPHONE ENCOUNTER
Rx Refill Note  Requested Prescriptions     Pending Prescriptions Disp Refills    Ozempic, 1 MG/DOSE, 4 MG/3ML solution pen-injector [Pharmacy Med Name: OZEMPIC 1MG PER DOSE (1X4MG PEN)] 6 mL 2     Sig: INJECT 1 MG UNDER THE SKIN EVERY WEEK      Last office visit with prescribing clinician: 6/6/2023   Last telemedicine visit with prescribing clinician: Visit date not found   Next office visit with prescribing clinician: 12/12/2023                         Would you like a call back once the refill request has been completed: [] Yes [] No    If the office needs to give you a call back, can they leave a voicemail: [] Yes [] No    Bladimir Burns MA  11/29/23, 13:25 EST

## 2023-11-30 RX ORDER — SEMAGLUTIDE 1.34 MG/ML
INJECTION, SOLUTION SUBCUTANEOUS
Qty: 6 ML | Refills: 2 | OUTPATIENT
Start: 2023-11-30

## 2023-12-01 DIAGNOSIS — E11.9 DIABETES MELLITUS WITHOUT COMPLICATION: Chronic | ICD-10-CM

## 2023-12-01 RX ORDER — SEMAGLUTIDE 2.68 MG/ML
2 INJECTION, SOLUTION SUBCUTANEOUS WEEKLY
Qty: 9 ML | Refills: 0 | Status: SHIPPED | OUTPATIENT
Start: 2023-12-01

## 2023-12-04 ENCOUNTER — TELEPHONE (OUTPATIENT)
Dept: FAMILY MEDICINE CLINIC | Facility: CLINIC | Age: 63
End: 2023-12-04
Payer: COMMERCIAL

## 2023-12-04 DIAGNOSIS — I10 ESSENTIAL HYPERTENSION: ICD-10-CM

## 2023-12-04 DIAGNOSIS — Z12.5 SCREENING FOR PROSTATE CANCER: ICD-10-CM

## 2023-12-04 DIAGNOSIS — E55.9 VITAMIN D DEFICIENCY: ICD-10-CM

## 2023-12-04 DIAGNOSIS — Z00.00 ROUTINE GENERAL MEDICAL EXAMINATION AT A HEALTH CARE FACILITY: ICD-10-CM

## 2023-12-04 DIAGNOSIS — E11.9 DIABETES MELLITUS WITHOUT COMPLICATION: Primary | ICD-10-CM

## 2023-12-04 DIAGNOSIS — E78.2 MIXED HYPERLIPIDEMIA: ICD-10-CM

## 2023-12-05 ENCOUNTER — TELEPHONE (OUTPATIENT)
Dept: FAMILY MEDICINE CLINIC | Facility: CLINIC | Age: 63
End: 2023-12-05

## 2023-12-05 ENCOUNTER — TELEMEDICINE (OUTPATIENT)
Dept: FAMILY MEDICINE CLINIC | Facility: CLINIC | Age: 63
End: 2023-12-05
Payer: COMMERCIAL

## 2023-12-05 DIAGNOSIS — U07.1 COVID-19: Primary | ICD-10-CM

## 2023-12-05 PROCEDURE — 99213 OFFICE O/P EST LOW 20 MIN: CPT | Performed by: NURSE PRACTITIONER

## 2023-12-05 NOTE — TELEPHONE ENCOUNTER
Caller: Ramy Reyez    Relationship: Self    Best call back number: [phone]    What is the best time to reach you: ANYTIME    Who are you requesting to speak with (clinical staff, provider,  specific staff member): HARDY CONCEPCION    What was the call regarding: PATIENT STATED THAT HE TESTED POSITIVE FOR COVID TODAY 12/5/23 AND WANTED TO KNOW WHAT HARDY CONCEPCION WOULD SUGGEST NA D IF HE SHOULD TAKE PAXLOVID.

## 2023-12-05 NOTE — PROGRESS NOTES
"Chief Complaint  COVID Positive (COVID positive today, c/o sore throat, dry cough, head congestion, headache, fatigue since yesterday)    Subjective        Ramy Reyez presents to [facility]  History of Present Illness  pt scheduled video visit for COVID.  He tested positive today.  He developed sore throat, head congestion, headache and fatigue that began approximately 1 day prior.  He never suspected he had COVID as he has had no known contacts.  He is vaccinated other than he did not receive most recent COVID-vaccine.  He has no dyspnea, chest pain.  No current fever or chills.    Patient would like antiviral medication given his comorbidities.    Objective   Vital Signs:  There were no vitals taken for this visit.  Estimated body mass index is 41.43 kg/m² as calculated from the following:    Height as of 11/15/23: 193 cm (75.98\").    Weight as of 11/15/23: 154 kg (340 lb 3.2 oz).               Physical Exam  Limited by video visit.  Is well appearing and does not seem to be distressed.  Seems alert and oriented and mood and affect are normal, good historian of medical history.  No cough or dyspnea appreciated, able to complete sentences without problem.     Result Review :                   Assessment and Plan   Diagnoses and all orders for this visit:    1. COVID-19 (Primary)  -     Molnupiravir (LAGEVRIO) 200 MG capsule; Take 4 capsules by mouth Every 12 (Twelve) Hours for 5 days.  Dispense: 40 capsule; Refill: 0    Patient is positive for COVID with multiple comorbidities.  We discussed risks and benefits of the medication for COVID and expected course, duration, complications and signs and symptoms that require emergent evaluation.  He is on Xarelto which is a contraindication for Paxlovid.  We have decided on molnupiravir which he would like to try.  He is aware of EA U and no FDA approval, possibility that he may not be able to get the medicine if it not available at " pharmacies and may have large co-pay now that it is covered under patient insurance.  I have asked him to let me know if he is able to get the medication and how he is doing prior to the end of week.    CDC recommended guidelines for isolation and masking discussed with patient.  Recommend vaccine 3 months after illness.    Patient gave consent today for a telehealth video visit.  [employer] video link used for visit with good A/V quality from mobile device in office and patient at home.       I spent 15 minutes caring for Ramy on this date of service. This time includes time spent by me in the following activities:preparing for the visit, performing a medically appropriate examination and/or evaluation , counseling and educating the patient/family/caregiver, ordering medications, tests, or procedures, and documenting information in the medical record  Follow Up   No follow-ups on file.  Patient was given instructions and counseling regarding his condition or for health maintenance advice. Please see specific information pulled into the AVS if appropriate.

## 2023-12-18 ENCOUNTER — TELEPHONE (OUTPATIENT)
Dept: CARDIOLOGY | Facility: CLINIC | Age: 63
End: 2023-12-18

## 2023-12-18 NOTE — TELEPHONE ENCOUNTER
Caller: Ramy Reyez    Relationship to patient: Self    Best call back number: [phone]    Chief complaint: NO AVAILABILITY     Type of visit: FOLLOW UP     Requested date: AS SOON AS POSSIBLE     If rescheduling, when is the original appointment: 12.22.23     Additional notes:PATIENT RECEIVED A CALL STATING THAT DR. POTTER WOULD NOT BE IN OFFICE AND WOULD NEED TO RESCHEDULE 12.22.23. PLEASE CONTACT PATIENT TO RESCHEDULE. THANK YOU.

## 2023-12-19 RX ORDER — CYCLOBENZAPRINE HCL 10 MG
10 TABLET ORAL 3 TIMES DAILY PRN
Qty: 90 TABLET | Refills: 0 | Status: SHIPPED | OUTPATIENT
Start: 2023-12-19

## 2023-12-19 NOTE — TELEPHONE ENCOUNTER
12/19/23    I called the patient and left a voicemail for a call back number for him to reschedule with Dr. Valentin.    Thanks  Glenda

## 2023-12-19 NOTE — TELEPHONE ENCOUNTER
PATIENT STATED THAT HE WAS RETURNING A CALL ABOUT RESCHEDULING AN APPOINTMENT WITH DR. POTTER. PLEASE CONTACT PATIENT TO SCHEDULE. THANK YOU.

## 2023-12-19 NOTE — TELEPHONE ENCOUNTER
Rx Refill Note  Requested Prescriptions     Pending Prescriptions Disp Refills    cyclobenzaprine (FLEXERIL) 10 MG tablet [Pharmacy Med Name: CYCLOBENZAPRINE 10MG TABLETS] 90 tablet 0     Sig: TAKE 1 TABLET BY MOUTH THREE TIMES DAILY AS NEEDED FOR MUSCLE SPASMS      Last office visit with prescribing clinician: 6/6/2023   Last telemedicine visit with prescribing clinician: 12/5/2023   Next office visit with prescribing clinician: 12/29/2023                         Would you like a call back once the refill request has been completed: [] Yes [] No    If the office needs to give you a call back, can they leave a voicemail: [] Yes [] No    Shakira Street MA  12/19/23, 12:24 EST

## 2023-12-29 ENCOUNTER — OFFICE VISIT (OUTPATIENT)
Dept: FAMILY MEDICINE CLINIC | Facility: CLINIC | Age: 63
End: 2023-12-29
Payer: COMMERCIAL

## 2023-12-29 VITALS
HEART RATE: 88 BPM | HEIGHT: 76 IN | TEMPERATURE: 97.3 F | DIASTOLIC BLOOD PRESSURE: 92 MMHG | WEIGHT: 315 LBS | RESPIRATION RATE: 18 BRPM | SYSTOLIC BLOOD PRESSURE: 139 MMHG | OXYGEN SATURATION: 99 % | BODY MASS INDEX: 38.36 KG/M2

## 2023-12-29 DIAGNOSIS — Z79.01 ANTICOAGULATED BY ANTICOAGULATION TREATMENT: ICD-10-CM

## 2023-12-29 DIAGNOSIS — M25.551 RIGHT HIP PAIN: ICD-10-CM

## 2023-12-29 DIAGNOSIS — E11.9 DIABETES MELLITUS WITHOUT COMPLICATION: ICD-10-CM

## 2023-12-29 DIAGNOSIS — Z00.00 ROUTINE GENERAL MEDICAL EXAMINATION AT A HEALTH CARE FACILITY: Primary | ICD-10-CM

## 2023-12-29 DIAGNOSIS — E66.01 OBESITY, MORBID (MORE THAN 100 LBS OVER IDEAL WEIGHT OR BMI > 40): ICD-10-CM

## 2023-12-29 DIAGNOSIS — E11.9 DIABETES MELLITUS WITHOUT COMPLICATION: Primary | ICD-10-CM

## 2023-12-29 DIAGNOSIS — I87.2 EDEMA OF RIGHT LOWER EXTREMITY DUE TO PERIPHERAL VENOUS INSUFFICIENCY: ICD-10-CM

## 2023-12-29 DIAGNOSIS — I10 ESSENTIAL HYPERTENSION: ICD-10-CM

## 2023-12-29 DIAGNOSIS — M54.50 CHRONIC BILATERAL LOW BACK PAIN WITHOUT SCIATICA: ICD-10-CM

## 2023-12-29 DIAGNOSIS — R07.89 ATYPICAL CHEST PAIN: ICD-10-CM

## 2023-12-29 DIAGNOSIS — G89.29 CHRONIC BILATERAL LOW BACK PAIN WITHOUT SCIATICA: ICD-10-CM

## 2023-12-29 LAB — HBA1C MFR BLD: 5.9 % (ref 4.8–5.6)

## 2023-12-29 NOTE — PROGRESS NOTES
"Chief Complaint  No chief complaint on file.    CC: physical    Subjective        Ramy Reyez presents to [facility]  History of Present Illness pt here for physical.  Overall doing well.  Health concerns are right hip pain for couple of months and right leg swelling x couple weeks. Hip pain located posterolateral and hurts with walking and standing, no radiculopathy, injuries, BB change, saddle anaesthesia.  Does have some chronic LBP intermittently-but that has not been bothersome lately.      Typically wears light compression b/l legs, but recently right leg swelling has been more bothersome and does not change with or without elevation.  Not been checking weight as he is frustrated that despite Wegovy he has been over eating and gaining back some of the weight he lost.  No side effects.  Has seen vascular in past and been active.  Not missed doses of anticoagulants. Has new skin lesion right anterior thigh x couple weeks. Not painful or itchy.      Has had couple episodes of substernal chest pain-usually when sitting.  Not had when active.  Lasts few minutes and without associated palpitations, n/v, radiation, dyspnea, diaphoresis.  Can feel Afib chronically.  No RVR.  Was to see cardiology in few weeks but they rescheduled. Has tried TUMS which previously helped, but not helpful now.     Would like to see about switching to Mounjaro to see if more helpful for weight loss.  Had labs recently, but no A1C done.  Diabetic eye exam done recently at [facility] in [place].    +Chronic urinary urgency with some incontinence despite meds.  No hematuria    Trying to tackle overeating with 12 step type plan which has been successful in keeping him sober      Objective   Vital Signs:  /92   Pulse 88   Temp 97.3 °F (36.3 °C) (Infrared)   Resp 18   Ht 193 cm (76\")   Wt (!) 154 kg (339 lb 8.1 oz)   SpO2 99%   BMI 41.33 kg/m²   Estimated body mass index is 41.33 kg/m² as " "calculated from the following:    Height as of this encounter: 193 cm (76\").    Weight as of this encounter: 154 kg (339 lb 8.1 oz).               Physical Exam  Vitals and nursing note reviewed.   Constitutional:       General: He is not in acute distress.     Appearance: He is well-developed. He is not ill-appearing or diaphoretic.   HENT:      Head: Normocephalic and atraumatic.      Right Ear: Tympanic membrane, ear canal and external ear normal.      Left Ear: Tympanic membrane, ear canal and external ear normal.      Mouth/Throat:      Mouth: Mucous membranes are moist.      Pharynx: Uvula midline. No posterior oropharyngeal erythema.   Eyes:      General: No scleral icterus.        Right eye: No discharge.         Left eye: No discharge.      Conjunctiva/sclera: Conjunctivae normal.   Neck:      Thyroid: No thyromegaly.   Cardiovascular:      Rate and Rhythm: Normal rate. Rhythm irregularly irregular.      Pulses:           Dorsalis pedis pulses are 1+ on the right side and 1+ on the left side.   Pulmonary:      Effort: Pulmonary effort is normal.      Breath sounds: Normal breath sounds.   Abdominal:      General: Bowel sounds are normal.      Palpations: Abdomen is soft.   Musculoskeletal:         General: No deformity.      Cervical back: Neck supple.      Lumbar back: No deformity, tenderness or bony tenderness. Decreased range of motion.      Right hip: No deformity, bony tenderness or crepitus. Normal range of motion.   Feet:      Right foot:      Protective Sensation: 9 sites tested.  9 sites sensed.      Toenail Condition: Right toenails are normal.      Left foot:      Protective Sensation: 9 sites tested.  9 sites sensed.      Skin integrity: Callus present.      Toenail Condition: Left toenails are normal.      Comments: Right venous stasis, no cellulitis  Mildly discolored with spider veins, varicosities, sluggish refill, !+ edema foot, ankle  Lymphadenopathy:      Cervical: No cervical " adenopathy.   Skin:     General: Skin is warm and dry.   Neurological:      Mental Status: He is alert and oriented to person, place, and time.        Result Review :                   Assessment and Plan   Diagnoses and all orders for this visit:    1. Routine general medical examination at a health care facility (Primary)    2. Edema of right lower extremity due to peripheral venous insufficiency    3. Diabetes mellitus without complication  -     Hemoglobin A1c    4. Atypical chest pain    5. Essential hypertension    6. Chronic bilateral low back pain without sciatica    7. Right hip pain    8. Obesity, morbid (more than 100 lbs over ideal weight or BMI > 40)    9. Anticoagulated by anticoagulation treatment    Appropriate health maintenance and prevention topics specific for this patient were discussed today.  Additionally, health goals, and health concerns addressed as appropriate.  Pt was encouraged to stay up to date on recommended screenings and vaccines based on USPSTF guidelines.     No evidence of cellulitis.  I dont see that he has seen vascular since 2017 and will refer back.  Make sure to wear compression, continue anticoagulants-RTC worsening.  Weight loss may be helpful.  Right leg swelling may be exacerbating chronic right OA vs worsening LBP.  If not improving, let me know.  Recommend   PT.      T2DM:  check A1C today.  Will switch to Mounjaro and check coverage.  Side effects reviewed.  Diet mods.  Foot exam/care done.  Obtain eye exam.    CP:  does not seem cardiac.  Recommend 30 days Pepcid AC to see if helpful.  F/U with cardiology.  ER for emergent sx.     HTN:  BP up a little today.  Continue current meds.  CMP and micro OK.      Continue anticoagulants as directed.  No evidence of bleeding.  Previous bleed resolved and Fe has been restored.     Obesity:  has gained back 12 of lost lbs on max dose Ozempic.  Best to switch to more potent medication.  Obesity impacting all areas health          Follow Up   No follow-ups on file.  Patient was given instructions and counseling regarding his condition or for health maintenance advice. Please see specific information pulled into the AVS if appropriate.

## 2024-02-09 ENCOUNTER — OFFICE VISIT (OUTPATIENT)
Dept: FAMILY MEDICINE CLINIC | Facility: CLINIC | Age: 64
End: 2024-02-09
Payer: COMMERCIAL

## 2024-02-09 VITALS
RESPIRATION RATE: 16 BRPM | HEIGHT: 76 IN | DIASTOLIC BLOOD PRESSURE: 80 MMHG | SYSTOLIC BLOOD PRESSURE: 120 MMHG | TEMPERATURE: 97.5 F | WEIGHT: 315 LBS | HEART RATE: 94 BPM | OXYGEN SATURATION: 96 % | BODY MASS INDEX: 38.36 KG/M2

## 2024-02-09 DIAGNOSIS — J06.9 UPPER RESPIRATORY TRACT INFECTION, UNSPECIFIED TYPE: Primary | ICD-10-CM

## 2024-02-09 RX ORDER — METHYLPREDNISOLONE 4 MG/1
TABLET ORAL
Qty: 1 EACH | Refills: 0 | Status: SHIPPED | OUTPATIENT
Start: 2024-02-09

## 2024-02-09 RX ORDER — TRIAMCINOLONE ACETONIDE 40 MG/ML
40 INJECTION, SUSPENSION INTRA-ARTICULAR; INTRAMUSCULAR ONCE
Status: COMPLETED | OUTPATIENT
Start: 2024-02-09 | End: 2024-02-09

## 2024-02-09 RX ORDER — DEXTROMETHORPHAN HYDROBROMIDE AND PROMETHAZINE HYDROCHLORIDE 15; 6.25 MG/5ML; MG/5ML
5 SYRUP ORAL 4 TIMES DAILY PRN
Qty: 118 ML | Refills: 0 | Status: SHIPPED | OUTPATIENT
Start: 2024-02-09

## 2024-02-09 RX ADMIN — TRIAMCINOLONE ACETONIDE 40 MG: 40 INJECTION, SUSPENSION INTRA-ARTICULAR; INTRAMUSCULAR at 14:28

## 2024-02-09 NOTE — PROGRESS NOTES
"Chief Complaint  Sinus Problem    Subjective        Ramy Reyez presents to [facility]  Sinus Problem     patient here for suspected sinusitis that began on Monday wet sinus congestion.  He took 3 COVID test at home which were negative.  He has cough which is worse during the day, mostly dry.  Does not typically get allergies this time of year.  He has no sore throat or ear pain.  No fever or chills.  Is somewhat fatigued.  No known sick contacts.      Objective   Vital Signs:  /80   Pulse 94   Temp 97.5 °F (36.4 °C) (Temporal)   Resp 16   Ht 193 cm (76\")   Wt (!) 160 kg (351 lb 12.8 oz)   SpO2 96%   BMI 42.82 kg/m²   Estimated body mass index is 42.82 kg/m² as calculated from the following:    Height as of this encounter: 193 cm (76\").    Weight as of this encounter: 160 kg (351 lb 12.8 oz).               Physical Exam  Vitals and nursing note reviewed.   Constitutional:       General: He is not in acute distress.     Appearance: He is well-developed. He is ill-appearing (Mildly acutely). He is not diaphoretic.   HENT:      Head: Normocephalic and atraumatic.      Right Ear: Ear canal and external ear normal. A middle ear effusion is present. Tympanic membrane is not erythematous.      Left Ear: Ear canal and external ear normal. A middle ear effusion is present. Tympanic membrane is not erythematous.      Nose: Congestion and rhinorrhea present.      Mouth/Throat:      Mouth: Mucous membranes are moist.      Pharynx: Posterior oropharyngeal erythema present. No oropharyngeal exudate.   Eyes:      General: No scleral icterus.        Right eye: No discharge.         Left eye: No discharge.      Conjunctiva/sclera: Conjunctivae normal.   Cardiovascular:      Rate and Rhythm: Normal rate and regular rhythm.   Pulmonary:      Effort: Pulmonary effort is normal.      Breath sounds: Normal breath sounds. No wheezing.   Abdominal:      General: Bowel sounds are normal.      " Palpations: Abdomen is soft.   Musculoskeletal:         General: No deformity.      Cervical back: Neck supple.      Comments: Gait smooth and steady   Lymphadenopathy:      Cervical: No cervical adenopathy.   Skin:     General: Skin is warm and dry.   Neurological:      Mental Status: He is alert and oriented to person, place, and time.   Psychiatric:         Mood and Affect: Mood normal.         Behavior: Behavior normal.        Result Review :                     Assessment and Plan     Diagnoses and all orders for this visit:    1. Upper respiratory tract infection, unspecified type (Primary)  -     promethazine-dextromethorphan (PROMETHAZINE-DM) 6.25-15 MG/5ML syrup; Take 5 mL by mouth 4 (Four) Times a Day As Needed for Cough.  Dispense: 118 mL; Refill: 0  -     methylPREDNISolone (MEDROL) 4 MG dose pack; Take as directed on package instructions.  Dispense: 1 each; Refill: 0  -     triamcinolone acetonide (KENALOG-40) injection 40 mg    URI: Negative testing for COVID x 3 at home.  Viral versus allergic etiology.  Antibiotics would not be helpful.  Will give Kenalog today and can start prednisone Dosepak tomorrow with food.  Side effects discussed.  Promethazine DM for cough as needed.  Expected course and duration discussed.           Follow Up     No follow-ups on file.  Patient was given instructions and counseling regarding his condition or for health maintenance advice. Please see specific information pulled into the AVS if appropriate.

## 2024-02-20 RX ORDER — RIVAROXABAN 20 MG/1
20 TABLET, FILM COATED ORAL
Qty: 90 TABLET | Refills: 0 | Status: SHIPPED | OUTPATIENT
Start: 2024-02-20

## 2024-02-23 DIAGNOSIS — R35.0 URINARY FREQUENCY: ICD-10-CM

## 2024-02-23 RX ORDER — TAMSULOSIN HYDROCHLORIDE 0.4 MG/1
1 CAPSULE ORAL DAILY
Qty: 90 CAPSULE | Refills: 1 | Status: SHIPPED | OUTPATIENT
Start: 2024-02-23

## 2024-03-01 DIAGNOSIS — E11.9 DIABETES MELLITUS WITHOUT COMPLICATION: Chronic | ICD-10-CM

## 2024-03-01 RX ORDER — SEMAGLUTIDE 2.68 MG/ML
2 INJECTION, SOLUTION SUBCUTANEOUS WEEKLY
Qty: 9 ML | Refills: 0 | Status: SHIPPED | OUTPATIENT
Start: 2024-03-01

## 2024-03-02 DIAGNOSIS — E11.9 DIABETES MELLITUS WITHOUT COMPLICATION: ICD-10-CM

## 2024-03-04 NOTE — TELEPHONE ENCOUNTER
Rx Refill Note  Requested Prescriptions     Pending Prescriptions Disp Refills    metFORMIN (GLUCOPHAGE) 1000 MG tablet [Pharmacy Med Name: METFORMIN 1000MG TABLETS] 180 tablet 1     Sig: TAKE 1 TABLET BY MOUTH TWICE DAILY WITH MEALS      Last office visit with prescribing clinician: 2/9/2024   Last telemedicine visit with prescribing clinician: 12/5/2023   Next office visit with prescribing clinician: Visit date not found                         Would you like a call back once the refill request has been completed: [] Yes [] No    If the office needs to give you a call back, can they leave a voicemail: [] Yes [] No    Mario Alberto Woodard Rep  03/04/24, 09:14 EST

## 2024-03-06 ENCOUNTER — OFFICE VISIT (OUTPATIENT)
Age: 64
End: 2024-03-06
Payer: MEDICARE

## 2024-03-06 VITALS
SYSTOLIC BLOOD PRESSURE: 132 MMHG | WEIGHT: 315 LBS | DIASTOLIC BLOOD PRESSURE: 80 MMHG | BODY MASS INDEX: 38.36 KG/M2 | HEART RATE: 90 BPM | HEIGHT: 76 IN

## 2024-03-06 DIAGNOSIS — I10 ESSENTIAL HYPERTENSION: ICD-10-CM

## 2024-03-06 DIAGNOSIS — E66.01 OBESITY, CLASS III, BMI 40-49.9 (MORBID OBESITY): ICD-10-CM

## 2024-03-06 DIAGNOSIS — I48.20 ATRIAL FIBRILLATION, CHRONIC: Primary | ICD-10-CM

## 2024-03-06 NOTE — PROGRESS NOTES
Date of Office Visit: 2024  Encounter Provider: Perico Valentin MD  Place of Service: [facility] CARDIOLOG[facility]  Patient Name: Ramy Reyez  : 1960    Subjective:     Encounter Date:2024      Patient ID: Ramy Reyez is a 64 y.o. male who has a cc of  perm AF and he is here for fu.     No anginal chest pain,   No sig matamoros,   No soa,   No fainting,  No orthostasis.   No edema.   Exercise tolerance:  and he walks a lot at work.     There have been no hospital admission since the last visit.     There have been no bleeding events.       Past Medical History:   Diagnosis Date    Abnormal liver function test     Annual physical exam     Atrial fibrillation, chronic     Cardiomyopathy     Class 3 severe obesity due to excess calories in adult     BMI 40.0-44.9    CPAP (continuous positive airway pressure) dependence     Depression     Diabetes mellitus without complication 2022    Drug therapy     Genital war     Health care maintenance     Hypertension     PAF (paroxysmal atrial fibrillation)     Persistent atrial fibrillation     Recovering alcoholic     Sleep apnea     cpap       Social History     Socioeconomic History    Marital status:    Tobacco Use    Smoking status: Never    Smokeless tobacco: Never    Tobacco comments:     caffeine use   Vaping Use    Vaping status: Never Used   Substance and Sexual Activity    Alcohol use: No     Comment: recovery alcoholic    Drug use: Never    Sexual activity: Defer       Family History   Problem Relation Age of Onset    Cancer Mother         uterine     COPD Father     Diabetes Father     Depression Father     Heart attack Father     Alcohol abuse Sister     Thyroid disease Other        Review of Systems   Constitutional: Negative for fever and night sweats.   HENT:  Negative for ear pain and stridor.    Eyes:  Negative for discharge and visual halos.   Cardiovascular:  Negative for cyanosis.   Respiratory:   "Negative for hemoptysis and sputum production.    Hematologic/Lymphatic: Negative for adenopathy.   Skin:  Negative for nail changes and unusual hair distribution.   Musculoskeletal:  Negative for gout and joint swelling.   Gastrointestinal:  Negative for bowel incontinence and flatus.   Genitourinary:  Negative for dysuria and flank pain.   Neurological:  Negative for seizures and tremors.   Psychiatric/Behavioral:  Negative for altered mental status. The patient is not nervous/anxious.             Objective:     Vitals:    03/06/24 1301   BP: 132/80   Pulse: 90   Weight: (!) 155 kg (342 lb)   Height: 193 cm (76\")         Eyes:      General:         Right eye: No discharge.         Left eye: No discharge.   HENT:      Head: Normocephalic and atraumatic.   Neck:      Thyroid: No thyromegaly.      Vascular: No JVD.   Pulmonary:      Effort: Pulmonary effort is normal.      Breath sounds: Normal breath sounds. No rales.   Cardiovascular:      Normal rate. Irregularly irregular rhythm.      No gallop.    Edema:     Peripheral edema absent.   Abdominal:      General: Bowel sounds are normal.      Palpations: Abdomen is soft.      Tenderness: There is no abdominal tenderness.   Musculoskeletal: Normal range of motion.         General: No deformity. Skin:     General: Skin is warm and dry.      Findings: No erythema.   Neurological:      Mental Status: Alert and oriented to person, place, and time.      Motor: Normal muscle tone.   Psychiatric:         Behavior: Behavior normal.         Thought Content: Thought content normal.           ECG 12 Lead    Date/Time: 3/6/2024 1:39 PM  Performed by: Perico Valentin MD    Authorized by: Perico Valentin MD  Comparison: compared with previous ECG   Similar to previous ECG  Rhythm: atrial fibrillation          Lab Review:       Assessment:          Diagnosis Plan   1. Atrial fibrillation, chronic        2. Essential hypertension        3. Obesity, Class III, BMI 40-49.9 (morbid " obesity)               Plan:     AF -- he is on metoprolol and diltiazem and rate control is good. On r-ban.     HTN -- he quit ETOH. Bp good today     Obesity -- on Semaglutide

## 2024-04-22 RX ORDER — DILTIAZEM HYDROCHLORIDE 60 MG/1
TABLET, FILM COATED ORAL
Qty: 270 TABLET | Refills: 2 | Status: SHIPPED | OUTPATIENT
Start: 2024-04-22

## 2024-05-02 RX ORDER — CYCLOBENZAPRINE HCL 10 MG
10 TABLET ORAL 3 TIMES DAILY PRN
Qty: 90 TABLET | Refills: 0 | Status: SHIPPED | OUTPATIENT
Start: 2024-05-02

## 2024-05-02 NOTE — TELEPHONE ENCOUNTER
Rx Refill Note  Requested Prescriptions     Pending Prescriptions Disp Refills    cyclobenzaprine (FLEXERIL) 10 MG tablet [Pharmacy Med Name: CYCLOBENZAPRINE 10MG TABLETS] 90 tablet 0     Sig: TAKE 1 TABLET BY MOUTH THREE TIMES DAILY AS NEEDED FOR MUSCLE SPASMS      Last office visit with prescribing clinician: 2/9/2024   Last telemedicine visit with prescribing clinician: 12/5/2023   Next office visit with prescribing clinician: Visit date not found                         Would you like a call back once the refill request has been completed: [] Yes [] No    If the office needs to give you a call back, can they leave a voicemail: [] Yes [] No    Mario Alberto Woodard Rep  05/02/24, 13:50 EDT

## 2024-05-13 DIAGNOSIS — E11.9 DIABETES MELLITUS WITHOUT COMPLICATION: Chronic | ICD-10-CM

## 2024-05-13 RX ORDER — SEMAGLUTIDE 2.68 MG/ML
2 INJECTION, SOLUTION SUBCUTANEOUS WEEKLY
Qty: 9 ML | Refills: 0 | Status: SHIPPED | OUTPATIENT
Start: 2024-05-13

## 2024-05-13 NOTE — TELEPHONE ENCOUNTER
Caller: Ramy Reyez    Relationship: Self    Best call back number: [phone]    Requested Prescriptions:   Requested Prescriptions     Pending Prescriptions Disp Refills    Semaglutide, 2 MG/DOSE, (Ozempic, 2 MG/DOSE,) 8 MG/3ML solution pen-injector 9 mL 0     Sig: Inject 2 mg under the skin into the appropriate area as directed 1 (One) Time Per Week.        Pharmacy where request should be sent:  [facility], operated by [facility] FAX. #[phone]    Last office visit with prescribing clinician: 2/9/2024   Last telemedicine visit with prescribing clinician: 12/5/2023   Next office visit with prescribing clinician: Visit date not found     Additional details provided by patient: HIS INSURANCE WILL NOT COVER THIS. THE COMPANY THAT MAKES OZEMPIC WILL COVER IT BUT NEEDS TO BE FAXED [phone]    Does the patient have less than a 3 day supply:  [x] Yes  [] No    Would you like a call back once the refill request has been completed: [] Yes [x] No    If the office needs to give you a call back, can they leave a voicemail: [] Yes [x] No    Mario Alberto Dasilva Rep   05/13/24 11:27 EDT

## 2024-05-13 NOTE — TELEPHONE ENCOUNTER
[facility] - Procedure Note    Date of Visit: 12/1/2021    Pre procedure Diagnosis: Lumbar facet arthropathy   Post procedure Diagnosis: Same  Procedure performed: LEFT L4-5 & L5-S1 facet joint injections & lumbar paraspinal trigger point injections  Anesthesia: none  Complications: none  Operators: Pat Cook MD      Indications:   Philipp Bland is a 62 year old male was sent by myself for lumbar facet joint injections.  They have a history of left sided low back pain that does not radiate to his legs.  He also has left thigh weakness and a foot drop.  Exam shows left sided foot drop and pain with extension and rotation bilaterally and they have tried conservative treatment including HEP and medications.    Options/alternatives, benefits and risks were discussed with the patient including bleeding, infection, flared pain, tissue trauma, exposure to radiation, reaction to medications including seizure, spinal cord injury, paralysis, weakness, numbness and headache.    Questions were answered to his satisfaction and he agrees to proceed. Voluntary informed consent was obtained and signed.     Vitals were reviewed: Yes  Allergies were reviewed:  Yes   Medications were reviewed:  Yes   Pre-procedure pain score: 2/10    LUMBAR MRI: 11/10/2021   FINDINGS:  Transitional lumbosacral segment is considered to be S1 with  transverse process on the right and sacralization on the left.  Alignment is significant for slight dextroconvex curvature. There is  also grade 2 anterolisthesis of L5 on S1 related to bilateral L5 pars  defects. Grade 1 retrolisthesis of T12 on L1, L1 on 2, L2 on L3, and  L3 on L4. Bone marrow demonstrates scattered degenerative endplate  change with marrow edema (Modic 1) most conspicuous surrounding the  L2-3 disc joint. Conus medullaris unremarkable terminating at the  level of the L1-2 disc. Cauda equina demonstrates slight bunching  related to spinal canal stenosis and  Rx Refill Note  Requested Prescriptions     Pending Prescriptions Disp Refills    Semaglutide, 2 MG/DOSE, (Ozempic, 2 MG/DOSE,) 8 MG/3ML solution pen-injector 9 mL 0     Sig: Inject 2 mg under the skin into the appropriate area as directed 1 (One) Time Per Week.      Last office visit with prescribing clinician: 2/9/2024   Last telemedicine visit with prescribing clinician: 12/5/2023   Next office visit with prescribing clinician: Visit date not found                         Would you like a call back once the refill request has been completed: [] Yes [] No    If the office needs to give you a call back, can they leave a voicemail: [] Yes [] No    Mario Alberto Woodard Rep  05/13/24, 14:28 EDT     is otherwise unremarkable.  Bilateral sacroiliac joint degenerative change. Heterogeneous marrow  signal and slight expansion involving the left iliac bone, potentially  related to Paget's disease. Nonspecific T2 hyperintense lesions within  the bilateral kidneys which are incompletely characterized but  statistically likely benign.     Segmental Analysis:      T12-L1:  Mild disc height loss. No herniation. Mild bilateral facet  arthropathy. No foraminal or spinal canal stenosis.      L1-L2:  Mild disc height loss. No herniation. Mild bilateral facet  arthropathy. No foraminal or spinal canal stenosis.       L2-L3:  Severe disc height loss. Disc bulge with bilateral foraminal  protrusion components. Moderate bilateral facet arthropathy. Severe  bilateral foraminal stenosis. Moderate spinal canal stenosis.       L3-L4:  Moderate disc height loss. Disc bulge with right foraminal  protrusion component. Moderate bilateral facet arthropathy. Moderate  bilateral foraminal stenosis. Moderate spinal canal stenosis.     L4-L5:  Mild disc height loss. Disc bulge with central  protrusion/annular fissure. Moderate right and severe left facet  arthropathy. A 6 mm synovial cyst projects anteriorly off the right  facet joint contributing to spinal canal and right lateral recess  narrowing. Mild right foraminal stenosis. Mild-to-moderate left  foraminal stenosis. Moderate spinal canal stenosis.       L5-S1:  Bilateral L5 pars defects with grade 2 anterolisthesis and  severe disc height loss. Severe bilateral foraminal stenosis. No  spinal canal stenosis.     S1-S2: No foraminal or spinal canal stenosis.                                                                   IMPRESSION:    1. Severe degenerative change with multiple stenoses.  2. Bilateral L5 pars defects with grade 2 anterolisthesis of L5 on S1.  3. Slight expansion and heterogeneous marrow signal involving the left  iliac bone which may be related to Paget's  disease.       Procedure:  After getting informed consent, patient was brought into the procedure suite and was placed in a prone position on the procedure table.   A Pause for the Cause was performed.  Patient was prepped and draped in sterile fashion.     Under AP fluoroscopic guidance the L4-5 and L5-S1 facet joints on the LEFT side were identified, and the C-arm was rotated obliquely to the affected side to open the joint space. A total of 4 ml of 1% lidocaine was injected at the needle entry point and needle tract. Then a 22 gauge 5 inch quincke type spinal needle was inserted and advanced under fluoroscopic guidance targeting the superior articular pillar of each joint. Once the needle made a contact with SAP, it was rotated and was then advanced into the joint.    AP fluoroscopic views were obtained to confirm the needle placement. Then,  Omnipaque 300 contrast dye was injected after negative aspiration for heme and CSF in each joint, confirming appropriate placement.  A total of 1mL of Omnipaque was used and 9mL was wasted.    The injection was then accomplished using a solution containing 4ml of 0.5% Bupivicaine mixed with 40mg of kenolog, divided between the 2 joints. The needles were removed.    A left lumbar paraspinal trigger point injection was accomplished using 40mg of kenalog and 8cc of 1% lidocaine.      Hemostasis was achieved, the area was cleaned, and bandaids were placed when appropriate.  The patient tolerated the procedure well, and was taken to the recovery room.    Images were saved to PACS.    Post-procedure pain score: 1/10  Follow-up includes:   -f/u phone call in one week  -f/u with the referring provider    LONG PHELPS MD   Pain Management & Addiction Medicine

## 2024-05-22 NOTE — PROGRESS NOTES
"Chief Complaint  New Patient (RLE Varicose veins with swelling. )    Subjective        HPI: Ramy Reyez is a very pleasant 64 y.o. male seen as a new patient for evaluation of recurrent varicose veins lower extremity with swelling and skin changes.  Estimates a long history of varicose veins of the right but not the left lower extremity, associated with swelling.  Several months ago he experienced rapid onset of severe swelling right lower extremity with pain for about 10 days.  The pain subsided and since then his swelling has improved significantly.  He now has aching and throbbing pain with heaviness and itching with discoloration on both distal calves, much more frequent and severe on the right than on the left.  There is no history of venous thrombosis.  He takes apixaban for chronic atrial fibrillation.  He has been using over-the-counter support stockings daily.  Previously underwent venous ablation here, with technical failure, and underwent another attempt while he was a resident in [place], with technical success.    Objective   Vital Signs:  /74   Ht 193 cm (75.98\")   Wt (!) 155 kg (342 lb)   BMI 41.65 kg/m²   Estimated body mass index is 41.65 kg/m² as calculated from the following:    Height as of this encounter: 193 cm (75.98\").    Weight as of this encounter: 155 kg (342 lb).     Ramy Reyez  reports that he has never smoked. He has never used smokeless tobacco.  Exam: Morbidly obese, BMI 41.6.  Palpable posterior tibial artery pulses bilaterally.  8 mm nontender, compressible varicose veins in the medial right thigh and calf, in the distribution of the GSV.  No varicose veins on the left.  There is dry skin and hyperpigmentation in the right calf to the knee, without lipodermatosclerosis.  There is mild hyperpigmentation on the left confined to the distal calf.  The mild to moderate swelling and pitting edema of the right ankle, trace on the left.  No active ulcers.  Many spider veins and " venulectasias, particularly in the right ankle, foot and toes.    Personal review of data: Report of 2D echo April, 2021 with moderately reduced LVEF 41-45% with LVH.  There is moderate RV dysfunction and moderate RV dilation, with moderate to severe left atrial dilation.  Mild pulmonary hypertension with PA pressure 35-45 mmHg.     Assessment and Plan     Diagnoses and all orders for this visit:    1. 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 Right leg pain  -     Venous w Reflux Lower Extremity - Unilateral CAR; Future  -     Compression Stockings    3. Swelling of limb  -     Venous w Reflux Lower Extremity - Unilateral CAR; Future  -     Compression Stockings    4. Diabetes mellitus without complication  -     metFORMIN (GLUCOPHAGE) 1000 MG tablet; Take 1 tablet by mouth 2 (Two) Times a Day With Meals.    5. Current use of long term anticoagulation    Summary: Pleasant 64-year-old gentleman with previous history of varicose veins of the right lower extremity, post remote varicose vein treatment, possibly EVLA, now with worsening swelling and hyperpigmentation to the proximal calf, CEAP class IVb.  Because of worsening swelling and skin changes, he is concerned about disease progression.  Provided prescription for 20 to 30 mmHg calf high graded compression stocking.  Recommend right lower extremity vein map class II to define previous treatment, patterns of reflux and management options.  Discussed venous reflux disease in general terms.  Anticipate he will be a candidate for Varithena sclerotherapy.  Make note of chronic atrial fibrillation maintained on chronic rivaroxaban anticoagulation.    Follow Up     Return for Follow-up after test.  Patient was given instructions and counseling regarding his condition or for health maintenance advice. Please see specific information pulled into  the AVS if appropriate.

## 2024-05-23 ENCOUNTER — OFFICE VISIT (OUTPATIENT)
Age: 64
End: 2024-05-23
Payer: COMMERCIAL

## 2024-05-23 VITALS
SYSTOLIC BLOOD PRESSURE: 128 MMHG | DIASTOLIC BLOOD PRESSURE: 74 MMHG | WEIGHT: 315 LBS | HEIGHT: 76 IN | BODY MASS INDEX: 38.36 KG/M2

## 2024-05-23 DIAGNOSIS — M79.89 SWELLING OF LIMB: ICD-10-CM

## 2024-05-23 DIAGNOSIS — I83.891 VARICOSE VEINS OF RIGHT LOWER EXTREMITY WITH COMPLICATIONS: Primary | ICD-10-CM

## 2024-05-23 DIAGNOSIS — E11.9 DIABETES MELLITUS WITHOUT COMPLICATION: ICD-10-CM

## 2024-05-23 DIAGNOSIS — Z79.01 CURRENT USE OF LONG TERM ANTICOAGULATION: Chronic | ICD-10-CM

## 2024-05-23 DIAGNOSIS — M79.604 RIGHT LEG PAIN: ICD-10-CM

## 2024-05-23 NOTE — PATIENT INSTRUCTIONS
Compression Stockings    Prescription graded compression stockings have been proven to help people with varicose veins, swelling, blood clots, and venous ulcers.  Compression stockings reduce blood pooling, reduce swelling, reduce the chance of blood clots, and heal venous ulcers.  They also relieve pain.    Compression stockings are available in different styles and strengths.  Just as pills have numbers indicating their doses in milligrams, stockings have numbers indicating their doses in millimeters of mercury, or mmHg.  Stockings with compression less than 20 mmHg can alleviate mild symptoms, but may not be tight enough for severe symptoms.  20-30 mmHg stockings are more snug, and treat advanced symptoms.  At times, severe symptoms may require a 30-40 mmHg stocking.  No matter what compression dose is chosen, everyone must be measured for stockings to ensure a proper fit.    Choosing the tightness of a stocking is a trade-off between the loosest stocking that feels good and the tightest stocking a person can apply.  The correct tightness may be different from person to person.  For example a person may need a 30-40 mmHg stocking to treat symptoms, but finger arthritis, reduced  strength, and back pain leave them unable to apply it.  A 20-30 mmHg stocking may be the best the person can do.  A looser stocking that a person actually wears may improve quality of life more than a tighter stocking that sits at home in a drawer.  There are many brands of stockings, and many are good.  [employer] and [employer] are companies that produce medical grade compression stockings of good quality.    Stockings are available in knee-high, thigh-high, and panty styles.  Each style has advantages and disadvantages.  Knee high stockings are easier to put on and take off.  Though stockings do not cut off the circulation to the calves and feet, some people feel like they do and do not like to wear them for this reason.  A  thigh high stocking is a bit warmer and gives a smooth transition below the knee so it doesn't feel like it is cutting off the circulation.  But thigh high stockings can roll down.  “It Stays” is a roll-on adhesive that can prevent roll-down.  Panty style stockings are most challenging to apply and remove, and are the warmest to wear, but give compression to the mid thigh.  They won't roll down, but they have to be dealt with when using the rest room.  In general, nonprescription calf high stockings cost around $30. For prescription style stockings, calf high styles cost about $50, thigh high about $75, and panty style about $120.  20-30 mmHg stockings generally cost the same as 30-40 mmHg stockings.  They are usually not covered by insurance.     It is best to wear the stockings all day, every day, especially if swelling, discoloration, or sores are present. If leg symptoms are less severe, stockings can be worn as needed, like when a person expects to be standing for a long time, or when experience has shown that the legs are going to hurt and swell.  Stockings help leg symptoms when they are worn, but will not help when they are not worn, and do not correct the underlying disease process.  For most people it is unnecessary to wear them at night, but it is not harmful to do so.  If it is hard to put on and take off stockings, changing them every other night can reduce by one half the number of application/removal cycles.    Many people have problems applying stockings because they do not apply them correctly.  Stockings should be applied like pants, allowing them to hang down.  First slide them over the foot and heel and work them up from there.  Bunching the stocking up like nylons is a sure setup for failure.  Devices like an Easy Slide, Magnide and Gfni-S-Pnbfsc are available to help one to put on stockings.  We can demonstrate proper application techniques and the use of these devices.  There are a number of  Youtube videos outlining stocking strategies as well. Stockings can be applied, worn, and removed by anyone who is motivated to wear them.   Healthy Eating Suggestions    Healthy eating may help you get and keep a healthy body weight, reduce the risk of chronic illnesses, and live a longer and more productive life. It is important to follow a healthy eating pattern. Your nutritional and calorie needs should be met mainly by different nutrient-rich foods.  Here are some tips:  Reading food labels  Read labels and choose the following:  Reduced or low sodium products.  Juices with 100% fruit juice.  Foods with low saturated fats (<3 g per serving) and high polyunsaturated and monounsaturated fats.  Foods with whole grains, such as whole wheat, cracked wheat, brown rice, and wild rice.  Whole grains that are fortified with folic acid. This is recommended for females who are pregnant or who want to become pregnant.  Read labels and do not eat or drink the following:  Foods or drinks with added sugars. These include foods that contain brown sugar, corn sweetener, corn syrup, dextrose, fructose, glucose, high-fructose corn syrup, honey, invert sugar, lactose, malt syrup, maltose, molasses, raw sugar, sucrose, trehalose, or turbinado sugar.  Limit your intake of added sugars to less than 10% of your total daily calories. Do not eat more than the following amounts of added sugar per day:  6 teaspoons (25 g) for females.  9 teaspoons (38 g) for males.  Foods that contain processed or refined starches and grains.  Refined grain products, such as white flour, degermed cornmeal, white bread, and white rice.  Shopping  Choose nutrient-rich snacks, such as vegetables, whole fruits, and nuts. Avoid high-calorie and high-sugar snacks, such as potato chips, fruit snacks, and candy.  Use oil-based dressings and spreads on foods instead of solid fats such as butter, margarine, sour cream, or cream cheese.  Limit pre-made sauces,  "mixes, and \"instant\" products such as flavored rice, instant noodles, and ready-made pasta.  Try more plant-protein sources, such as tofu, black beans, edamame, lentils, nuts, and seeds.  Explore eating plans such as the Mediterranean diet or vegetarian diet.  Try heart-healthy dips made with beans and healthy fats like hummus and guacamole. Vegetables go great with these.  Cooking  Use oil to sauté or stir-shook foods instead of solid fats such as butter, margarine, or lard.  Try baking, boiling, grilling, or broiling instead of frying.  Remove the fatty part of meats before cooking.  Steam vegetables in water or broth.  Meal planning    At meals, imagine dividing your plate into fourths:  One-half of your plate is fruits and vegetables.  One-fourth of your plate is whole grains.  One-fourth of your plate is protein, especially lean meats, poultry, eggs, tofu, beans, or nuts.  Include low-fat dairy as part of your daily diet.  Lifestyle  Choose healthy options in all settings, including home, work, school, restaurants, or stores.  Prepare your food safely:  Wash your hands after handling raw meats.  Where you prepare food, keep surfaces clean by regularly washing with hot, soapy water.  Keep raw meat separate from ready-to-eat foods, such as fruits and vegetables.  , meat, poultry, and eggs to the recommended temperature. Get a food thermometer.  Store foods at safe temperatures. In general:  Keep cold foods at 40°F (4.4°C) or below.  Keep hot foods at 140°F (60°C) or above.  Keep your freezer at 0°F (-17.8°C) or below.  Foods are not safe to eat if they have been between the temperatures of °F (4.4-60°C) for more than 2 hours.  What foods should I eat?  Fruits  Aim to eat 1½-2½ cups of fresh, canned (in natural juice), or frozen fruits each day. One cup of fruit equals 1 small apple, 1 large banana, 8 large strawberries, 1 cup (237 g) canned fruit, ½ cup (82 g) dried fruit, or 1 cup (240 mL) 100% " juice.  Vegetables  Aim to eat 2-4 cups of fresh and frozen vegetables each day, including different varieties and colors. One cup of vegetables equals 1 cup (91 g) broccoli or cauliflower florets, 2 medium carrots, 2 cups (150 g) raw, leafy greens, 1 large tomato, 1 large charles pepper, 1 large sweet potato, or 1 medium white potato.  Grains  Aim to eat 5-10 ounce-equivalents of whole grains each day. Examples of 1 ounce-equivalent of grains include 1 slice of bread, 1 cup (40 g) ready-to-eat cereal, 3 cups (24 g) popcorn, or ½ cup (93 g) cooked rice.  Meats and other proteins  Try to eat 5-7 ounce-equivalents of protein each day. Examples of 1 ounce-equivalent of protein include 1 egg, ½ oz nuts (12 almonds, 24 pistachios, or 7 walnut halves), 1/4 cup (90 g) cooked beans, 6 tablespoons (90 g) hummus or 1 tablespoon (16 g) peanut butter. A cut of meat or fish that is the size of a deck of cards is about 3-4 ounce-equivalents (85 g).  Of the protein you eat each week, try to have at least 8 sounce (227 g) of seafood. This is about 2 servings per week. This includes salmon, trout, herring, sardines, and anchovies.  Dairy  Aim to eat 3 cup-equivalents of fat-free or low-fat dairy each day. Examples of 1 cup-equivalent of dairy include 1 cup (240 mL) milk, 8 ounces (250 g) yogurt, 1½ ounces (44 g) natural cheese, or 1 cup (240 mL) fortified soy milk.  Fats and oils  Aim for about 5 teaspoons (21 g) of fats and oils per day. Choose monounsaturated fats, such as canola and olive oils, mayonnaise made with olive oil or avocado oil, avocados, peanut butter, and most nuts, or polyunsaturated fats, such as sunflower, corn, and soybean oils, walnuts, pine nuts, sesame seeds, sunflower seeds, and flaxseed.  Beverages  Aim for 6 eight-ounce glasses of water per day. Limit coffee to 3-5 eight-ounce cups per day.  Limit caffeinated beverages that have added calories, such as soda and energy drinks.  If you drink alcohol:  Limit how  much you have to:  0-1 drink a day if you are female.  0-2 drinks a day if you are male.  Know how much alcohol is in your drink. In the [place] one drink is one 12 oz bottle of beer (355 mL), one 5 oz glass of wine (148 mL), or one 1½ oz glass of hard liquor (44 mL).  Seasoning and other foods  Try not to add too much salt to your food. Try using herbs and spices instead of salt.  Try not to add sugar to food.  This information is based on [place] nutrition guidelines. To learn more, visit [employer].gov. Exact amounts may vary. You may need different amounts.  This information is not intended to replace advice given to you by your health care provider. Make sure you discuss any questions you have with your health care provider.    Elsevier Patient Education © 2024 [employer]

## 2024-06-11 ENCOUNTER — TELEPHONE (OUTPATIENT)
Dept: FAMILY MEDICINE CLINIC | Facility: CLINIC | Age: 64
End: 2024-06-11

## 2024-06-11 NOTE — TELEPHONE ENCOUNTER
Caller: Ramy Reyez    Relationship to patient: Self    Best call back number: [phone]    Patient is needing:     WOULD LIKE A CALL TO DISCUSS THE OZEMPIC THAT SHOULD HAVE BEEN DELIVERED TO THE OFFICE TODAY FOR HIM.     HE IS ISNT SURE WHAT THE NEXT STEP IS.     PLEASE CALL TO DISCUSS.

## 2024-08-01 ENCOUNTER — HOSPITAL ENCOUNTER (OUTPATIENT)
Facility: HOSPITAL | Age: 64
Discharge: HOME | End: 2024-08-01
Admitting: SURGERY
Payer: MEDICARE

## 2024-08-01 DIAGNOSIS — I83.891 VARICOSE VEINS OF RIGHT LOWER EXTREMITY WITH COMPLICATIONS: ICD-10-CM

## 2024-08-01 DIAGNOSIS — M79.604 RIGHT LEG PAIN: ICD-10-CM

## 2024-08-01 DIAGNOSIS — M79.89 SWELLING OF LIMB: ICD-10-CM

## 2024-08-01 PROCEDURE — 93971 EXTREMITY STUDY: CPT

## 2024-08-05 DIAGNOSIS — E11.9 DIABETES MELLITUS WITHOUT COMPLICATION: ICD-10-CM

## 2024-08-05 LAB
BH CV LOW VAS RIGHT GREATER SAPH AK VESSEL: 1
BH CV LOW VAS RIGHT VARICOSITY BK VESSEL: 1
BH CV LOWER VASCULAR LEFT COMMON FEMORAL AUGMENT: NORMAL
BH CV LOWER VASCULAR LEFT COMMON FEMORAL COMPETENT: NORMAL
BH CV LOWER VASCULAR LEFT COMMON FEMORAL COMPRESS: NORMAL
BH CV LOWER VASCULAR LEFT COMMON FEMORAL PHASIC: NORMAL
BH CV LOWER VASCULAR LEFT COMMON FEMORAL SPONT: NORMAL
BH CV LOWER VASCULAR RIGHT COMMON FEMORAL AUGMENT: NORMAL
BH CV LOWER VASCULAR RIGHT COMMON FEMORAL COMPETENT: NORMAL
BH CV LOWER VASCULAR RIGHT COMMON FEMORAL COMPRESS: NORMAL
BH CV LOWER VASCULAR RIGHT COMMON FEMORAL PHASIC: NORMAL
BH CV LOWER VASCULAR RIGHT COMMON FEMORAL SPONT: NORMAL
BH CV LOWER VASCULAR RIGHT DISTAL FEMORAL COMPRESS: NORMAL
BH CV LOWER VASCULAR RIGHT EXTERNAL ILIAC AUGMENT: NORMAL
BH CV LOWER VASCULAR RIGHT EXTERNAL ILIAC COMPETENT: NORMAL
BH CV LOWER VASCULAR RIGHT EXTERNAL ILIAC COMPRESS: NORMAL
BH CV LOWER VASCULAR RIGHT EXTERNAL ILIAC PHASIC: NORMAL
BH CV LOWER VASCULAR RIGHT EXTERNAL ILIAC SPONT: NORMAL
BH CV LOWER VASCULAR RIGHT GASTRONEMIUS COMPRESS: NORMAL
BH CV LOWER VASCULAR RIGHT GREATER SAPH AK COMPETENT: NORMAL
BH CV LOWER VASCULAR RIGHT GREATER SAPH AK THROMBUS: NORMAL
BH CV LOWER VASCULAR RIGHT LESSER SAPH COMPETENT: NORMAL
BH CV LOWER VASCULAR RIGHT LESSER SAPH COMPRESS: NORMAL
BH CV LOWER VASCULAR RIGHT MID FEMORAL AUGMENT: NORMAL
BH CV LOWER VASCULAR RIGHT MID FEMORAL COMPETENT: NORMAL
BH CV LOWER VASCULAR RIGHT MID FEMORAL COMPRESS: NORMAL
BH CV LOWER VASCULAR RIGHT MID FEMORAL PHASIC: NORMAL
BH CV LOWER VASCULAR RIGHT MID FEMORAL SPONT: NORMAL
BH CV LOWER VASCULAR RIGHT PERONEAL COMPRESS: NORMAL
BH CV LOWER VASCULAR RIGHT POPLITEAL AUGMENT: NORMAL
BH CV LOWER VASCULAR RIGHT POPLITEAL COMPETENT: NORMAL
BH CV LOWER VASCULAR RIGHT POPLITEAL COMPRESS: NORMAL
BH CV LOWER VASCULAR RIGHT POPLITEAL PHASIC: NORMAL
BH CV LOWER VASCULAR RIGHT POPLITEAL SPONT: NORMAL
BH CV LOWER VASCULAR RIGHT POSTERIOR TIBIAL COMPRESS: NORMAL
BH CV LOWER VASCULAR RIGHT PROFUNDA FEMORAL COMPRESS: NORMAL
BH CV LOWER VASCULAR RIGHT PROXIMAL FEMORAL COMPRESS: NORMAL
BH CV LOWER VASCULAR RIGHT SAPHENOFEMORAL JUNCTION AUGMENT: NORMAL
BH CV LOWER VASCULAR RIGHT SAPHENOFEMORAL JUNCTION COMPETENT: NORMAL
BH CV LOWER VASCULAR RIGHT SAPHENOFEMORAL JUNCTION COMPRESS: NORMAL
BH CV LOWER VASCULAR RIGHT SAPHENOFEMORAL JUNCTION PHASIC: NORMAL
BH CV LOWER VASCULAR RIGHT SAPHENOFEMORAL JUNCTION SPONT: NORMAL
BH CV LOWER VASCULAR RIGHT SOLEAL COMPRESS: NORMAL
BH CV LOWER VASCULAR RIGHT SSV MID CALF COMPETENT: NORMAL
BH CV LOWER VASCULAR RIGHT SSV MID CALF COMPRESS: NORMAL
BH CV LOWER VASCULAR RIGHT VARICOSITY AK COMPETENT: NORMAL
BH CV LOWER VASCULAR RIGHT VARICOSITY AK COMPRESS: NORMAL
BH CV LOWER VASCULAR RIGHT VARICOSITY BK COMPETENT: NORMAL
BH CV LOWER VASCULAR RIGHT VARICOSITY BK COMPRESS: NORMAL
BH CV LOWER VASCULAR RIGHT VARICOSITY BK THROMBUS: NORMAL
BH CV RIGHT LOWER VAS COMMON FEMORAL REFLUX COLOR FLOW TIME: 9.91 SEC
BH CV RIGHT LOWER VAS EXT ILIAC REFLUX COLOR FLOW TIME: 3.37 SEC
BH CV RIGHT LOWER VAS GSV PROX THIGH REFLUX TIME: 9.65 SEC
BH CV RIGHT LOWER VAS GSV PROX THIGH TRANS DIAMETER: 0.89 CM
BH CV RIGHT LOWER VAS MID FEMORAL REFLUX TIME: 9.04 SEC
BH CV RIGHT LOWER VAS POPLITEAL REFLUX TIME: 3.73 SEC
BH CV RIGHT LOWER VAS SAPHENOFEM JUNCTION REFLUX TIME: 8.83 SEC
BH CV RIGHT LOWER VAS SAPHENOFEM JUNCTION TRANSVERSE DIAMETER: 0.86 CM
BH CV RIGHT LOWER VAS SSV MID CALF TRANS DIAMETER: 0.46 CM
BH CV RIGHT LOWER VAS SSV PROX CALF TRANS DIAMETER: 0.43 CM
BH CV RIGHT LOWER VAS VARICOSITY AK REFLUX TIME: 3.41 SEC
BH CV RIGHT LOWER VAS VARICOSITY AK TRANS DIAMETER: 0.92 CM
BH CV RIGHT LOWER VAS VARICOSITY BK REFLUX TIME: 3.62 SEC
BH CV RIGHT LOWER VAS VARICOSITY BK TRANS DIAMETER: 0.78 CM
BH CV VAS RIGHT GSV PROXIMAL HIDDEN LRR COMPRESSIBILTY: NORMAL

## 2024-08-05 NOTE — TELEPHONE ENCOUNTER
Rx Refill Note  Requested Prescriptions     Pending Prescriptions Disp Refills    metFORMIN (GLUCOPHAGE) 1000 MG tablet [Pharmacy Med Name: METFORMIN 1000MG TABLETS] 90 tablet 1     Sig: TAKE 1 TABLET BY MOUTH DAILY WITH BREAKFAST      Last office visit with prescribing clinician: 2/9/2024   Last telemedicine visit with prescribing clinician: Visit date not found   Next office visit with prescribing clinician: Visit date not found                         Would you like a call back once the refill request has been completed: [] Yes [] No    If the office needs to give you a call back, can they leave a voicemail: [] Yes [] No    Mario Alberto Woodard Rep  08/05/24, 09:42 EDT

## 2024-09-16 ENCOUNTER — OFFICE VISIT (OUTPATIENT)
Dept: FAMILY MEDICINE CLINIC | Facility: CLINIC | Age: 64
End: 2024-09-16
Payer: MEDICARE

## 2024-09-16 VITALS
RESPIRATION RATE: 14 BRPM | SYSTOLIC BLOOD PRESSURE: 128 MMHG | BODY MASS INDEX: 38.36 KG/M2 | HEART RATE: 80 BPM | DIASTOLIC BLOOD PRESSURE: 86 MMHG | TEMPERATURE: 97 F | OXYGEN SATURATION: 95 % | WEIGHT: 315 LBS | HEIGHT: 76 IN

## 2024-09-16 DIAGNOSIS — Z00.00 ENCOUNTER FOR INITIAL ANNUAL WELLNESS VISIT (AWV) IN MEDICARE PATIENT: Primary | ICD-10-CM

## 2024-09-16 DIAGNOSIS — H91.93 BILATERAL HEARING LOSS, UNSPECIFIED HEARING LOSS TYPE: ICD-10-CM

## 2024-09-16 DIAGNOSIS — E78.2 MIXED HYPERLIPIDEMIA: ICD-10-CM

## 2024-09-16 DIAGNOSIS — Z12.5 SCREENING FOR PROSTATE CANCER: ICD-10-CM

## 2024-09-16 DIAGNOSIS — Z79.01 ANTICOAGULATED BY ANTICOAGULATION TREATMENT: ICD-10-CM

## 2024-09-16 DIAGNOSIS — E11.9 DIABETES MELLITUS WITHOUT COMPLICATION: ICD-10-CM

## 2024-09-16 DIAGNOSIS — I10 ESSENTIAL HYPERTENSION: ICD-10-CM

## 2024-09-16 DIAGNOSIS — E66.01 OBESITY, CLASS III, BMI 40-49.9 (MORBID OBESITY): ICD-10-CM

## 2024-09-16 PROCEDURE — G0438 PPPS, INITIAL VISIT: HCPCS | Performed by: NURSE PRACTITIONER

## 2024-09-16 PROCEDURE — 93000 ELECTROCARDIOGRAM COMPLETE: CPT | Performed by: NURSE PRACTITIONER

## 2024-09-16 PROCEDURE — 1126F AMNT PAIN NOTED NONE PRSNT: CPT | Performed by: NURSE PRACTITIONER

## 2024-09-16 PROCEDURE — 3079F DIAST BP 80-89 MM HG: CPT | Performed by: NURSE PRACTITIONER

## 2024-09-16 PROCEDURE — 3074F SYST BP LT 130 MM HG: CPT | Performed by: NURSE PRACTITIONER

## 2024-09-16 RX ORDER — SOLIFENACIN SUCCINATE 10 MG/1
10 TABLET, FILM COATED ORAL DAILY
COMMUNITY
Start: 2024-08-06

## 2024-09-19 ENCOUNTER — OFFICE VISIT (OUTPATIENT)
Age: 64
End: 2024-09-19
Payer: MEDICARE

## 2024-09-19 VITALS — HEIGHT: 76 IN | BODY MASS INDEX: 38.36 KG/M2 | WEIGHT: 315 LBS

## 2024-09-19 DIAGNOSIS — I83.891 VARICOSE VEINS OF RIGHT LOWER EXTREMITY WITH COMPLICATIONS: Primary | ICD-10-CM

## 2024-09-19 DIAGNOSIS — Z79.01 CURRENT USE OF LONG TERM ANTICOAGULATION: ICD-10-CM

## 2024-09-19 DIAGNOSIS — M79.604 RIGHT LEG PAIN: ICD-10-CM

## 2024-09-19 DIAGNOSIS — M79.89 SWELLING OF LIMB: ICD-10-CM

## 2024-10-03 ENCOUNTER — LAB (OUTPATIENT)
Dept: LAB | Facility: HOSPITAL | Age: 64
End: 2024-10-03
Payer: MEDICARE

## 2024-10-03 LAB
ALBUMIN SERPL-MCNC: 4.3 G/DL (ref 3.5–5.2)
ALBUMIN UR-MCNC: <1.2 MG/DL
ALBUMIN/GLOB SERPL: 1.7 G/DL
ALP SERPL-CCNC: 79 U/L (ref 39–117)
ALT SERPL W P-5'-P-CCNC: 26 U/L (ref 1–41)
ANION GAP SERPL CALCULATED.3IONS-SCNC: 9.3 MMOL/L (ref 5–15)
AST SERPL-CCNC: 20 U/L (ref 1–40)
BILIRUB SERPL-MCNC: 0.4 MG/DL (ref 0–1.2)
BUN SERPL-MCNC: 14 MG/DL (ref 8–23)
BUN/CREAT SERPL: 11.6 (ref 7–25)
CALCIUM SPEC-SCNC: 9.6 MG/DL (ref 8.6–10.5)
CHLORIDE SERPL-SCNC: 101 MMOL/L (ref 98–107)
CHOLEST SERPL-MCNC: 128 MG/DL (ref 0–200)
CO2 SERPL-SCNC: 27.7 MMOL/L (ref 22–29)
CREAT SERPL-MCNC: 1.21 MG/DL (ref 0.76–1.27)
CREAT UR-MCNC: 59.3 MG/DL
DEPRECATED RDW RBC AUTO: 45.6 FL (ref 37–54)
EGFRCR SERPLBLD CKD-EPI 2021: 66.4 ML/MIN/1.73
ERYTHROCYTE [DISTWIDTH] IN BLOOD BY AUTOMATED COUNT: 14.8 % (ref 12.3–15.4)
GLOBULIN UR ELPH-MCNC: 2.6 GM/DL
GLUCOSE SERPL-MCNC: 96 MG/DL (ref 65–99)
HBA1C MFR BLD: 5.8 % (ref 4.8–5.6)
HCT VFR BLD AUTO: 46 % (ref 37.5–51)
HDLC SERPL-MCNC: 36 MG/DL (ref 40–60)
HGB BLD-MCNC: 14.6 G/DL (ref 13–17.7)
LDLC SERPL CALC-MCNC: 74 MG/DL (ref 0–100)
LDLC/HDLC SERPL: 2.02 {RATIO}
MCH RBC QN AUTO: 27 PG (ref 26.6–33)
MCHC RBC AUTO-ENTMCNC: 31.7 G/DL (ref 31.5–35.7)
MCV RBC AUTO: 85.2 FL (ref 79–97)
MICROALBUMIN/CREAT UR: NORMAL MG/G{CREAT}
PLATELET # BLD AUTO: 180 10*3/MM3 (ref 140–450)
PMV BLD AUTO: 10.2 FL (ref 6–12)
POTASSIUM SERPL-SCNC: 4 MMOL/L (ref 3.5–5.2)
PROT SERPL-MCNC: 6.9 G/DL (ref 6–8.5)
PSA SERPL-MCNC: 1.17 NG/ML (ref 0–4)
RBC # BLD AUTO: 5.4 10*6/MM3 (ref 4.14–5.8)
SODIUM SERPL-SCNC: 138 MMOL/L (ref 136–145)
TRIGL SERPL-MCNC: 96 MG/DL (ref 0–150)
VLDLC SERPL-MCNC: 18 MG/DL (ref 5–40)
WBC NRBC COR # BLD AUTO: 7.15 10*3/MM3 (ref 3.4–10.8)

## 2024-10-03 PROCEDURE — 82043 UR ALBUMIN QUANTITATIVE: CPT | Performed by: NURSE PRACTITIONER

## 2024-10-03 PROCEDURE — G0103 PSA SCREENING: HCPCS | Performed by: NURSE PRACTITIONER

## 2024-10-03 PROCEDURE — 82570 ASSAY OF URINE CREATININE: CPT | Performed by: NURSE PRACTITIONER

## 2024-10-03 PROCEDURE — 83036 HEMOGLOBIN GLYCOSYLATED A1C: CPT | Performed by: NURSE PRACTITIONER

## 2024-10-03 PROCEDURE — 80053 COMPREHEN METABOLIC PANEL: CPT | Performed by: NURSE PRACTITIONER

## 2024-10-03 PROCEDURE — 85027 COMPLETE CBC AUTOMATED: CPT | Performed by: NURSE PRACTITIONER

## 2024-10-03 PROCEDURE — 80061 LIPID PANEL: CPT | Performed by: NURSE PRACTITIONER

## 2024-10-17 ENCOUNTER — OFFICE VISIT (OUTPATIENT)
Age: 64
End: 2024-10-17
Payer: MEDICARE

## 2024-10-17 ENCOUNTER — HOSPITAL ENCOUNTER (OUTPATIENT)
Facility: HOSPITAL | Age: 64
Discharge: HOME | End: 2024-10-17
Admitting: SURGERY
Payer: MEDICARE

## 2024-10-17 VITALS — BODY MASS INDEX: 38.36 KG/M2 | HEIGHT: 76 IN | WEIGHT: 315 LBS

## 2024-10-17 DIAGNOSIS — M79.604 RIGHT LEG PAIN: ICD-10-CM

## 2024-10-17 DIAGNOSIS — I83.891 VARICOSE VEINS OF RIGHT LOWER EXTREMITY WITH COMPLICATIONS: ICD-10-CM

## 2024-10-17 DIAGNOSIS — I83.891 VARICOSE VEINS OF RIGHT LOWER EXTREMITY WITH COMPLICATIONS: Primary | ICD-10-CM

## 2024-10-17 DIAGNOSIS — M79.89 SWELLING OF LIMB: ICD-10-CM

## 2024-10-17 LAB
BH CV LOW VAS RIGHT VARICOSITY AK VESSEL: 1
BH CV LOW VAS RIGHT VARICOSITY BK VESSEL: 1
BH CV LOWER VASCULAR RIGHT COMMON FEMORAL COMPRESS: NORMAL
BH CV LOWER VASCULAR RIGHT DISTAL FEMORAL COMPRESS: NORMAL
BH CV LOWER VASCULAR RIGHT EXTERNAL ILIAC COMPRESS: NORMAL
BH CV LOWER VASCULAR RIGHT GASTRONEMIUS COMPRESS: NORMAL
BH CV LOWER VASCULAR RIGHT MID FEMORAL COMPRESS: NORMAL
BH CV LOWER VASCULAR RIGHT POPLITEAL COMPRESS: NORMAL
BH CV LOWER VASCULAR RIGHT PROFUNDA FEMORAL COMPRESS: NORMAL
BH CV LOWER VASCULAR RIGHT PROXIMAL FEMORAL COMPRESS: NORMAL
BH CV LOWER VASCULAR RIGHT SAPHENOFEMORAL JUNCTION COMPRESS: NORMAL
BH CV LOWER VASCULAR RIGHT VARICOSITY AK COMPRESS: NORMAL
BH CV LOWER VASCULAR RIGHT VARICOSITY BK COMPRESS: NORMAL

## 2024-10-17 PROCEDURE — 93971 EXTREMITY STUDY: CPT

## 2024-10-17 NOTE — PROGRESS NOTES
Chief Complaint  Post-op Follow-up and Varicose Veins    Subjective        Ramy Reyez presents to [facility] VASCULAR SURGERY  HPI   Ramy Reyez is a 64 y.o. male that has been followed in our office for venous insufficiency and varicose veins. He under went a right leg Varithena ablation on 10/9/2024 with Dr. Dave. He returns today in follow up along with a spot check. He has been doing well since the procedure with minimal pain. He has been compliant with compression stockings.    Ramy Reyez  reports that he has never smoked. He has never been exposed to tobacco smoke. He has never used smokeless tobacco..        Objective   Vital Signs:  There were no vitals filed for this visit.   Body mass index is 41.65 kg/m².           Physical Exam  Vitals reviewed.   Constitutional:       Appearance: Normal appearance.   HENT:      Head: Normocephalic.   Cardiovascular:      Rate and Rhythm: Normal rate and regular rhythm.      Pulses: Normal pulses.           Dorsalis pedis pulses are 3+ on the right side and 3+ on the left side.        Posterior tibial pulses are 3+ on the right side and 3+ on the left side.   Pulmonary:      Effort: Pulmonary effort is normal.   Skin:     General: Skin is warm.   Neurological:      General: No focal deficit present.      Mental Status: He is alert and oriented to person, place, and time.   Psychiatric:         Mood and Affect: Mood normal.        Result Review :      Spot Check: Duplex Venous Lower Extremity - Right CAR (10/17/2024 15:04)                    Assessment and Plan     Diagnoses and all orders for this visit:    1. Varicose veins of right lower extremity with complications (Primary)  -     Duplex Venous Lower Extremity - Right CAR; Future             Patient is doing well status post endovenous laser ablation.  We discussed that it will take time for these veins to decompress.  We discussed postoperative care instructions including wearing stockings for  a total of 2 weeks. Hemay resume exercise in 2 weeks.  We will plan to follow-up in 6 to 3 months with a repeat venous duplex and to see the surgeon.    Follow Up     Return in about 3 months (around 1/17/2025) for LEV; see CASSIE.      Sanam Daniels, HARDY

## 2024-10-18 RX ORDER — RIVAROXABAN 20 MG/1
20 TABLET, FILM COATED ORAL
Qty: 90 TABLET | Refills: 0 | Status: SHIPPED | OUTPATIENT
Start: 2024-10-18

## 2024-10-29 ENCOUNTER — OFFICE VISIT (OUTPATIENT)
Dept: FAMILY MEDICINE CLINIC | Facility: CLINIC | Age: 64
End: 2024-10-29
Payer: MEDICARE

## 2024-10-29 VITALS
HEIGHT: 76 IN | RESPIRATION RATE: 14 BRPM | TEMPERATURE: 97.4 F | BODY MASS INDEX: 38.36 KG/M2 | OXYGEN SATURATION: 96 % | HEART RATE: 74 BPM | WEIGHT: 315 LBS | SYSTOLIC BLOOD PRESSURE: 138 MMHG | DIASTOLIC BLOOD PRESSURE: 80 MMHG

## 2024-10-29 DIAGNOSIS — R29.898 TRANSIENT RIGHT LEG WEAKNESS: ICD-10-CM

## 2024-10-29 DIAGNOSIS — R53.83 FATIGUE, UNSPECIFIED TYPE: Primary | ICD-10-CM

## 2024-10-29 DIAGNOSIS — N32.81 OAB (OVERACTIVE BLADDER): ICD-10-CM

## 2024-10-29 LAB
BILIRUB BLD-MCNC: NEGATIVE MG/DL
CLARITY, POC: CLEAR
COLOR UR: YELLOW
EXPIRATION DATE: NORMAL
GLUCOSE UR STRIP-MCNC: NEGATIVE MG/DL
KETONES UR QL: NEGATIVE
LEUKOCYTE EST, POC: NEGATIVE
Lab: NORMAL
NITRITE UR-MCNC: NEGATIVE MG/ML
PH UR: 6 [PH] (ref 5–8)
PROT UR STRIP-MCNC: NEGATIVE MG/DL
RBC # UR STRIP: NEGATIVE /UL
SP GR UR: 1.01 (ref 1–1.03)
UROBILINOGEN UR QL: NORMAL

## 2024-10-29 PROCEDURE — 3044F HG A1C LEVEL LT 7.0%: CPT | Performed by: NURSE PRACTITIONER

## 2024-10-29 PROCEDURE — 81003 URINALYSIS AUTO W/O SCOPE: CPT | Performed by: NURSE PRACTITIONER

## 2024-10-29 PROCEDURE — 1159F MED LIST DOCD IN RCRD: CPT | Performed by: NURSE PRACTITIONER

## 2024-10-29 PROCEDURE — 90662 IIV NO PRSV INCREASED AG IM: CPT | Performed by: NURSE PRACTITIONER

## 2024-10-29 PROCEDURE — 1126F AMNT PAIN NOTED NONE PRSNT: CPT | Performed by: NURSE PRACTITIONER

## 2024-10-29 PROCEDURE — 3075F SYST BP GE 130 - 139MM HG: CPT | Performed by: NURSE PRACTITIONER

## 2024-10-29 PROCEDURE — 99214 OFFICE O/P EST MOD 30 MIN: CPT | Performed by: NURSE PRACTITIONER

## 2024-10-29 PROCEDURE — 1160F RVW MEDS BY RX/DR IN RCRD: CPT | Performed by: NURSE PRACTITIONER

## 2024-10-29 PROCEDURE — G0008 ADMIN INFLUENZA VIRUS VAC: HCPCS | Performed by: NURSE PRACTITIONER

## 2024-10-29 PROCEDURE — 3079F DIAST BP 80-89 MM HG: CPT | Performed by: NURSE PRACTITIONER

## 2024-10-29 NOTE — PROGRESS NOTES
"Chief Complaint  Fatigue    Subjective        Ramy Reyez presents to [facility] PRIMARY CARE  History of Present Illness    History of Present Illness  Constantly fatigued, sleepy, all he wants to do is sleep.   Began appr 1 month ago  Began about the time he started vesicare-wonders if this could be cause. Saw urology in August and was prescribed this for OAB since Gemtessa costly.    Not drinking enough water he thinks due to OAB.   Has felt a little foggy in head over last month     Denies other med changes. No sick exposures.      Tolerating Ozempic without side effects.  Thinks he is eating enough      Recently had episode where spouse told him he looked different but pt does not know specifically what she meant.  She wondered if he had stroke, but he had no weakness in extremities, no loss sensation, no dysarthria.  Went to bed later that night and woke in middle of night to urinate and had right leg weakness-had to drag leg.  This was 2 days post right leg vascular surgery.  No other sx and so went back to bed and fine in am.     No RVR, chest pain, HA, dizziness, weakness, vision changes.  Takes xarelto religiously and denies skipped doses.  No exertional fatigue different than normal, no orthopnea.         Objective   Vital Signs:  /80   Pulse 74   Temp 97.4 °F (36.3 °C) (Temporal)   Resp 14   Ht 193 cm (75.98\")   Wt (!) 151 kg (333 lb 9.6 oz)   SpO2 96%   BMI 40.63 kg/m²   Estimated body mass index is 40.63 kg/m² as calculated from the following:    Height as of this encounter: 193 cm (75.98\").    Weight as of this encounter: 151 kg (333 lb 9.6 oz).               Physical Exam  Vitals and nursing note reviewed.   Constitutional:       General: He is not in acute distress.     Appearance: He is well-developed. He is not ill-appearing or diaphoretic.   HENT:      Head: Normocephalic and atraumatic.   Eyes:      General:         Right eye: No discharge.         Left eye: No " discharge.      Conjunctiva/sclera: Conjunctivae normal.   Cardiovascular:      Rate and Rhythm: Normal rate. Rhythm irregularly irregular.      Heart sounds: Normal heart sounds.   Pulmonary:      Effort: Pulmonary effort is normal.      Breath sounds: Normal breath sounds.   Abdominal:      General: Bowel sounds are normal. There is no distension.      Palpations: Abdomen is soft.      Tenderness: There is no abdominal tenderness.   Musculoskeletal:         General: No deformity.      Comments: Gait smooth and steady   Skin:     General: Skin is warm and dry.   Neurological:      General: No focal deficit present.      Mental Status: He is alert and oriented to person, place, and time.      Cranial Nerves: No cranial nerve deficit (Cranial nerves II through XII grossly intact).      Motor: No weakness.      Gait: Gait normal.   Psychiatric:         Mood and Affect: Mood normal.         Behavior: Behavior normal.          Physical Exam       Result Review :            Results                  Assessment and Plan     Diagnoses and all orders for this visit:    1. Fatigue, unspecified type (Primary)  -     POC Urinalysis Dipstick, Automated    2. OAB (overactive bladder)    3. Transient right leg weakness    Other orders  -     Fluzone High-Dose 65+yrs        Assessment & Plan  I reviewed urology note.  Does appear to have OA be that is disruptive for him.  I think his symptoms may be due to the Vesicare.  Discussed anticholinergic side effects.  Patient seems more drowsy than he is fatigued.  UA done to be complete and was completely normal.    We discussed semaglutide and whether or not he is eating and drinking enough-which seems like he is.  He has lost about 11 pounds since last visit and A1c is well-controlled.  Kidney function electrolytes were normal when recently checked.  I do not think this is likely etiology of his symptoms.    Patient does not feel like it is related to mental health medications as he  has been stable on these for a while.    If he is not noticing an improvement in his symptoms by the end of next week related to drowsiness will let me know and we will further workup his symptoms.    Unclear whether or not his symptoms recently were strokelike.  I do strongly recommend if he is not clear if he may be having strokelike symptoms he should always err on the side of caution and to go to ER.  TIAs can be warning for impending stroke.  Signs and symptoms discussed with patient.  Patient had no focal deficits and exam today and seems to be at his baseline.            Follow Up     No follow-ups on file.  Patient was given instructions and counseling regarding his condition or for health maintenance advice. Please see specific information pulled into the AVS if appropriate.

## 2024-11-01 DIAGNOSIS — E11.9 DIABETES MELLITUS WITHOUT COMPLICATION: ICD-10-CM

## 2024-11-01 NOTE — TELEPHONE ENCOUNTER
Rx Refill Note  Requested Prescriptions     Pending Prescriptions Disp Refills    metFORMIN (GLUCOPHAGE) 1000 MG tablet [Pharmacy Med Name: METFORMIN 1000MG TABLETS] 90 tablet 1     Sig: TAKE 1 TABLET BY MOUTH DAILY WITH BREAKFAST      Last office visit with prescribing clinician: 10/29/2024   Last telemedicine visit with prescribing clinician: Visit date not found   Next office visit with prescribing clinician: Visit date not found                         Would you like a call back once the refill request has been completed: [] Yes [] No    If the office needs to give you a call back, can they leave a voicemail: [] Yes [] No    Mario Alberto Woodard Rep  11/01/24, 10:23 EDT

## 2024-11-04 RX ORDER — METOPROLOL TARTRATE 100 MG/1
TABLET ORAL
Qty: 180 TABLET | Refills: 3 | Status: SHIPPED | OUTPATIENT
Start: 2024-11-04

## 2025-01-08 RX ORDER — CYCLOBENZAPRINE HCL 10 MG
10 TABLET ORAL 3 TIMES DAILY PRN
Qty: 90 TABLET | Refills: 1 | Status: SHIPPED | OUTPATIENT
Start: 2025-01-08

## 2025-01-08 NOTE — TELEPHONE ENCOUNTER
Rx Refill Note  Requested Prescriptions     Pending Prescriptions Disp Refills    cyclobenzaprine (FLEXERIL) 10 MG tablet [Pharmacy Med Name: CYCLOBENZAPRINE 10MG TABLETS] 90 tablet 0     Sig: TAKE 1 TABLET BY MOUTH THREE TIMES DAILY AS NEEDED FOR MUSCLE SPASMS      Last office visit with prescribing clinician: 10/29/2024   Last telemedicine visit with prescribing clinician: Visit date not found   Next office visit with prescribing clinician: Visit date not found                         Would you like a call back once the refill request has been completed: [] Yes [] No    If the office needs to give you a call back, can they leave a voicemail: [] Yes [] No    Mario Alberto Woodard Rep  01/08/25, 11:11 EST

## 2025-01-09 ENCOUNTER — TELEPHONE (OUTPATIENT)
Dept: FAMILY MEDICINE CLINIC | Facility: CLINIC | Age: 65
End: 2025-01-09
Payer: MEDICARE

## 2025-01-09 NOTE — TELEPHONE ENCOUNTER
Caller: Ramy Reyez    Relationship: Self    Best call back number: [phone]         Who are you requesting to speak with (clinical staff, provider,  specific staff member): PCP OR CLINICAL        What was the call regarding: PATIENT IS ASKING IF OZEMPIC HAS ARRIVED AT OFFICE.  PLEASE CALL TO ADVISE.

## 2025-01-17 ENCOUNTER — TELEPHONE (OUTPATIENT)
Age: 65
End: 2025-01-17
Payer: MEDICARE

## 2025-01-17 DIAGNOSIS — I48.20 ATRIAL FIBRILLATION, CHRONIC: Primary | ICD-10-CM

## 2025-01-17 RX ORDER — WARFARIN SODIUM 5 MG/1
5 TABLET ORAL
Start: 2025-01-17 | End: 2025-01-20 | Stop reason: SDUPTHER

## 2025-01-17 NOTE — TELEPHONE ENCOUNTER
Pt called stating he had discussed switching from R-ban to warfarin with you at [facility]. He wants you to place an order for him to get scheduled with them so he can be scheduled. Please place INR order...Melvi

## 2025-01-20 ENCOUNTER — ANTICOAGULATION VISIT (OUTPATIENT)
Dept: PHARMACY | Facility: HOSPITAL | Age: 65
End: 2025-01-20
Payer: MEDICARE

## 2025-01-20 ENCOUNTER — TELEPHONE (OUTPATIENT)
Age: 65
End: 2025-01-20
Payer: MEDICARE

## 2025-01-20 DIAGNOSIS — I48.20 ATRIAL FIBRILLATION, CHRONIC: Primary | ICD-10-CM

## 2025-01-20 RX ORDER — WARFARIN SODIUM 5 MG/1
TABLET ORAL
Qty: 30 TABLET | Refills: 0 | Status: SHIPPED | OUTPATIENT
Start: 2025-01-20

## 2025-01-20 NOTE — PROGRESS NOTES
Spoke with patient regarding warfarin initiation. He reports to have run out of Xarelto yesterday. Sent prescription for warfarin 5 mg tablets to preferred pharmacy and instructed patient to take 1 tablet nightly starting tonight. Scheduled new patient appointment for Friday 1/24/25 to check INR and provide warfarin education.

## 2025-01-20 NOTE — TELEPHONE ENCOUNTER
Received message regarding pt starting Warfarin. Pt stated he hasn't received any instructions for INR testing or Warfarin dosing.      Anticoag,  Please contact pt to Follow up on this.    Thanks,  GRICEL Paul

## 2025-01-24 ENCOUNTER — ANTICOAGULATION VISIT (OUTPATIENT)
Dept: PHARMACY | Facility: HOSPITAL | Age: 65
End: 2025-01-24
Payer: MEDICARE

## 2025-01-24 DIAGNOSIS — I48.20 ATRIAL FIBRILLATION, CHRONIC: Primary | ICD-10-CM

## 2025-01-24 LAB
INR PPP: 1.4 (ref 0.91–1.09)
PROTHROMBIN TIME: 16.6 SECONDS (ref 10–13.8)

## 2025-01-24 PROCEDURE — 85610 PROTHROMBIN TIME: CPT

## 2025-01-24 PROCEDURE — G0463 HOSPITAL OUTPT CLINIC VISIT: HCPCS

## 2025-01-24 RX ORDER — OMEGA-3S/DHA/EPA/FISH OIL/D3 300MG-1000
400 CAPSULE ORAL DAILY
COMMUNITY

## 2025-01-24 RX ORDER — PHENOL 1.4 %
1 AEROSOL, SPRAY (ML) MUCOUS MEMBRANE DAILY
COMMUNITY

## 2025-01-24 NOTE — TELEPHONE ENCOUNTER
Pt was in to see you Wed 3-17. He called today to let you know he has decided that the Echo you recommended is something he should do. Please place the order so we can schedule...Melvi     weak, confusion, urinating freq

## 2025-01-24 NOTE — PROGRESS NOTES
Anticoagulation Clinic Progress Note  Anticoagulation Summary  As of 2025      INR goal:  2.0-3.0   TTR:  --   INR used for dosin.4 (2025)   Warfarin maintenance plan:  5 mg every day   Weekly warfarin total:  35 mg   Plan last modified:  Swapna Vazquez RPH (2025)   Next INR check:  2025   Target end date:  --    Indications    Atrial fibrillation  chronic [I48.20]                 Anticoagulation Episode Summary       INR check location:  --    Preferred lab:  --    [facility] [place]    Comments:  Last day of xarelto on - start warfarin 5 mg daily and first INR on            Anticoagulation Care Providers       Provider Role Specialty Phone number    Hayden Manzanares, EVANS Referring Physician Assistant [phone]            Clinic Interview:  Patient Findings     Positives:  Upcoming dental procedure    Negatives:  Signs/symptoms of thrombosis, Signs/symptoms of bleeding,   Laboratory test error suspected, Change in health, Change in alcohol use,   Change in activity, Upcoming invasive procedure, Emergency department   visit, Missed doses, Extra doses, Change in medications, Change in   diet/appetite, Hospital admission, Bruising, Other complaints    Comments:  Patient to have tooth extraction on 25.       Clinical Outcomes     Negatives:  Major bleeding event, Thromboembolic event,   Anticoagulation-related hospital admission, Anticoagulation-related ED   visit, Anticoagulation-related fatality    Comments:  Patient to have tooth extraction on 25.         Education:  Ramy Aissatou is a new start in the [facility] Management Clinic. We discussed the followin) Warfarin's indication, mechanism, and dosing  2) Enforced the importance of taking warfarin as instructed and at the same time every day, preferably in the evening so that we can make dose adjustments more easily following subsequent clinic visits  3) What he should do  about a missed dose; pts can take missed doses within about 12 hours of their usual scheduled dose, but he was instructed on the importance of not doubling up on doses unless told to do so by the [facility]  4) Explained possible side effects of warfarin therapy, including increased risk of bleeding, s/sx of bleeding and s/sx of any additional clots/PE/CVA.   5) Discussed monitoring of warfarin, the INR, goal INR range, and the frequency of monitoring  6) Reviewed drug/food/tobacco/EtOH interactions and provided written information covering these topics in more detail, explaining that green, leafy vegetables interact most heavily with warfarin  7) Instructed the pt not to take or discontinue any medications without informing his physician/pharmacist and reminded him to inform us of any dietary changes, as well  8) Explained that he would be coming into the clinic more frequently in these first few weeks of therapy as we try to adjust his dose and achieve a therapeutic INR x 2 consecutive readings. Once that is achieved, patient will follow up in clinic every 4 weeks, on average.    He stated no problems with transportation or scheduling clinic appts in this manner. he expressed understanding of the information provided and has no additional questions at this time.    Ramy Reyez was presented with a copy of the Patients Rights and Responsibilities. he expressed verbal consent and agreement to receive care in the [facility] Management Clinic under the current collaborative care agreement with [facility].       INR History:      1/20/2025    10:05 AM 1/24/2025    10:00 AM   Anticoagulation Monitoring   INR  1.4   INR Date  1/24/2025   INR Goal 2.0-3.0 2.0-3.0   Last Week Total  20 mg   Next Week Total  40 mg   Sun  5 mg   Mon  5 mg   Tue - Wed  -   Thu  -   Fri  7.5 mg (1/24)   Sat  7.5 mg (1/25)       Plan:  1. INR is Subtherapeutic today- see above in Anticoagulation Summary.   Will  instruct Ramy Reyez to take 7.5 mg (1 ½ tablets) tonight, 1/24/25, and tomorrow 1/25/25, and then continue 5 mg daily (1 tablet) until next appointment- see above in Anticoagulation Summary.  2. Follow up in 4 days  3. Patient declines warfarin refills.  4. Verbal and written information provided. Patient expresses understanding and has no further questions at this time.    Swapna Vazquez [facility]

## 2025-01-28 ENCOUNTER — ANTICOAGULATION VISIT (OUTPATIENT)
Dept: PHARMACY | Facility: HOSPITAL | Age: 65
End: 2025-01-28
Payer: MEDICARE

## 2025-01-28 DIAGNOSIS — I48.20 ATRIAL FIBRILLATION, CHRONIC: Primary | ICD-10-CM

## 2025-01-28 LAB
INR PPP: 1.8 (ref 0.91–1.09)
PROTHROMBIN TIME: 22.1 SECONDS (ref 10–13.8)

## 2025-01-28 PROCEDURE — 85610 PROTHROMBIN TIME: CPT

## 2025-01-28 PROCEDURE — G0463 HOSPITAL OUTPT CLINIC VISIT: HCPCS

## 2025-01-28 NOTE — PROGRESS NOTES
Anticoagulation Clinic Progress Note    Anticoagulation Summary  As of 2025      INR goal:  2.0-3.0   TTR:  0.0% (1 d)   INR used for dosin.8 (2025)   Warfarin maintenance plan:  7.5 mg every Tue, Thu, Sat; 5 mg all other days   Weekly warfarin total:  42.5 mg   Plan last modified:  Pete Colon, PharmD (2025)   Next INR check:  2/3/2025   Target end date:  --    Indications    Atrial fibrillation  chronic [I48.20]                 Anticoagulation Episode Summary       INR check location:  --    Preferred lab:  --    Send INR reminders to:   [facility]    Comments:  Last day of xarelto on - start warfarin 5 mg daily and first INR on            Anticoagulation Care Providers       Provider Role Specialty Phone number    Hayden Manzanares PA-C Referring Physician Assistant [phone]            Clinic Interview:  Patient Findings     Positives:  Upcoming dental procedure    Negatives:  Signs/symptoms of thrombosis, Signs/symptoms of bleeding,   Laboratory test error suspected, Change in health, Change in alcohol use,   Change in activity, Upcoming invasive procedure, Emergency department   visit, Missed doses, Extra doses, Change in medications, Change in   diet/appetite, Hospital admission, Bruising, Other complaints    Comments:  Single tooth extraction with root canal scheduled for 25.   Spoke with Nupur at [facility] ([phone]), who   confirmed that warfarin does not need to be withheld prior to this   procedure.       Clinical Outcomes     Negatives:  Major bleeding event, Thromboembolic event,   Anticoagulation-related hospital admission, Anticoagulation-related ED   visit, Anticoagulation-related fatality    Comments:  Single tooth extraction with root canal scheduled for 25.   Spoke with Nupur at [facility] ([phone]), who   confirmed that warfarin does not need to be withheld prior to this   procedure.          INR History:      1/20/2025    10:05 AM 1/24/2025    10:00 AM 1/28/2025     1:15 PM   Anticoagulation Monitoring   INR  1.4 1.8   INR Date  1/24/2025 1/28/2025   INR Goal 2.0-3.0 2.0-3.0 2.0-3.0   Trend   Up   Last Week Total  20 mg 40 mg   Next Week Total  40 mg 42.5 mg   Sun  5 mg 5 mg   Mon  5 mg -   Tue  - 7.5 mg   Wed  - 5 mg   Thu  - 7.5 mg   Fri  7.5 mg (1/24) 5 mg   Sat  7.5 mg (1/25) 7.5 mg       Plan:  1. INR is Subtherapeutic today- see above in Anticoagulation Summary.  Will instruct Ramy Reyez to Increase their warfarin regimen to 7.5 mg Tues/Thurs/Sat, 5 mg all other days - see above in Anticoagulation Summary.  2. Follow up in 6 days (1 day prior to tooth extraction).   3. Patient declines warfarin refills.  4. Verbal and written information provided. Patient expresses understanding and has no further questions at this time.    Pete Colon, PharmD

## 2025-02-03 ENCOUNTER — ANTICOAGULATION VISIT (OUTPATIENT)
Dept: PHARMACY | Facility: HOSPITAL | Age: 65
End: 2025-02-03
Payer: MEDICARE

## 2025-02-03 DIAGNOSIS — I48.20 ATRIAL FIBRILLATION, CHRONIC: Primary | ICD-10-CM

## 2025-02-03 LAB
INR PPP: 2.4 (ref 0.91–1.09)
PROTHROMBIN TIME: 29.2 SECONDS (ref 10–13.8)

## 2025-02-03 PROCEDURE — 85610 PROTHROMBIN TIME: CPT

## 2025-02-03 PROCEDURE — G0463 HOSPITAL OUTPT CLINIC VISIT: HCPCS

## 2025-02-03 RX ORDER — WARFARIN SODIUM 5 MG/1
TABLET ORAL
Qty: 110 TABLET | Refills: 0 | Status: SHIPPED | OUTPATIENT
Start: 2025-02-03

## 2025-02-03 NOTE — PROGRESS NOTES
I have supervised and reviewed the notes, assessments, and/or procedures performed. I personally performed the assessment and implemented the plan. I concur with the documentation of this patient encounter.    Margret Dinh, [facility]

## 2025-02-03 NOTE — PROGRESS NOTES
Anticoagulation Clinic Progress Note    Anticoagulation Summary  As of 2/3/2025      INR goal:  2.0-3.0   TTR:  52.9% (1 wk)   INR used for dosin.4 (2/3/2025)   Warfarin maintenance plan:  7.5 mg every Tue, Thu, Sat; 5 mg all other days   Weekly warfarin total:  42.5 mg   No change documented:  Selena Goodwin   Plan last modified:  Pete Colon, PharmD (2025)   Next INR check:  2025   Target end date:  --    Indications    Atrial fibrillation  chronic [I48.20]                 Anticoagulation Episode Summary       INR check location:  --    Preferred lab:  --    Send INR reminders to:   [facility] [place]    Comments:  Last day of xarelto on - start warfarin 5 mg daily and first INR on            Anticoagulation Care Providers       Provider Role Specialty Phone number    StephanieHayden Gunderson, EVANS Referring Physician Assistant [phone]            Clinic Interview:  Patient Findings     Positives:  Upcoming dental procedure    Negatives:  Signs/symptoms of thrombosis, Signs/symptoms of bleeding,   Laboratory test error suspected, Change in health, Change in alcohol use,   Change in activity, Upcoming invasive procedure, Emergency department   visit, Missed doses, Extra doses, Change in medications, Change in   diet/appetite, Hospital admission, Bruising, Other complaints    Comments:  Dental extraction 25, oral surgeon request INR to be in   range only.      Clinical Outcomes     Negatives:  Major bleeding event, Thromboembolic event,   Anticoagulation-related hospital admission, Anticoagulation-related ED   visit, Anticoagulation-related fatality    Comments:  Dental extraction 25, oral surgeon request INR to be in   range only.        INR History:      2025    10:05 AM 2025    10:00 AM 2025     1:15 PM 2/3/2025     1:15 PM   Anticoagulation Monitoring   INR  1.4 1.8 2.4   INR Date  2025 2025 2/3/2025   INR Goal 2.0-3.0 2.0-3.0  2.0-3.0 2.0-3.0   Trend   Up Same   Last Week Total  20 mg 40 mg 42.5 mg   Next Week Total  40 mg 42.5 mg 42.5 mg   Sun  5 mg 5 mg 5 mg   Mon  5 mg - 5 mg   Tue  - 7.5 mg 7.5 mg   Wed  - 5 mg 5 mg   Thu  - 7.5 mg 7.5 mg   Fri  7.5 mg (1/24) 5 mg 5 mg   Sat  7.5 mg (1/25) 7.5 mg 7.5 mg       Plan:  1. INR is therapeutic today- see above in Anticoagulation Summary.   Will instruct Ramy Reyez to continue their warfarin regimen- see above in Anticoagulation Summary.  2. Follow up in 2 weeks.  3. Patient declines warfarin refills.  4. Verbal and written information provided. Patient expresses understanding and has no further questions at this time.    Selena Goodwin

## 2025-02-04 RX ORDER — DILTIAZEM HCL 60 MG
60 TABLET ORAL 3 TIMES DAILY
Qty: 270 TABLET | Refills: 0 | Status: SHIPPED | OUTPATIENT
Start: 2025-02-04

## 2025-02-05 ENCOUNTER — TELEPHONE (OUTPATIENT)
Age: 65
End: 2025-02-05
Payer: MEDICARE

## 2025-02-05 NOTE — TELEPHONE ENCOUNTER
Received message to call Nupur escalona/ [facility] regarding this pt. No other info left.    Called Nupur back and she was unavailable at the time. Advised to call me back when she's able and be transferred to my ext. [phone].    GRICEL Paul

## 2025-02-17 ENCOUNTER — ANTICOAGULATION VISIT (OUTPATIENT)
Dept: PHARMACY | Facility: HOSPITAL | Age: 65
End: 2025-02-17
Payer: MEDICARE

## 2025-02-17 DIAGNOSIS — I48.20 ATRIAL FIBRILLATION, CHRONIC: Primary | ICD-10-CM

## 2025-02-17 LAB
INR PPP: 1.9 (ref 0.91–1.09)
PROTHROMBIN TIME: 23.1 SECONDS (ref 10–13.8)

## 2025-02-17 PROCEDURE — G0463 HOSPITAL OUTPT CLINIC VISIT: HCPCS

## 2025-02-17 PROCEDURE — 85610 PROTHROMBIN TIME: CPT

## 2025-02-17 RX ORDER — WARFARIN SODIUM 5 MG/1
TABLET ORAL
Qty: 110 TABLET | Refills: 1 | Status: SHIPPED | OUTPATIENT
Start: 2025-02-17 | End: 2025-02-17 | Stop reason: SDUPTHER

## 2025-02-17 RX ORDER — WARFARIN SODIUM 5 MG/1
TABLET ORAL
Qty: 120 TABLET | Refills: 1 | Status: SHIPPED | OUTPATIENT
Start: 2025-02-17

## 2025-02-17 NOTE — PROGRESS NOTES
Anticoagulation Clinic Progress Note    Anticoagulation Summary  As of 2025      INR goal:  2.0-3.0   TTR:  70.5% (3 wk)   INR used for dosin.9 (2025)   Warfarin maintenance plan:  5 mg every Mon, Wed, Fri; 7.5 mg all other days   Weekly warfarin total:  45 mg   Plan last modified:  Pete Colon, PharmD (2025)   Next INR check:  3/7/2025   Target end date:  --    Indications    Atrial fibrillation  chronic [I48.20]                 Anticoagulation Episode Summary       INR check location:  --    Preferred lab:  --    Send INR reminders to:  [facility]    Comments:  Last day of xarelto on - start warfarin 5 mg daily and first INR on            Anticoagulation Care Providers       Provider Role Specialty Phone number    Hayden Manzanares, EVANS Referring Physician Assistant [phone]            Clinic Interview:  Patient Findings     Positives:  Change in medications    Negatives:  Signs/symptoms of thrombosis, Signs/symptoms of bleeding,   Laboratory test error suspected, Change in health, Change in alcohol use,   Change in activity, Upcoming invasive procedure, Emergency department   visit, Upcoming dental procedure, Missed doses, Extra doses, Change in   diet/appetite, Hospital admission, Bruising, Other complaints    Comments:  Reports he started taking a multivitamin ~2 weeks ago (unknown   vitamin k content).       Clinical Outcomes     Negatives:  Major bleeding event, Thromboembolic event,   Anticoagulation-related hospital admission, Anticoagulation-related ED   visit, Anticoagulation-related fatality    Comments:  Reports he started taking a multivitamin ~2 weeks ago (unknown   vitamin k content).         INR History:      2025    10:05 AM 2025    10:00 AM 2025     1:15 PM 2/3/2025     1:15 PM 2025     1:15 PM   Anticoagulation Monitoring   INR  1.4 1.8 2.4 1.9   INR Date  2025 2025 2/3/2025 2025   INR Goal 2.0-3.0 2.0-3.0  2.0-3.0 2.0-3.0 2.0-3.0   Trend   Up Same Up   Last Week Total  20 mg 40 mg 42.5 mg 42.5 mg   Next Week Total  40 mg 42.5 mg 42.5 mg 47.5 mg   Sun  5 mg 5 mg 5 mg 7.5 mg   Mon  5 mg - 5 mg 7.5 mg (2/17); Otherwise 5 mg   Tue  - 7.5 mg 7.5 mg 7.5 mg   Wed  - 5 mg 5 mg 5 mg   Thu  - 7.5 mg 7.5 mg 7.5 mg   Fri  7.5 mg (1/24) 5 mg 5 mg 5 mg   Sat  7.5 mg (1/25) 7.5 mg 7.5 mg 7.5 mg       Plan:  1. INR is Subtherapeutic today- see above in Anticoagulation Summary.  Will instruct Ramy Reyez to Increase their warfarin regimen (7.5 mg today, then increase to 5 mg MWF, 7.5 mg all other days) - see above in Anticoagulation Summary.  2. Follow up in 2 weeks.  3. Patient desires warfarin refills.  4. Verbal and written information provided. Patient expresses understanding and has no further questions at this time.    Pete Colon, PharmD

## 2025-02-28 ENCOUNTER — TELEPHONE (OUTPATIENT)
Facility: HOSPITAL | Age: 65
End: 2025-02-28
Payer: MEDICARE

## 2025-02-28 NOTE — TELEPHONE ENCOUNTER
Called and left voicemail for patient to call our office back to schedule Left LEV with Dr Jace Dave for first available.

## 2025-03-07 ENCOUNTER — ANTICOAGULATION VISIT (OUTPATIENT)
Dept: PHARMACY | Facility: HOSPITAL | Age: 65
End: 2025-03-07
Payer: MEDICARE

## 2025-03-07 ENCOUNTER — OFFICE VISIT (OUTPATIENT)
Age: 65
End: 2025-03-07
Payer: MEDICARE

## 2025-03-07 VITALS
BODY MASS INDEX: 38.36 KG/M2 | HEART RATE: 72 BPM | HEIGHT: 76 IN | SYSTOLIC BLOOD PRESSURE: 134 MMHG | DIASTOLIC BLOOD PRESSURE: 88 MMHG | WEIGHT: 315 LBS

## 2025-03-07 DIAGNOSIS — E78.2 MIXED HYPERLIPIDEMIA: ICD-10-CM

## 2025-03-07 DIAGNOSIS — E66.01 OBESITY, CLASS III, BMI 40-49.9 (MORBID OBESITY): ICD-10-CM

## 2025-03-07 DIAGNOSIS — I10 ESSENTIAL HYPERTENSION: ICD-10-CM

## 2025-03-07 DIAGNOSIS — I48.21 PERMANENT ATRIAL FIBRILLATION: Primary | ICD-10-CM

## 2025-03-07 DIAGNOSIS — I83.891 VARICOSE VEINS OF RIGHT LOWER EXTREMITY WITH COMPLICATIONS: ICD-10-CM

## 2025-03-07 LAB
INR PPP: 2.2 (ref 0.91–1.09)
PROTHROMBIN TIME: 26.1 SECONDS (ref 10–13.8)

## 2025-03-07 PROCEDURE — 85610 PROTHROMBIN TIME: CPT

## 2025-03-07 PROCEDURE — G0463 HOSPITAL OUTPT CLINIC VISIT: HCPCS

## 2025-03-07 RX ORDER — DILTIAZEM HYDROCHLORIDE 180 MG/1
180 CAPSULE, COATED, EXTENDED RELEASE ORAL DAILY
Qty: 90 CAPSULE | Refills: 1 | Status: SHIPPED | OUTPATIENT
Start: 2025-03-07

## 2025-03-07 NOTE — PROGRESS NOTES
ELECTROPHYSIOLOGY   Date of Office Visit: 2025  Patient Name: Ramy Reyez  : 1960  Encounter Provider: Hayden Sellers PA-C  Primary Cardiologist: Dr. Russo  Electrophysiologist: Dr. Valentin  CHIEF COMPLAINT / REASON FOR OFFICE VISIT     chronic AFIB  1 year follow-up    HISTORY OF PRESENT ILLNESS     This is a 65 y.o. year old male who presents to [facility] for a 1 year follow up of cardiovascular health.     He has a history of permanent atrial fibrillation.  He initially had paroxysmal and this was treated with flecainide but then deteriorated to persistent A-fib.    He is currently taking rate control therapy in the form of metoprolol and diltiazem.    Today the patient reports overall he has been doing well.  He does have some fatigue and will take a nap in the afternoons but this is kind of in his routine since he retired 10 months ago.    He has not had any issues with dizziness or lightheadedness.  Sometimes he will forget to take his midday Cardizem and we talked about switching to a long-acting for medication needs.    He follows his A1c with his PCP and it has been pretty well-controlled recently.    He does walk his dog for about a half a mile every night without getting any dyspnea.  Encouraged him to keep an eye on this as well as heart rates at home.    He has been following with vascular surgery for venous insufficiency and varicose veins.  He is going to set up to have a left leg vein ablation in the upcoming future with Dr. Dave.  He had a right leg procedure 10/9/2024.    Warfarin for anticoagulation.  Follows with our anticoagulation clinic.  He previously been unable to afford rivaroxaban.    PMHx: Permanent atrial fibrillation, hypertension, hyperlipidemia, varicose veins of bilateral lower extremities, obstructive sleep apnea, obesity on semaglutide, obstructive sleep apnea    PHYSICAL EXAMINATION     Vital Signs:  /88 (BP  "Location: Right arm, Patient Position: Sitting)   Pulse 72   Ht 193 cm (75.98\")   Wt (!) 150 kg (330 lb)   BMI 40.19 kg/m²   Estimated body mass index is 40.19 kg/m² as calculated from the following:    Height as of this encounter: 193 cm (75.98\").    Weight as of this encounter: 150 kg (330 lb).               Physical Exam  Constitutional:       Appearance: Normal appearance.   HENT:      Head: Normocephalic and atraumatic.   Cardiovascular:      Rate and Rhythm: Normal rate. Rhythm irregular.      Pulses: Normal pulses.      Heart sounds: Normal heart sounds.   Pulmonary:      Effort: Pulmonary effort is normal.      Breath sounds: Normal breath sounds.   Musculoskeletal:      Right lower leg: No edema.      Left lower leg: No edema.   Skin:     General: Skin is warm and dry.   Neurological:      General: No focal deficit present.      Mental Status: He is alert and oriented to person, place, and time.          Cardiac Testing/Results     Cardiac Testing:   - Echo 4/1/2021: EF 41-45%, moderate LVH.  RV cavity moderately dilated.  Left atrial cavity moderate to severely dilated.  RVSP 35 mmHg.  Mild dilation of aortic root and ascending aorta.    Result Review :  The following data was reviewed by: Hayden Sellers PA-C on 03/07/2025:    Lipid Panel          10/3/2024    09:52   Lipid Panel   Total Cholesterol 128    Triglycerides 96    HDL Cholesterol 36    VLDL Cholesterol 18    LDL Cholesterol  74    LDL/HDL Ratio 2.02       Lab Results   Component Value Date     10/03/2024     12/20/2023    K 4.0 10/03/2024    K 4.4 12/20/2023     10/03/2024     12/20/2023    CO2 27.7 10/03/2024    CO2 23 12/20/2023    BUN 14 10/03/2024    BUN 15 12/20/2023    CREATININE 1.21 10/03/2024    CREATININE 1.06 12/20/2023    EGFRIFNONA 61 01/26/2022    EGFRIFNONA 78 07/19/2021    EGFRIFAFRI >60 10/16/2022    EGFRIFAFRI >60 10/14/2022    GLUCOSE 96 10/03/2024    GLUCOSE 101 (H) 12/20/2023    CALCIUM " "9.6 10/03/2024    CALCIUM 9.6 12/20/2023    ALBUMIN 4.3 10/03/2024    ALBUMIN 4.7 12/20/2023    AST 20 10/03/2024    AST 26 12/20/2023    ALT 26 10/03/2024    ALT 24 12/20/2023     Lab Results   Component Value Date    WBC 7.15 10/03/2024    WBC 7.2 12/20/2023    HGB 14.6 10/03/2024    HGB 14.5 12/20/2023    HCT 46.0 10/03/2024    HCT 43.6 12/20/2023    MCV 85.2 10/03/2024    MCV 86 12/20/2023     10/03/2024     12/20/2023     No results found for: \"PROBNP\", \"BNP\"  No results found for: \"CKTOTAL\", \"CKMB\", \"CKMBINDEX\", \"TROPONINI\", \"TROPONINT\"  Lab Results   Component Value Date    TSH 3.780 01/19/2021    TSH 3.940 10/16/2019                 ECG 12 Lead    Date/Time: 3/7/2025 1:18 PM  Performed by: Hayden Manzanares PA-C    Authorized by: Hayden Manzanares PA-C  Comparison: compared with previous ECG from 3/6/2024  Rhythm: atrial fibrillation  Rate: normal  BPM: 72  T flattening: III, aVF, V6, V5 and V4  QRS axis: normal                ASSESSMENT & PLAN       Diagnoses and all orders for this visit:    1. Permanent atrial fibrillation (Primary)  He is in rate controlled atrial fibrillation on EKG today.  This has been longstanding over the last few years and he is asymptomatic  Continue rate control therapy in the form of metoprolol 100 mg twice daily.  For medication he is on going to switch his short acting Cardizem to diltiazem 180 mg daily  He is on warfarin for anticoagulation, denies any bleeding complications  Any new symptoms or he is noticing his heart rate is much higher at home he will contact the office  2. Essential hypertension  Blood pressures been well-controlled at today's visit and other office visits.  Continue current meds  3. Mixed hyperlipidemia  This has been noted in the past.  He is not currently on a statin.  He does have diabetes so this could be considered.  He is getting have his lipids checked with his PCP coming up  4. Varicose veins of right lower extremity with " complications  Following intermittently with vascular surgery.  They did do a vein procedure on his right leg in October.  He is considering doing something in his left leg but is undecided.  He will wear compression socks at home  5. Obesity, Class III, BMI 40-49.9 (morbid obesity)  He is on semaglutide and I encouraged him to continue to stay active      Follow Up:  Return in about 1 year (around 3/7/2026) for Dr. Valentin- Routine.  Patient was given instructions and counseling regarding his condition or for health maintenance advice. Please contact office if worsening symptoms or proceed to ER when appropriate.      Hyaden Sellers PA-C  03/07/25  13:33 EST    MEDICATIONS         Discharge Medications            Accurate as of March 7, 2025  1:33 PM. If you have any questions, ask your nurse or doctor.                New Medications        Instructions Start Date   dilTIAZem  MG 24 hr capsule  Commonly known as: Cardizem CD  Started by: Hayden Sellers   180 mg, Oral, Daily             Continue These Medications        Instructions Start Date   ARIPiprazole 5 MG tablet  Commonly known as: ABILIFY   2.5 mg, Daily      cholecalciferol 10 MCG (400 UNIT) tablet  Commonly known as: VITAMIN D3   400 Units, Daily      cyclobenzaprine 10 MG tablet  Commonly known as: FLEXERIL   10 mg, Oral, 3 Times Daily PRN, for muscle spams      escitalopram 20 MG tablet  Commonly known as: LEXAPRO   20 mg, Daily      LORazepam 1 MG tablet  Commonly known as: ATIVAN   1 mg, As Needed      metaxalone 800 MG tablet  Commonly known as: SKELAXIN   TAKE 1 TABLET BY MOUTH THREE TIMES DAILY AS NEEDED      metFORMIN 1000 MG tablet  Commonly known as: GLUCOPHAGE   1,000 mg, Oral, Daily With Breakfast      methylphenidate 20 MG tablet  Commonly known as: RITALIN   20 mg, 3 Times Daily      metoprolol tartrate 100 MG tablet  Commonly known as: LOPRESSOR   TAKE 1 TABLET BY MOUTH TWICE DAILY      Multivitamin Adults 50+ tablet  tablet  Generic drug: multivitamin with minerals   1 tablet, Daily      Ozempic (2 MG/DOSE) 8 MG/3ML solution pen-injector  Generic drug: Semaglutide (2 MG/DOSE)   2 mg, Subcutaneous, Weekly      tamsulosin 0.4 MG capsule 24 hr capsule  Commonly known as: FLOMAX   0.4 mg, Oral, Daily      traZODone 100 MG tablet  Commonly known as: DESYREL   TAKE 2 TABLET BY MOUTH EVERYDAY AT BEDTIME      warfarin 5 MG tablet  Commonly known as: COUMADIN   Take one tablet (5 mg) by mouth Mon, Wed, and Fri, and take one and one-half tablets (7.5 mg) by mouth all other days or as directed.                   **Dragon Disclaimer: This note was dictated using an electronic transcription. The electronic translation of spoken language may permit erroneous, or at times, nonsensical words or phrases to be inadvertently transcribed. Although I have reviewed the note for such errors, some may still exist.

## 2025-03-07 NOTE — PROGRESS NOTES
Anticoagulation Clinic Progress Note    Anticoagulation Summary  As of 3/7/2025      INR goal:  2.0-3.0   TTR:  68.7% (1.3 mo)   INR used for dosin.2 (3/7/2025)   Warfarin maintenance plan:  5 mg every Mon, Wed, Fri; 7.5 mg all other days   Weekly warfarin total:  45 mg   No change documented:  Pete Colon PharmD   Plan last modified:  Pete Colon PharmD (2025)   Next INR check:  3/21/2025   Target end date:  --    Indications    Permanent atrial fibrillation [I48.21]                 Anticoagulation Episode Summary       INR check location:  --    Preferred lab:  --    Send INR reminders to:   [facility] [place]    Comments:  Last day of xarelto on - start warfarin 5 mg daily and first INR on            Anticoagulation Care Providers       Provider Role Specialty Phone number    StephanieHayden Gunderson, EVANS Referring Physician Assistant [phone]            Clinic Interview:  Patient Findings     Negatives:  Signs/symptoms of thrombosis, Signs/symptoms of bleeding,   Laboratory test error suspected, Change in health, Change in alcohol use,   Change in activity, Upcoming invasive procedure, Emergency department   visit, Upcoming dental procedure, Missed doses, Extra doses, Change in   medications, Change in diet/appetite, Hospital admission, Bruising, Other   complaints      Clinical Outcomes     Negatives:  Major bleeding event, Thromboembolic event,   Anticoagulation-related hospital admission, Anticoagulation-related ED   visit, Anticoagulation-related fatality        INR History:      2025    10:05 AM 2025    10:00 AM 2025     1:15 PM 2/3/2025     1:15 PM 2025     1:15 PM 3/7/2025     2:00 PM   Anticoagulation Monitoring   INR  1.4 1.8 2.4 1.9 2.2   INR Date  2025 2025 2/3/2025 2025 3/7/2025   INR Goal 2.0-3.0 2.0-3.0 2.0-3.0 2.0-3.0 2.0-3.0 2.0-3.0   Trend   Up Same Up Same   Last Week Total  20 mg 40 mg 42.5 mg 42.5 mg 45 mg   Next Week Total   40 mg 42.5 mg 42.5 mg 47.5 mg 45 mg   Sun  5 mg 5 mg 5 mg 7.5 mg 7.5 mg   Mon  5 mg - 5 mg 7.5 mg (2/17); Otherwise 5 mg 5 mg   Tue  - 7.5 mg 7.5 mg 7.5 mg 7.5 mg   Wed  - 5 mg 5 mg 5 mg 5 mg   Thu  - 7.5 mg 7.5 mg 7.5 mg 7.5 mg   Fri  7.5 mg (1/24) 5 mg 5 mg 5 mg 5 mg   Sat  7.5 mg (1/25) 7.5 mg 7.5 mg 7.5 mg 7.5 mg   Visit Report      Report       Plan:  1. INR is Therapeutic today- see above in Anticoagulation Summary.  Will instruct Ramy Reyez to Continue their warfarin regimen- see above in Anticoagulation Summary.  2. Follow up in 2 weeks.  3. Patient declines warfarin refills.  4. Verbal and written information provided. Patient expresses understanding and has no further questions at this time.    Pete Colon, PharmD

## 2025-03-21 ENCOUNTER — ANTICOAGULATION VISIT (OUTPATIENT)
Dept: PHARMACY | Facility: HOSPITAL | Age: 65
End: 2025-03-21
Payer: MEDICARE

## 2025-03-21 DIAGNOSIS — I48.21 PERMANENT ATRIAL FIBRILLATION: Primary | ICD-10-CM

## 2025-03-21 LAB
INR PPP: 2.6 (ref 0.91–1.09)
PROTHROMBIN TIME: 31.7 SECONDS (ref 10–13.8)

## 2025-03-21 PROCEDURE — 85610 PROTHROMBIN TIME: CPT

## 2025-03-21 PROCEDURE — G0463 HOSPITAL OUTPT CLINIC VISIT: HCPCS

## 2025-03-21 NOTE — PROGRESS NOTES
Anticoagulation Clinic Progress Note    Anticoagulation Summary  As of 3/21/2025      INR goal:  2.0-3.0   TTR:  76.9% (1.8 mo)   INR used for dosin.6 (3/21/2025)   Warfarin maintenance plan:  5 mg every Mon, Wed, Fri; 7.5 mg all other days   Weekly warfarin total:  45 mg   No change documented:  Presley Mario, Pharmacy Intern   Plan last modified:  Pete Colon, PharmD (2025)   Next INR check:  2025   Target end date:  --    Indications    Permanent atrial fibrillation [I48.21]                 Anticoagulation Episode Summary       INR check location:  --    Preferred lab:  --    Send INR reminders to:   [facility] [place]    Comments:  Last day of xarelto on - start warfarin 5 mg daily and first INR on            Anticoagulation Care Providers       Provider Role Specialty Phone number    Hayden Manzanares, EVANS Referring Physician Assistant [phone]            Clinic Interview:  Patient Findings     Negatives:  Signs/symptoms of thrombosis, Signs/symptoms of bleeding,   Laboratory test error suspected, Change in health, Change in alcohol use,   Change in activity, Upcoming invasive procedure, Emergency department   visit, Upcoming dental procedure, Missed doses, Extra doses, Change in   medications, Change in diet/appetite, Hospital admission, Bruising, Other   complaints      Clinical Outcomes     Negatives:  Major bleeding event, Thromboembolic event,   Anticoagulation-related hospital admission, Anticoagulation-related ED   visit, Anticoagulation-related fatality        INR History:      2025    10:05 AM 2025    10:00 AM 2025     1:15 PM 2/3/2025     1:15 PM 2025     1:15 PM 3/7/2025     2:00 PM 3/21/2025     2:15 PM   Anticoagulation Monitoring   INR  1.4 1.8 2.4 1.9 2.2 2.6   INR Date  2025 2025 2/3/2025 2025 3/7/2025 3/21/2025   INR Goal 2.0-3.0 2.0-3.0 2.0-3.0 2.0-3.0 2.0-3.0 2.0-3.0 2.0-3.0   Trend   Up Same Up Same Same    Last Week Total  20 mg 40 mg 42.5 mg 42.5 mg 45 mg 45 mg   Next Week Total  40 mg 42.5 mg 42.5 mg 47.5 mg 45 mg 45 mg   Sun  5 mg 5 mg 5 mg 7.5 mg 7.5 mg 7.5 mg   Mon  5 mg - 5 mg 7.5 mg (2/17); Otherwise 5 mg 5 mg 5 mg   Tue  - 7.5 mg 7.5 mg 7.5 mg 7.5 mg 7.5 mg   Wed  - 5 mg 5 mg 5 mg 5 mg 5 mg   Thu  - 7.5 mg 7.5 mg 7.5 mg 7.5 mg 7.5 mg   Fri  7.5 mg (1/24) 5 mg 5 mg 5 mg 5 mg 5 mg   Sat  7.5 mg (1/25) 7.5 mg 7.5 mg 7.5 mg 7.5 mg 7.5 mg   Visit Report      Report        Plan:  1. INR is Therapeutic today- see above in Anticoagulation Summary.  Will instruct Ramy Reyez to Continue their warfarin regimen (5 mg MoWeFr, 7.5 mg AOD) - see above in Anticoagulation Summary.  2. Follow up in 4 weeks  3. Patient desires warfarin refills.  4. Verbal and written information provided. Patient expresses understanding and has no further questions at this time.    Nicole [place], Pharmacy Intern

## 2025-03-21 NOTE — PROGRESS NOTES
I have supervised and reviewed the notes, assessments, and/or procedures performed. The documented assessment and plan were developed cooperatively, and the plan was implemented in my presence. I concur with the documentation of this patient encounter.    Margret Dinh, [facility]

## 2025-03-26 DIAGNOSIS — R35.0 URINARY FREQUENCY: ICD-10-CM

## 2025-03-26 NOTE — TELEPHONE ENCOUNTER
Rx Refill Note  Requested Prescriptions     Pending Prescriptions Disp Refills    tamsulosin (FLOMAX) 0.4 MG capsule 24 hr capsule [Pharmacy Med Name: TAMSULOSIN 0.4MG CAPSULES] 90 capsule 1     Sig: TAKE 1 CAPSULE BY MOUTH DAILY      Last office visit with prescribing clinician: 10/29/2024   Last telemedicine visit with prescribing clinician: Visit date not found   Next office visit with prescribing clinician: Visit date not found                         Would you like a call back once the refill request has been completed: [] Yes [] No    If the office needs to give you a call back, can they leave a voicemail: [] Yes [] No    Rosa Billings MA  03/26/25, 08:21 EDT

## 2025-03-27 RX ORDER — TAMSULOSIN HYDROCHLORIDE 0.4 MG/1
1 CAPSULE ORAL DAILY
Qty: 90 CAPSULE | Refills: 0 | Status: SHIPPED | OUTPATIENT
Start: 2025-03-27

## 2025-03-28 NOTE — TELEPHONE ENCOUNTER
Hub relay: Geom for pt to call back to [facility] for a 6 month follow up on April

## 2025-04-14 ENCOUNTER — OFFICE VISIT (OUTPATIENT)
Dept: FAMILY MEDICINE CLINIC | Facility: CLINIC | Age: 65
End: 2025-04-14
Payer: MEDICARE

## 2025-04-14 VITALS
WEIGHT: 314.8 LBS | TEMPERATURE: 97 F | DIASTOLIC BLOOD PRESSURE: 80 MMHG | RESPIRATION RATE: 14 BRPM | HEART RATE: 77 BPM | BODY MASS INDEX: 38.33 KG/M2 | SYSTOLIC BLOOD PRESSURE: 118 MMHG | HEIGHT: 76 IN | OXYGEN SATURATION: 98 %

## 2025-04-14 DIAGNOSIS — E78.2 MIXED HYPERLIPIDEMIA: ICD-10-CM

## 2025-04-14 DIAGNOSIS — I10 ESSENTIAL HYPERTENSION: ICD-10-CM

## 2025-04-14 DIAGNOSIS — N32.81 OAB (OVERACTIVE BLADDER): ICD-10-CM

## 2025-04-14 DIAGNOSIS — Z79.01 ANTICOAGULATED BY ANTICOAGULATION TREATMENT: ICD-10-CM

## 2025-04-14 DIAGNOSIS — E11.9 DIABETES MELLITUS WITHOUT COMPLICATION: Primary | ICD-10-CM

## 2025-04-14 NOTE — PROGRESS NOTES
Chief Complaint  Diabetes    Subjective        Ramy Reyez presents to [facility]  History of Present Illness    History of Present Illness  The patient presents for a routine checkup.    He reports no changes in his medication regimen since the last consultation. He has discontinued Skelaxin following his custodial. He is currently on Coumadin and reports no adverse effects such as bleeding, bruising, or blood in stool. He is also on semaglutide (Ozempic), which has resulted in weight loss of 15 pounds since October 2024, with no reported side effects. He reports that semaglutide continues to suppress his appetite. He maintains adequate hydration and reports no gastrointestinal issues such as constipation, abdominal discomfort, or acid reflux. His metformin therapy is well-tolerated. His mental health medications remain unchanged. He reports no hematuria or dysphagia. He does not require any medication refills at this time and has not encountered any difficulties in obtaining his medications.    He has been experiencing urinary issues and has an upcoming appointment with a urologist for further evaluation. He is scheduled for a test with the urologist at the surgery center this Friday, where they will insert a wire to assess its effectiveness. If it proves effective, they will proceed with placing a stimulator system.    His atrial fibrillation remains asymptomatic.    He experiences mild dyspnea during physical exertion, such as climbing stairs. He expresses interest in incorporating exercise into his routine and prefers walking as a form of physical activity.    He has a history of hearing impairment and would like to have his left ears flushed during this visit.    His diabetic foot exam was normal. He has some calluses on his feet and will monitor them daily.    His prostate cancer screenings are being conducted by the urology department. His last colonoscopy was performed in  "2023, with the next one due in 2026.    MEDICATIONS  Current       Objective   Vital Signs:  /80   Pulse 77   Temp 97 °F (36.1 °C) (Infrared)   Resp 14   Ht 193 cm (75.98\")   Wt (!) 143 kg (314 lb 12.8 oz)   SpO2 98%   BMI 38.34 kg/m²   Estimated body mass index is 38.34 kg/m² as calculated from the following:    Height as of this encounter: 193 cm (75.98\").    Weight as of this encounter: 143 kg (314 lb 12.8 oz).               Physical Exam  Vitals and nursing note reviewed.   Constitutional:       General: He is not in acute distress.     Appearance: He is well-developed. He is not ill-appearing or diaphoretic.   HENT:      Head: Normocephalic and atraumatic.      Right Ear: Tympanic membrane, ear canal and external ear normal.      Left Ear: There is impacted cerumen.      Mouth/Throat:      Mouth: Mucous membranes are moist.      Pharynx: No posterior oropharyngeal erythema.   Eyes:      General: No scleral icterus.        Right eye: No discharge.         Left eye: No discharge.      Conjunctiva/sclera: Conjunctivae normal.   Cardiovascular:      Rate and Rhythm: Normal rate. Rhythm irregularly irregular.      Heart sounds: Normal heart sounds.   Pulmonary:      Effort: Pulmonary effort is normal.      Breath sounds: Normal breath sounds.   Abdominal:      General: Bowel sounds are normal. There is no distension.      Palpations: Abdomen is soft.      Tenderness: There is no abdominal tenderness.   Musculoskeletal:         General: No deformity.      Right foot: No deformity.      Left foot: No deformity.      Comments: Gait smooth and steady   Feet:      Right foot:      Protective Sensation: 10 sites tested.  10 sites sensed.      Skin integrity: Callus present.      Toenail Condition: Right toenails are normal.      Left foot:      Protective Sensation: 10 sites tested.  10 sites sensed.      Skin integrity: Callus present.      Toenail Condition: Left toenails are normal.   Skin:     General: " Skin is warm and dry.   Neurological:      Mental Status: He is alert and oriented to person, place, and time.   Psychiatric:         Mood and Affect: Mood normal.         Behavior: Behavior normal.          Physical Exam      Vital Signs  Blood pressure is normal.     Result Review :            Results                  Assessment and Plan     Diagnoses and all orders for this visit:    1. Diabetes mellitus without complication (Primary)  -     Hemoglobin A1c  -     Comprehensive Metabolic Panel  -     Microalbumin / Creatinine Urine Ratio - Urine, Clean Catch    2. OAB (overactive bladder)    3. Essential hypertension  -     Comprehensive Metabolic Panel  -     Microalbumin / Creatinine Urine Ratio - Urine, Clean Catch    4. Mixed hyperlipidemia  -     Lipid Panel With LDL / HDL Ratio    5. Anticoagulated by anticoagulation treatment        Assessment & Plan  1. Routine checkup.  He has experienced a weight loss of 15 pounds since October 2024. His blood pressure readings are within the normal range. He is advised to maintain adequate hydration and abstain from food intake post-midnight prior to his lab work. He is encouraged to engage in regular physical activity, specifically walking, to prevent muscle mass loss associated with weight reduction. A comprehensive panel of laboratory tests will be ordered.    2. Urinary issues.  He reports ongoing OAB urinary issues and has a test scheduled with the urologist at the surgery center this Friday. If the test is successful, a stimulator system will be considered.    3. Atrial fibrillation/hypertension  His atrial fibrillation remains asymptomatic. He is currently on Coumadin and reports no extra bleeding, bruising, or blood in his stool.  Blood pressure is stable.    4. Hearing impairment.  He has had trouble hearing for years and reports that his left ear is full of wax. An ear irrigation procedure performed today to address the cerumen impaction in his left ear. Pt  tolerated well.  Lavage per MA.     5.  Type 2 diabetes   diabetic foot care discussed today.  Check A1c and adjustments to follow as needed.  Doing well on Ozempic.  He continues to gradually lose weight.      6. Health maintenance.  His prostate cancer screenings are being conducted by the urology department. His last colonoscopy was performed in 2023, with the next one due in 2026.    7.  Hyperlipidemia  Patient will return for fasting labs.  Not on statin.      Follow-up  The patient is scheduled for a follow-up visit in 6 months, or earlier if any complications arise.    PROCEDURE  Colonoscopy was performed in 2023.            Follow Up     No follow-ups on file.  Patient was given instructions and counseling regarding his condition or for health maintenance advice. Please see specific information pulled into the AVS if appropriate.    Patient or patient representative verbalized consent for the use of Ambient Listening during the visit with  HARDY Ortega for chart documentation. 4/14/2025  15:05 EDT

## 2025-04-21 ENCOUNTER — ANTICOAGULATION VISIT (OUTPATIENT)
Dept: PHARMACY | Facility: HOSPITAL | Age: 65
End: 2025-04-21
Payer: MEDICARE

## 2025-04-21 DIAGNOSIS — I48.21 PERMANENT ATRIAL FIBRILLATION: Primary | ICD-10-CM

## 2025-04-21 LAB
INR PPP: 1.6 (ref 0.91–1.09)
PROTHROMBIN TIME: 19.2 SECONDS (ref 10–13.8)

## 2025-04-21 PROCEDURE — G0463 HOSPITAL OUTPT CLINIC VISIT: HCPCS

## 2025-04-21 PROCEDURE — 85610 PROTHROMBIN TIME: CPT

## 2025-04-21 NOTE — PROGRESS NOTES
Anticoagulation Clinic Progress Note    Anticoagulation Summary  As of 2025      INR goal:  2.0-3.0   TTR:  70.7% (2.8 mo)   INR used for dosin.6 (2025)   Warfarin maintenance plan:  5 mg every Mon, Wed, Fri; 7.5 mg all other days   Weekly warfarin total:  45 mg   Plan last modified:  Pete Colon, PharmD (2025)   Next INR check:  2025   Target end date:  --    Indications    Permanent atrial fibrillation [I48.21]                 Anticoagulation Episode Summary       INR check location:  --    Preferred lab:  --    Send INR reminders to:   [facility] [place]    Comments:  Last day of xarelto on - start warfarin 5 mg daily and first INR on            Anticoagulation Care Providers       Provider Role Specialty Phone number    Hayden Manzanares, EVANS Referring Physician Assistant [phone]            Clinic Interview:  Patient Findings     Negatives:  Signs/symptoms of thrombosis, Signs/symptoms of bleeding,   Laboratory test error suspected, Change in health, Change in alcohol use,   Change in activity, Upcoming invasive procedure, Emergency department   visit, Upcoming dental procedure, Missed doses, Extra doses, Change in   medications, Change in diet/appetite, Hospital admission, Bruising, Other   complaints    Comments:  Patient reports no changes in diet or medications. He may have   missed a dose but uncertain.      Clinical Outcomes     Negatives:  Major bleeding event, Thromboembolic event,   Anticoagulation-related hospital admission, Anticoagulation-related ED   visit, Anticoagulation-related fatality    Comments:  Patient reports no changes in diet or medications. He may have   missed a dose but uncertain.        INR History:      2025    10:00 AM 2025     1:15 PM 2/3/2025     1:15 PM 2025     1:15 PM 3/7/2025     2:00 PM 3/21/2025     2:15 PM 2025     2:15 PM   Anticoagulation Monitoring   INR 1.4 1.8 2.4 1.9 2.2 2.6 1.6   INR Date  1/24/2025 1/28/2025 2/3/2025 2/17/2025 3/7/2025 3/21/2025 4/21/2025   INR Goal 2.0-3.0 2.0-3.0 2.0-3.0 2.0-3.0 2.0-3.0 2.0-3.0 2.0-3.0   Trend  Up Same Up Same Same Same   Last Week Total 20 mg 40 mg 42.5 mg 42.5 mg 45 mg 45 mg 45 mg   Next Week Total 40 mg 42.5 mg 42.5 mg 47.5 mg 45 mg 45 mg 47.5 mg   Sun 5 mg 5 mg 5 mg 7.5 mg 7.5 mg 7.5 mg 7.5 mg   Mon 5 mg - 5 mg 7.5 mg (2/17); Otherwise 5 mg 5 mg 5 mg 5 mg   Tue - 7.5 mg 7.5 mg 7.5 mg 7.5 mg 7.5 mg 10 mg (4/22); Otherwise 7.5 mg   Wed - 5 mg 5 mg 5 mg 5 mg 5 mg 5 mg   Thu - 7.5 mg 7.5 mg 7.5 mg 7.5 mg 7.5 mg 7.5 mg   Fri 7.5 mg (1/24) 5 mg 5 mg 5 mg 5 mg 5 mg 5 mg   Sat 7.5 mg (1/25) 7.5 mg 7.5 mg 7.5 mg 7.5 mg 7.5 mg 7.5 mg   Visit Report     Report         Plan:  1. INR is Subtherapeutic today- see above in Anticoagulation Summary.  Will instruct Ramy Reyez to Change their warfarin regimen- see above in Anticoagulation Summary.  2. Follow up in 2 weeks  3. Patient declines warfarin refills.  4. Verbal and written information provided. Patient expresses understanding and has no further questions at this time.    Rose Llamas [facility]

## 2025-04-23 DIAGNOSIS — E11.9 DIABETES MELLITUS WITHOUT COMPLICATION: ICD-10-CM

## 2025-04-23 NOTE — TELEPHONE ENCOUNTER
Rx Refill Note  Requested Prescriptions     Pending Prescriptions Disp Refills    metFORMIN (GLUCOPHAGE) 1000 MG tablet [Pharmacy Med Name: METFORMIN 1000MG TABLETS] 90 tablet 1     Sig: TAKE 1 TABLET BY MOUTH DAILY WITH BREAKFAST      Last office visit with prescribing clinician: 4/14/2025   Last telemedicine visit with prescribing clinician: Visit date not found   Next office visit with prescribing clinician: Visit date not found                         Would you like a call back once the refill request has been completed: [] Yes [] No    If the office needs to give you a call back, can they leave a voicemail: [] Yes [] No    Mario Alberto Woodard Rep  04/23/25, 09:40 EDT

## 2025-04-24 ENCOUNTER — LAB (OUTPATIENT)
Dept: LAB | Facility: HOSPITAL | Age: 65
End: 2025-04-24
Payer: MEDICARE

## 2025-04-24 LAB
ALBUMIN SERPL-MCNC: 4.4 G/DL (ref 3.5–5.2)
ALBUMIN UR-MCNC: 9.4 MG/DL
ALBUMIN/GLOB SERPL: 1.6 G/DL
ALP SERPL-CCNC: 99 U/L (ref 39–117)
ALT SERPL W P-5'-P-CCNC: 20 U/L (ref 1–41)
ANION GAP SERPL CALCULATED.3IONS-SCNC: 12 MMOL/L (ref 5–15)
AST SERPL-CCNC: 24 U/L (ref 1–40)
BILIRUB SERPL-MCNC: 1.2 MG/DL (ref 0–1.2)
BUN SERPL-MCNC: 10 MG/DL (ref 8–23)
BUN/CREAT SERPL: 7.1 (ref 7–25)
CALCIUM SPEC-SCNC: 9.6 MG/DL (ref 8.6–10.5)
CHLORIDE SERPL-SCNC: 101 MMOL/L (ref 98–107)
CHOLEST SERPL-MCNC: 132 MG/DL (ref 0–200)
CO2 SERPL-SCNC: 27 MMOL/L (ref 22–29)
CREAT SERPL-MCNC: 1.4 MG/DL (ref 0.76–1.27)
CREAT UR-MCNC: 144.7 MG/DL
EGFRCR SERPLBLD CKD-EPI 2021: 55.4 ML/MIN/1.73
GLOBULIN UR ELPH-MCNC: 2.7 GM/DL
GLUCOSE SERPL-MCNC: 106 MG/DL (ref 65–99)
HBA1C MFR BLD: 5.8 % (ref 4.8–5.6)
HDLC SERPL-MCNC: 36 MG/DL (ref 40–60)
LDLC SERPL CALC-MCNC: 74 MG/DL (ref 0–100)
LDLC/HDLC SERPL: 2.01 {RATIO}
MICROALBUMIN/CREAT UR: 65 MG/G (ref 0–29)
POTASSIUM SERPL-SCNC: 4.3 MMOL/L (ref 3.5–5.2)
PROT SERPL-MCNC: 7.1 G/DL (ref 6–8.5)
SODIUM SERPL-SCNC: 140 MMOL/L (ref 136–145)
TRIGL SERPL-MCNC: 119 MG/DL (ref 0–150)
VLDLC SERPL-MCNC: 22 MG/DL (ref 5–40)

## 2025-04-24 PROCEDURE — 80061 LIPID PANEL: CPT | Performed by: NURSE PRACTITIONER

## 2025-04-24 PROCEDURE — 83036 HEMOGLOBIN GLYCOSYLATED A1C: CPT | Performed by: NURSE PRACTITIONER

## 2025-04-24 PROCEDURE — 82043 UR ALBUMIN QUANTITATIVE: CPT | Performed by: NURSE PRACTITIONER

## 2025-04-24 PROCEDURE — 80053 COMPREHEN METABOLIC PANEL: CPT | Performed by: NURSE PRACTITIONER

## 2025-04-24 PROCEDURE — 82570 ASSAY OF URINE CREATININE: CPT | Performed by: NURSE PRACTITIONER

## 2025-04-29 ENCOUNTER — TELEPHONE (OUTPATIENT)
Dept: FAMILY MEDICINE CLINIC | Facility: CLINIC | Age: 65
End: 2025-04-29
Payer: COMMERCIAL

## 2025-04-29 NOTE — TELEPHONE ENCOUNTER
"Relay     \"Please contact patient and discuss results and help him to get scheduled to see me next week--SW     Urine protein is elevated from previous and your kidney function has decreased--please avoid any NSAIDs and schedule a follow-up with me next week  Cholesterol looks okay  A1c is stable prediabetes\"  "

## 2025-04-30 NOTE — TELEPHONE ENCOUNTER
Name: Ramy Reyez      Relationship: Self      Best Callback Number:       [facility] PROVIDED THE RELAY MESSAGE FROM THE OFFICE      PATIENT: SCHEDULED PER NOTE    ADDITIONAL INFORMATION:

## 2025-05-01 ENCOUNTER — PATIENT MESSAGE (OUTPATIENT)
Dept: FAMILY MEDICINE CLINIC | Facility: CLINIC | Age: 65
End: 2025-05-01
Payer: COMMERCIAL

## 2025-05-01 NOTE — TELEPHONE ENCOUNTER
Called patient and spoke with patient about he would need to be seen in the office before we can send in the prescription cough medicine sent in. Patient is going to see how today goes and then will give us a call if the patient doesn't feel any better.

## 2025-05-05 ENCOUNTER — ANTICOAGULATION VISIT (OUTPATIENT)
Dept: PHARMACY | Facility: HOSPITAL | Age: 65
End: 2025-05-05
Payer: MEDICARE

## 2025-05-05 DIAGNOSIS — I48.21 PERMANENT ATRIAL FIBRILLATION: Primary | ICD-10-CM

## 2025-05-05 LAB
INR PPP: 3.2 (ref 0.91–1.09)
PROTHROMBIN TIME: 38.5 SECONDS (ref 10–13.8)

## 2025-05-05 PROCEDURE — 85610 PROTHROMBIN TIME: CPT

## 2025-05-05 PROCEDURE — G0463 HOSPITAL OUTPT CLINIC VISIT: HCPCS

## 2025-05-05 NOTE — PROGRESS NOTES
I have supervised and reviewed the notes, assessments, and/or procedures performed. The documented assessment and plan were developed cooperatively, and the plan was implemented in my presence. I concur with the documentation of this patient encounter.    Margret Dinh, [facility]

## 2025-05-05 NOTE — PROGRESS NOTES
Anticoagulation Clinic Progress Note    Anticoagulation Summary  As of 5/5/2025      INR goal:  2.0-3.0   TTR:  69.6% (3.3 mo)   INR used for dosing:  3.2 (5/5/2025)   Warfarin maintenance plan:  5 mg every Mon, Wed, Fri; 7.5 mg all other days   Weekly warfarin total:  45 mg   No change documented:  Dayanara Waite, Pharmacy Intern   Plan last modified:  Pete Colon, PharmD (2/17/2025)   Next INR check:  5/19/2025   Target end date:  --    Indications    Permanent atrial fibrillation [I48.21]                 Anticoagulation Episode Summary       INR check location:  --    Preferred lab:  --    Send INR reminders to:   [facility] [place]    Comments:  Last day of xarelto on 1/19- start warfarin 5 mg daily and first INR on 1/24           Anticoagulation Care Providers       Provider Role Specialty Phone number    StephanieHayden Gunderson, EVANS Referring Physician Assistant [phone]            Clinic Interview:  Patient Findings     Positives:  Other complaints    Negatives:  Signs/symptoms of thrombosis, Signs/symptoms of bleeding,   Laboratory test error suspected, Change in health, Change in alcohol use,   Change in activity, Upcoming invasive procedure, Emergency department   visit, Upcoming dental procedure, Missed doses, Extra doses, Change in   medications, Change in diet/appetite, Hospital admission, Bruising    Comments:  Pt has had cough and chest congestion for about a week, took   some Delsym      Clinical Outcomes     Negatives:  Major bleeding event, Thromboembolic event,   Anticoagulation-related hospital admission, Anticoagulation-related ED   visit, Anticoagulation-related fatality    Comments:  Pt has had cough and chest congestion for about a week, took   some Delsym        INR History:      1/28/2025     1:15 PM 2/3/2025     1:15 PM 2/17/2025     1:15 PM 3/7/2025     2:00 PM 3/21/2025     2:15 PM 4/21/2025     2:15 PM 5/5/2025     3:00 PM   Anticoagulation Monitoring   INR 1.8 2.4 1.9 2.2  2.6 1.6 3.2   INR Date 1/28/2025 2/3/2025 2/17/2025 3/7/2025 3/21/2025 4/21/2025 5/5/2025   INR Goal 2.0-3.0 2.0-3.0 2.0-3.0 2.0-3.0 2.0-3.0 2.0-3.0 2.0-3.0   Trend Up Same Up Same Same Same Same   Last Week Total 40 mg 42.5 mg 42.5 mg 45 mg 45 mg 45 mg 45 mg   Next Week Total 42.5 mg 42.5 mg 47.5 mg 45 mg 45 mg 47.5 mg 45 mg   Sun 5 mg 5 mg 7.5 mg 7.5 mg 7.5 mg 7.5 mg 7.5 mg   Mon - 5 mg 7.5 mg (2/17); Otherwise 5 mg 5 mg 5 mg 5 mg 5 mg   Tue 7.5 mg 7.5 mg 7.5 mg 7.5 mg 7.5 mg 10 mg (4/22); Otherwise 7.5 mg 7.5 mg   Wed 5 mg 5 mg 5 mg 5 mg 5 mg 5 mg 5 mg   Thu 7.5 mg 7.5 mg 7.5 mg 7.5 mg 7.5 mg 7.5 mg 7.5 mg   Fri 5 mg 5 mg 5 mg 5 mg 5 mg 5 mg 5 mg   Sat 7.5 mg 7.5 mg 7.5 mg 7.5 mg 7.5 mg 7.5 mg 7.5 mg   Visit Report    Report          Plan:  1. INR is Supratherapeutic today- see above in Anticoagulation Summary.   Will instruct Ramy Reyez to Continue their warfarin regimen- see above in Anticoagulation Summary.  2. Follow up in 2 weeks  3. They have been instructed to call if any changes in medications, doses, concerns, etc. Patient expresses understanding and has no further questions at this time.    Dayanara Waite, Pharmacy Intern

## 2025-05-13 ENCOUNTER — OFFICE VISIT (OUTPATIENT)
Dept: FAMILY MEDICINE CLINIC | Facility: CLINIC | Age: 65
End: 2025-05-13
Payer: MEDICARE

## 2025-05-13 VITALS
DIASTOLIC BLOOD PRESSURE: 82 MMHG | RESPIRATION RATE: 14 BRPM | HEIGHT: 76 IN | BODY MASS INDEX: 38.27 KG/M2 | WEIGHT: 314.3 LBS | SYSTOLIC BLOOD PRESSURE: 116 MMHG | TEMPERATURE: 97 F | HEART RATE: 86 BPM | OXYGEN SATURATION: 96 %

## 2025-05-13 DIAGNOSIS — R80.9 PROTEINURIA, UNSPECIFIED TYPE: ICD-10-CM

## 2025-05-13 DIAGNOSIS — R79.89 ELEVATED SERUM CREATININE: Primary | ICD-10-CM

## 2025-05-13 DIAGNOSIS — Z79.01 ANTICOAGULATED BY ANTICOAGULATION TREATMENT: ICD-10-CM

## 2025-05-13 DIAGNOSIS — E11.9 DIABETES MELLITUS WITHOUT COMPLICATION: ICD-10-CM

## 2025-05-13 PROCEDURE — 1159F MED LIST DOCD IN RCRD: CPT | Performed by: NURSE PRACTITIONER

## 2025-05-13 PROCEDURE — 3044F HG A1C LEVEL LT 7.0%: CPT | Performed by: NURSE PRACTITIONER

## 2025-05-13 PROCEDURE — G2211 COMPLEX E/M VISIT ADD ON: HCPCS | Performed by: NURSE PRACTITIONER

## 2025-05-13 PROCEDURE — 99213 OFFICE O/P EST LOW 20 MIN: CPT | Performed by: NURSE PRACTITIONER

## 2025-05-13 PROCEDURE — 3074F SYST BP LT 130 MM HG: CPT | Performed by: NURSE PRACTITIONER

## 2025-05-13 PROCEDURE — 1126F AMNT PAIN NOTED NONE PRSNT: CPT | Performed by: NURSE PRACTITIONER

## 2025-05-13 PROCEDURE — 1160F RVW MEDS BY RX/DR IN RCRD: CPT | Performed by: NURSE PRACTITIONER

## 2025-05-13 PROCEDURE — 3079F DIAST BP 80-89 MM HG: CPT | Performed by: NURSE PRACTITIONER

## 2025-05-13 RX ORDER — AMOXICILLIN 875 MG/1
TABLET, COATED ORAL
COMMUNITY
Start: 2025-05-05

## 2025-05-13 RX ORDER — ALBUTEROL SULFATE 90 UG/1
INHALANT RESPIRATORY (INHALATION)
COMMUNITY
Start: 2025-05-06

## 2025-05-13 NOTE — PROGRESS NOTES
"Chief Complaint  Results    Subjective        Ramy Reyez presents to [facility]  History of Present Illness    History of Present Illness  The patient presents for evaluation of elevated creatinine, urinary protein, INR, and a persistent cough.    He reports a weight loss of approximately 40 pounds, which he attributes to his current medication regimen. He denies experiencing chest pain or heart palpitations. His daily routine includes walking, although he acknowledges that his heart rate may not be as elevated as it should be during these walks.    He admits to inadequate water intake due to frequent urination, which he believes may have led to dehydration over the past week. He is currently undergoing urological evaluations, including discussions about potential implant placement to improve incontinence. His most recent procedure involved an examination of his bladder and prostate, with no abnormalities detected. He has a scheduled appointment with his urologist this coming Friday.    He has been experiencing a persistent cough for several weeks, which is particularly noticeable upon waking in the morning. He sought treatment at a local clinic where he was prescribed amoxicillin, a course he is still completing. He also reported concurrent diarrhea, but it is unclear whether this was a side effect of the medication or a symptom of his illness. The diarrhea has since resolved. He has 3 more days of amoxicillin left.    His INR levels have been within the normal range, with the exception of the most recent reading, which was elevated at 3.2.       Objective   Vital Signs:  /82   Pulse 86   Temp 97 °F (36.1 °C) (Infrared)   Resp 14   Ht 193 cm (75.98\")   Wt (!) 143 kg (314 lb 4.8 oz)   SpO2 96%   BMI 38.28 kg/m²   Estimated body mass index is 38.28 kg/m² as calculated from the following:    Height as of this encounter: 193 cm (75.98\").    Weight as of this encounter: 143 " kg (314 lb 4.8 oz).       Class 2 Severe Obesity (BMI >=35 and <=39.9). Obesity-related health conditions include the following: obstructive sleep apnea, hypertension, diabetes mellitus, and dyslipidemias. Obesity is improving with treatment. BMI is is above average; BMI management plan is completed. We discussed portion control, increasing exercise, and Information on healthy weight added to patient's after visit summary.      Physical Exam  Vitals and nursing note reviewed.   Constitutional:       General: He is not in acute distress.     Appearance: He is well-developed. He is not ill-appearing or diaphoretic.   HENT:      Head: Normocephalic and atraumatic.   Eyes:      General:         Right eye: No discharge.         Left eye: No discharge.      Conjunctiva/sclera: Conjunctivae normal.   Cardiovascular:      Rate and Rhythm: Normal rate. Rhythm irregularly irregular.   Pulmonary:      Effort: Pulmonary effort is normal.      Breath sounds: Normal breath sounds.   Abdominal:      General: Bowel sounds are normal. There is no distension.      Palpations: Abdomen is soft.      Tenderness: There is no abdominal tenderness.   Musculoskeletal:         General: No deformity.      Cervical back: Neck supple.      Comments: Gait smooth and steady   Lymphadenopathy:      Cervical: No cervical adenopathy.   Skin:     General: Skin is warm and dry.   Neurological:      Mental Status: He is alert and oriented to person, place, and time.   Psychiatric:         Mood and Affect: Mood normal.         Behavior: Behavior normal.          Physical Exam       Result Review :            Results  Labs-from 4/24/2025   - Urine Protein: Protein in urine had gone up   - A1c: In the prediabetic range   - Creatinine: Increased   - INR: High at 3.2                Assessment and Plan     Diagnoses and all orders for this visit:    1. Elevated serum creatinine (Primary)  -     Urinalysis With Microscopic - Urine, Clean Catch  -     Basic  metabolic panel    2. Proteinuria, unspecified type    3. Diabetes mellitus without complication    4. Anticoagulated by anticoagulation treatment    Other orders  -     Microscopic Examination -        Assessment & Plan  1. Elevated creatinine.  - Significant increase in creatinine levels, indicating potential kidney dysfunction.  - Reports possible dehydration for a week due to insufficient water intake.  - Adequate hydration advised and avoidance of medications like ibuprofen that can impair kidney function.  - Kidney function re-evaluation to be conducted today.    2. Proteinuria.  - Increase in protein levels in urine observed.  - Possible relation to uncontrolled blood pressure or blood sugar levels; both seem to be well-controlled however   -A1c remains in prediabetic range.  -Hypertension appears well-controlled  - Advised to monitor blood pressure and blood sugar levels closely.  - Further evaluation based on today's lab results.  - Not on ACE    3. Cough.  - Persistent cough for a couple of weeks, improved but still present in the morning.  - Previously treated with amoxicillin, which caused diarrhea that has since resolved.  - Advised to consume yogurt daily to restore gut clifton disrupted by the antibiotic.    4. Elevated INR.  - INR noted to be high at 3.2.  - Advised to continue monitoring INR levels and adjust medication dosage as necessary-may be due to recent antibiotics, illness  -Followed by Coumadin clinic            Follow Up     No follow-ups on file.  Patient was given instructions and counseling regarding his condition or for health maintenance advice. Please see specific information pulled into the AVS if appropriate.    Patient or patient representative verbalized consent for the use of Ambient Listening during the visit with  HARDY Ortega for chart documentation. 6/2/2025  06:13 EDT

## 2025-05-14 LAB
APPEARANCE UR: CLEAR
BACTERIA #/AREA URNS HPF: NORMAL /HPF
BILIRUB UR QL STRIP: NEGATIVE
BUN SERPL-MCNC: 13 MG/DL (ref 8–23)
BUN/CREAT SERPL: 13.1 (ref 7–25)
CALCIUM SERPL-MCNC: 9.4 MG/DL (ref 8.6–10.5)
CASTS URNS MICRO: NORMAL
CHLORIDE SERPL-SCNC: 101 MMOL/L (ref 98–107)
CO2 SERPL-SCNC: 27 MMOL/L (ref 22–29)
COLOR UR: YELLOW
CREAT SERPL-MCNC: 0.99 MG/DL (ref 0.76–1.27)
EGFRCR SERPLBLD CKD-EPI 2021: 84 ML/MIN/1.73
EPI CELLS #/AREA URNS HPF: NORMAL /HPF
GLUCOSE SERPL-MCNC: 74 MG/DL (ref 65–99)
GLUCOSE UR QL STRIP: NEGATIVE
HGB UR QL STRIP: NEGATIVE
KETONES UR QL STRIP: NEGATIVE
LEUKOCYTE ESTERASE UR QL STRIP: NEGATIVE
NITRITE UR QL STRIP: NEGATIVE
PH UR STRIP: 6.5 [PH] (ref 5–8)
POTASSIUM SERPL-SCNC: 3.8 MMOL/L (ref 3.5–5.2)
PROT UR QL STRIP: NEGATIVE
RBC #/AREA URNS HPF: NORMAL /HPF
SODIUM SERPL-SCNC: 141 MMOL/L (ref 136–145)
SP GR UR STRIP: NORMAL (ref 1–1.03)
UROBILINOGEN UR STRIP-MCNC: NORMAL MG/DL
WBC #/AREA URNS HPF: NORMAL /HPF

## 2025-05-14 RX ORDER — WARFARIN SODIUM 5 MG/1
TABLET ORAL
Qty: 65 TABLET | Refills: 1 | Status: SHIPPED | OUTPATIENT
Start: 2025-05-14

## 2025-05-19 ENCOUNTER — ANTICOAGULATION VISIT (OUTPATIENT)
Dept: PHARMACY | Facility: HOSPITAL | Age: 65
End: 2025-05-19
Payer: MEDICARE

## 2025-05-19 DIAGNOSIS — I48.21 PERMANENT ATRIAL FIBRILLATION: Primary | ICD-10-CM

## 2025-05-19 LAB
INR PPP: 3.2 (ref 0.91–1.09)
PROTHROMBIN TIME: 38.3 SECONDS (ref 10–13.8)

## 2025-05-19 PROCEDURE — G0463 HOSPITAL OUTPT CLINIC VISIT: HCPCS

## 2025-05-19 PROCEDURE — 85610 PROTHROMBIN TIME: CPT

## 2025-05-19 NOTE — PROGRESS NOTES
I have supervised and reviewed the notes, assessments, and/or procedures performed. The documented assessment and plan were developed cooperatively, and the plan was implemented in my presence. I concur with the documentation of this patient encounter.    Margret Dinh, [facility]

## 2025-05-19 NOTE — PROGRESS NOTES
"Anticoagulation Clinic Progress Note    Anticoagulation Summary  As of 5/19/2025      INR goal:  2.0-3.0   TTR:  60.9% (3.7 mo)   INR used for dosing:  3.2 (5/19/2025)   Warfarin maintenance plan:  5 mg every Mon, Wed, Fri; 7.5 mg all other days   Weekly warfarin total:  45 mg   No change documented:  Laurel Ivan   Plan last modified:  Pete Colon, PharmD (2/17/2025)   Next INR check:  6/5/2025   Target end date:  --    Indications    Permanent atrial fibrillation [I48.21]                 Anticoagulation Episode Summary       INR check location:  --    Preferred lab:  --    Send INR reminders to:   [facility] [place]    Comments:  Last day of xarelto on 1/19- start warfarin 5 mg daily and first INR on 1/24           Anticoagulation Care Providers       Provider Role Specialty Phone number    StephanieHayden Gunderson, EVANS Referring Physician Assistant [phone]            Clinic Interview:  Patient Findings     Positives:  Change in medications    Negatives:  Signs/symptoms of thrombosis, Signs/symptoms of bleeding,   Laboratory test error suspected, Change in health, Change in alcohol use,   Change in activity, Upcoming invasive procedure, Emergency department   visit, Upcoming dental procedure, Missed doses, Extra doses, Change in   diet/appetite, Hospital admission, Bruising, Other complaints    Comments:  Pt was prescribed 10-day course of amoxicillin and 6-day   medrol dose pack on 5/5/25; pt reports experiencing tachycardia and   \"heart-pounding\" upon recent albuterol use + physical exertion; pt reports   no other changes      Clinical Outcomes     Negatives:  Major bleeding event, Thromboembolic event,   Anticoagulation-related hospital admission, Anticoagulation-related ED   visit, Anticoagulation-related fatality    Comments:  Pt was prescribed 10-day course of amoxicillin and 6-day   medrol dose pack on 5/5/25; pt reports experiencing tachycardia and   \"heart-pounding\" upon recent albuterol " use + physical exertion; pt reports   no other changes        INR History:      2/3/2025     1:15 PM 2/17/2025     1:15 PM 3/7/2025     2:00 PM 3/21/2025     2:15 PM 4/21/2025     2:15 PM 5/5/2025     3:00 PM 5/19/2025     3:00 PM   Anticoagulation Monitoring   INR 2.4 1.9 2.2 2.6 1.6 3.2 3.2   INR Date 2/3/2025 2/17/2025 3/7/2025 3/21/2025 4/21/2025 5/5/2025 5/19/2025   INR Goal 2.0-3.0 2.0-3.0 2.0-3.0 2.0-3.0 2.0-3.0 2.0-3.0 2.0-3.0   Trend Same Up Same Same Same Same Same   Last Week Total 42.5 mg 42.5 mg 45 mg 45 mg 45 mg 45 mg 45 mg   Next Week Total 42.5 mg 47.5 mg 45 mg 45 mg 47.5 mg 45 mg 45 mg   Sun 5 mg 7.5 mg 7.5 mg 7.5 mg 7.5 mg 7.5 mg 7.5 mg   Mon 5 mg 7.5 mg (2/17); Otherwise 5 mg 5 mg 5 mg 5 mg 5 mg 5 mg   Tue 7.5 mg 7.5 mg 7.5 mg 7.5 mg 10 mg (4/22); Otherwise 7.5 mg 7.5 mg 7.5 mg   Wed 5 mg 5 mg 5 mg 5 mg 5 mg 5 mg 5 mg   Thu 7.5 mg 7.5 mg 7.5 mg 7.5 mg 7.5 mg 7.5 mg 7.5 mg   Fri 5 mg 5 mg 5 mg 5 mg 5 mg 5 mg 5 mg   Sat 7.5 mg 7.5 mg 7.5 mg 7.5 mg 7.5 mg 7.5 mg 7.5 mg       Plan:  1. INR is Supratherapeutic today- see above in Anticoagulation Summary.  Will instruct Ramy Reyez to Continue their warfarin regimen as it is likely still elevated from Medrol dose pack from 5/5/25- see above in Anticoagulation Summary.  2. Follow up in 2.5 weeks  3. Patient declines warfarin refills.  4. Verbal and written information provided. Patient expresses understanding and has no further questions at this time.    Laurel Ivan

## 2025-06-02 PROBLEM — R73.9 HYPERGLYCEMIA: Status: RESOLVED | Noted: 2020-06-17 | Resolved: 2025-06-02

## 2025-06-02 PROBLEM — M79.604 RIGHT LEG PAIN: Status: RESOLVED | Noted: 2024-05-23 | Resolved: 2025-06-02

## 2025-06-02 PROBLEM — I83.891 VARICOSE VEINS OF RIGHT LOWER EXTREMITY WITH COMPLICATIONS: Status: RESOLVED | Noted: 2024-05-23 | Resolved: 2025-06-02

## 2025-06-02 PROBLEM — S30.22XA SCROTAL HEMATOMA: Status: RESOLVED | Noted: 2022-10-14 | Resolved: 2025-06-02

## 2025-06-02 PROBLEM — M79.89 SWELLING OF LIMB: Status: RESOLVED | Noted: 2024-05-23 | Resolved: 2025-06-02

## 2025-06-02 PROBLEM — E66.813 OBESITY, CLASS III, BMI 40-49.9 (MORBID OBESITY): Status: RESOLVED | Noted: 2022-07-27 | Resolved: 2025-06-02

## 2025-06-02 PROBLEM — E66.01 OBESITY, CLASS III, BMI 40-49.9 (MORBID OBESITY): Status: RESOLVED | Noted: 2022-07-27 | Resolved: 2025-06-02

## 2025-06-05 DIAGNOSIS — M79.604 RIGHT LEG PAIN: ICD-10-CM

## 2025-06-05 DIAGNOSIS — M79.89 SWELLING OF LIMB: ICD-10-CM

## 2025-06-05 DIAGNOSIS — I83.891 VARICOSE VEINS OF RIGHT LOWER EXTREMITY WITH COMPLICATIONS: Primary | ICD-10-CM

## 2025-06-09 ENCOUNTER — ANTICOAGULATION VISIT (OUTPATIENT)
Dept: PHARMACY | Facility: HOSPITAL | Age: 65
End: 2025-06-09
Payer: MEDICARE

## 2025-06-09 DIAGNOSIS — I48.21 PERMANENT ATRIAL FIBRILLATION: Primary | ICD-10-CM

## 2025-06-09 LAB
INR PPP: 2 (ref 0.91–1.09)
PROTHROMBIN TIME: 23.5 SECONDS (ref 10–13.8)

## 2025-06-09 PROCEDURE — 85610 PROTHROMBIN TIME: CPT

## 2025-06-09 PROCEDURE — G0463 HOSPITAL OUTPT CLINIC VISIT: HCPCS

## 2025-06-09 NOTE — PROGRESS NOTES
I have supervised and reviewed the notes, assessments, and/or procedures performed. The documented assessment and plan were developed cooperatively, and the plan was implemented in my presence. I concur with the documentation of this patient encounter.    Mr. Reyez was instructed to seek immediate medical attention if he experiences loss of vision or if eye becomes painful.     Pete Colon, PharmD

## 2025-06-09 NOTE — PROGRESS NOTES
Anticoagulation Clinic Progress Note    Anticoagulation Summary  As of 2025      INR goal:  2.0-3.0   TTR:  64.4% (4.4 mo)   INR used for dosin.0 (2025)   Warfarin maintenance plan:  5 mg every Mon, Wed, Fri; 7.5 mg all other days   Weekly warfarin total:  45 mg   No change documented:  Laurel Ivan   Plan last modified:  Pete Colon, PharmD (2025)   Next INR check:  2025   Target end date:  --    Indications    Permanent atrial fibrillation [I48.21]                 Anticoagulation Episode Summary       INR check location:  --    Preferred lab:  --    Send INR reminders to:   [facility] [place]    Comments:  Last day of xarelto on - start warfarin 5 mg daily and first INR on            Anticoagulation Care Providers       Provider Role Specialty Phone number    Hayden Manzanares PA-C Referring Physician Assistant [phone]            Clinic Interview:  Patient Findings     Positives:  Other complaints    Negatives:  Signs/symptoms of thrombosis, Signs/symptoms of bleeding,   Laboratory test error suspected, Change in health, Change in alcohol use,   Change in activity, Upcoming invasive procedure, Emergency department   visit, Upcoming dental procedure, Missed doses, Extra doses, Change in   medications, Change in diet/appetite, Hospital admission, Bruising    Comments:  Pt had symptoms of floaters/dark spots in R eye vision, saw   optometrist and was determined to have detached vitreous with blood   inside, pt to follow up with ophthalmologist tomorrow; pt reports it has   gotten better in past few days, though there are some floaters/blurriness;   pt reports no other changes      Clinical Outcomes     Negatives:  Major bleeding event, Thromboembolic event,   Anticoagulation-related hospital admission, Anticoagulation-related ED   visit, Anticoagulation-related fatality    Comments:  Pt had symptoms of floaters/dark spots in R eye vision, saw   optometrist and  was determined to have detached vitreous with blood   inside, pt to follow up with ophthalmologist tomorrow; pt reports it has   gotten better in past few days, though there are some floaters/blurriness;   pt reports no other changes        INR History:      2/17/2025     1:15 PM 3/7/2025     2:00 PM 3/21/2025     2:15 PM 4/21/2025     2:15 PM 5/5/2025     3:00 PM 5/19/2025     3:00 PM 6/9/2025     2:45 PM   Anticoagulation Monitoring   INR 1.9 2.2 2.6 1.6 3.2 3.2 2.0   INR Date 2/17/2025 3/7/2025 3/21/2025 4/21/2025 5/5/2025 5/19/2025 6/9/2025   INR Goal 2.0-3.0 2.0-3.0 2.0-3.0 2.0-3.0 2.0-3.0 2.0-3.0 2.0-3.0   Trend Up Same Same Same Same Same Same   Last Week Total 42.5 mg 45 mg 45 mg 45 mg 45 mg 45 mg 45 mg   Next Week Total 47.5 mg 45 mg 45 mg 47.5 mg 45 mg 45 mg 45 mg   Sun 7.5 mg 7.5 mg 7.5 mg 7.5 mg 7.5 mg 7.5 mg 7.5 mg   Mon 7.5 mg (2/17); Otherwise 5 mg 5 mg 5 mg 5 mg 5 mg 5 mg 5 mg   Tue 7.5 mg 7.5 mg 7.5 mg 10 mg (4/22); Otherwise 7.5 mg 7.5 mg 7.5 mg 7.5 mg   Wed 5 mg 5 mg 5 mg 5 mg 5 mg 5 mg 5 mg   Thu 7.5 mg 7.5 mg 7.5 mg 7.5 mg 7.5 mg 7.5 mg 7.5 mg   Fri 5 mg 5 mg 5 mg 5 mg 5 mg 5 mg 5 mg   Sat 7.5 mg 7.5 mg 7.5 mg 7.5 mg 7.5 mg 7.5 mg 7.5 mg       Plan:  1. INR is Therapeutic today- see above in Anticoagulation Summary.  Will instruct Ramy Templetonud to Continue their warfarin regimen- see above in Anticoagulation Summary.  2. Follow up in 2 weeks  3. Patient declines warfarin refills.  4. Verbal and written information provided. Patient expresses understanding and has no further questions at this time.    Laurel Ivan

## 2025-06-23 ENCOUNTER — ANTICOAGULATION VISIT (OUTPATIENT)
Dept: PHARMACY | Facility: HOSPITAL | Age: 65
End: 2025-06-23
Payer: MEDICARE

## 2025-06-23 DIAGNOSIS — I48.21 PERMANENT ATRIAL FIBRILLATION: Primary | ICD-10-CM

## 2025-06-23 LAB
INR PPP: 2.1 (ref 0.91–1.09)
PROTHROMBIN TIME: 24.9 SECONDS (ref 10–13.8)

## 2025-06-23 PROCEDURE — 85610 PROTHROMBIN TIME: CPT

## 2025-06-23 PROCEDURE — G0463 HOSPITAL OUTPT CLINIC VISIT: HCPCS

## 2025-06-23 NOTE — PROGRESS NOTES
Anticoagulation Clinic Progress Note    Anticoagulation Summary  As of 2025      INR goal:  2.0-3.0   TTR:  67.8% (4.9 mo)   INR used for dosin.1 (2025)   Warfarin maintenance plan:  5 mg every Mon, Wed, Fri; 7.5 mg all other days   Weekly warfarin total:  45 mg   No change documented:  Pete Colon PharmD   Plan last modified:  Pete Colon PharmD (2025)   Next INR check:  2025   Target end date:  --    Indications    Permanent atrial fibrillation [I48.21]                 Anticoagulation Episode Summary       INR check location:  --    Preferred lab:  --    Send INR reminders to:   [facility] [place]    Comments:  Last day of xarelto on - start warfarin 5 mg daily and first INR on            Anticoagulation Care Providers       Provider Role Specialty Phone number    StephanieHayden Gunderson, EVANS Referring Physician Assistant [phone]            Clinic Interview:  Patient Findings     Negatives:  Signs/symptoms of thrombosis, Signs/symptoms of bleeding,   Laboratory test error suspected, Change in health, Change in alcohol use,   Change in activity, Upcoming invasive procedure, Emergency department   visit, Upcoming dental procedure, Missed doses, Extra doses, Change in   medications, Change in diet/appetite, Hospital admission, Bruising, Other   complaints    Comments:  Reports floaters/vision is improving to some degree. He has   ongoing follow up with his optometrist/ophthalmologist.       Clinical Outcomes     Negatives:  Major bleeding event, Thromboembolic event,   Anticoagulation-related hospital admission, Anticoagulation-related ED   visit, Anticoagulation-related fatality    Comments:  Reports floaters/vision is improving to some degree. He has   ongoing follow up with his optometrist/ophthalmologist.         INR History:      3/7/2025     2:00 PM 3/21/2025     2:15 PM 2025     2:15 PM 2025     3:00 PM 2025     3:00 PM 2025     2:45 PM  6/23/2025     3:30 PM   Anticoagulation Monitoring   INR 2.2 2.6 1.6 3.2 3.2 2.0 2.1   INR Date 3/7/2025 3/21/2025 4/21/2025 5/5/2025 5/19/2025 6/9/2025 6/23/2025   INR Goal 2.0-3.0 2.0-3.0 2.0-3.0 2.0-3.0 2.0-3.0 2.0-3.0 2.0-3.0   Trend Same Same Same Same Same Same Same   Last Week Total 45 mg 45 mg 45 mg 45 mg 45 mg 45 mg 45 mg   Next Week Total 45 mg 45 mg 47.5 mg 45 mg 45 mg 45 mg 45 mg   Sun 7.5 mg 7.5 mg 7.5 mg 7.5 mg 7.5 mg 7.5 mg 7.5 mg   Mon 5 mg 5 mg 5 mg 5 mg 5 mg 5 mg 5 mg   Tue 7.5 mg 7.5 mg 10 mg (4/22); Otherwise 7.5 mg 7.5 mg 7.5 mg 7.5 mg 7.5 mg   Wed 5 mg 5 mg 5 mg 5 mg 5 mg 5 mg 5 mg   Thu 7.5 mg 7.5 mg 7.5 mg 7.5 mg 7.5 mg 7.5 mg 7.5 mg   Fri 5 mg 5 mg 5 mg 5 mg 5 mg 5 mg 5 mg   Sat 7.5 mg 7.5 mg 7.5 mg 7.5 mg 7.5 mg 7.5 mg 7.5 mg   Visit Report Report             Plan:  1. INR is Therapeutic today- see above in Anticoagulation Summary.  Will instruct Ramy Reyez to Continue their warfarin regimen- see above in Anticoagulation Summary.  2. Follow up in 4 weeks  3. Patient declines warfarin refills.  4. Verbal and written information provided. Patient expresses understanding and has no further questions at this time.    Pete Colon, PharmD

## 2025-06-27 DIAGNOSIS — R35.0 URINARY FREQUENCY: ICD-10-CM

## 2025-06-27 NOTE — TELEPHONE ENCOUNTER
Rx Refill Note  Requested Prescriptions     Pending Prescriptions Disp Refills    tamsulosin (FLOMAX) 0.4 MG capsule 24 hr capsule [Pharmacy Med Name: TAMSULOSIN 0.4MG CAPSULES] 90 capsule 1     Sig: TAKE 1 CAPSULE BY MOUTH DAILY      Last office visit with prescribing clinician: 5/13/2025   Last telemedicine visit with prescribing clinician: Visit date not found   Next office visit with prescribing clinician: Visit date not found                         Would you like a call back once the refill request has been completed: [] Yes [] No    If the office needs to give you a call back, can they leave a voicemail: [] Yes [] No    Mario Alberto Woodard Rep  06/27/25, 09:23 EDT

## 2025-06-30 RX ORDER — TAMSULOSIN HYDROCHLORIDE 0.4 MG/1
1 CAPSULE ORAL DAILY
Qty: 90 CAPSULE | Refills: 1 | Status: SHIPPED | OUTPATIENT
Start: 2025-06-30

## 2025-07-22 ENCOUNTER — ANTICOAGULATION VISIT (OUTPATIENT)
Dept: PHARMACY | Facility: HOSPITAL | Age: 65
End: 2025-07-22
Payer: MEDICARE

## 2025-07-22 DIAGNOSIS — I48.21 PERMANENT ATRIAL FIBRILLATION: Primary | ICD-10-CM

## 2025-07-22 LAB
INR PPP: 2 (ref 0.91–1.09)
PROTHROMBIN TIME: 23.8 SECONDS (ref 10–13.8)

## 2025-07-22 PROCEDURE — 85610 PROTHROMBIN TIME: CPT

## 2025-07-22 PROCEDURE — G0463 HOSPITAL OUTPT CLINIC VISIT: HCPCS

## 2025-07-22 NOTE — PROGRESS NOTES
Anticoagulation Clinic Progress Note    Anticoagulation Summary  As of 2025      INR goal:  2.0-3.0   TTR:  73.1% (5.9 mo)   INR used for dosin.0 (2025)   Warfarin maintenance plan:  5 mg every Mon, Wed, Fri; 7.5 mg all other days   Weekly warfarin total:  45 mg   No change documented:  Selena Goodwin   Plan last modified:  Pete Colon, PharmD (2025)   Next INR check:  2025   Target end date:  --    Indications    Permanent atrial fibrillation [I48.21]                 Anticoagulation Episode Summary       INR check location:  --    Preferred lab:  --    Send INR reminders to:   [facility] [place]    Comments:  Last day of xarelto on - start warfarin 5 mg daily and first INR on            Anticoagulation Care Providers       Provider Role Specialty Phone number    StephanieHayden Gunderson, EVANS Referring Physician Assistant [phone]            Clinic Interview:  Patient Findings     Positives:  Change in medications    Negatives:  Signs/symptoms of thrombosis, Signs/symptoms of bleeding,   Laboratory test error suspected, Change in health, Change in alcohol use,   Change in activity, Upcoming invasive procedure, Emergency department   visit, Upcoming dental procedure, Missed doses, Extra doses, Change in   diet/appetite, Hospital admission, Bruising, Other complaints    Comments:   Started 2nd round of penicillin        Clinical Outcomes     Negatives:  Major bleeding event, Thromboembolic event,   Anticoagulation-related hospital admission, Anticoagulation-related ED   visit, Anticoagulation-related fatality    Comments:   Started 2nd round of penicillin          INR History:      3/21/2025     2:15 PM 2025     2:15 PM 2025     3:00 PM 2025     3:00 PM 2025     2:45 PM 2025     3:30 PM 2025    11:15 AM   Anticoagulation Monitoring   INR 2.6 1.6 3.2 3.2 2.0 2.1 2.0   INR Date 3/21/2025 2025 2025 2025 2025  6/23/2025 7/22/2025   INR Goal 2.0-3.0 2.0-3.0 2.0-3.0 2.0-3.0 2.0-3.0 2.0-3.0 2.0-3.0   Trend Same Same Same Same Same Same Same   Last Week Total 45 mg 45 mg 45 mg 45 mg 45 mg 45 mg 45 mg   Next Week Total 45 mg 47.5 mg 45 mg 45 mg 45 mg 45 mg 45 mg   Sun 7.5 mg 7.5 mg 7.5 mg 7.5 mg 7.5 mg 7.5 mg 7.5 mg   Mon 5 mg 5 mg 5 mg 5 mg 5 mg 5 mg 5 mg   Tue 7.5 mg 10 mg (4/22); Otherwise 7.5 mg 7.5 mg 7.5 mg 7.5 mg 7.5 mg 7.5 mg   Wed 5 mg 5 mg 5 mg 5 mg 5 mg 5 mg 5 mg   Thu 7.5 mg 7.5 mg 7.5 mg 7.5 mg 7.5 mg 7.5 mg 7.5 mg   Fri 5 mg 5 mg 5 mg 5 mg 5 mg 5 mg 5 mg   Sat 7.5 mg 7.5 mg 7.5 mg 7.5 mg 7.5 mg 7.5 mg 7.5 mg       Plan:  1. INR is therapeutic today- see above in Anticoagulation Summary.   Will instruct Ramy Reyez to continue their warfarin regimen- see above in Anticoagulation Summary.  2. Follow up in 4 weeks.  3. Patient declines warfarin refills.  4. Verbal and written information provided. Patient expresses understanding and has no further questions at this time.    Selena Goodwin

## 2025-07-22 NOTE — PROGRESS NOTES
I have supervised and reviewed the notes, assessments, and/or procedures performed. I personally performed the assessment and implemented the plan. I concur with the documentation of this patient encounter.    Margret Dinh, [facility]

## 2025-07-24 ENCOUNTER — APPOINTMENT (OUTPATIENT)
Dept: PHARMACY | Facility: HOSPITAL | Age: 65
End: 2025-07-24
Payer: MEDICARE

## 2025-08-13 ENCOUNTER — TELEPHONE (OUTPATIENT)
Age: 65
End: 2025-08-13
Payer: COMMERCIAL

## 2025-08-19 ENCOUNTER — ANTICOAGULATION VISIT (OUTPATIENT)
Dept: PHARMACY | Facility: HOSPITAL | Age: 65
End: 2025-08-19
Payer: MEDICARE

## 2025-08-19 DIAGNOSIS — I48.21 PERMANENT ATRIAL FIBRILLATION: Primary | ICD-10-CM

## 2025-08-19 LAB
INR PPP: 2.6 (ref 0.91–1.09)
PROTHROMBIN TIME: 30.9 SECONDS (ref 10–13.8)

## 2025-08-19 PROCEDURE — 85610 PROTHROMBIN TIME: CPT

## 2025-08-19 PROCEDURE — G0463 HOSPITAL OUTPT CLINIC VISIT: HCPCS

## 2025-08-28 ENCOUNTER — HOSPITAL ENCOUNTER (OUTPATIENT)
Dept: CARDIOLOGY | Facility: HOSPITAL | Age: 65
Discharge: HOME | End: 2025-08-28
Payer: MEDICARE

## 2025-08-28 ENCOUNTER — LAB (OUTPATIENT)
Dept: LAB | Facility: HOSPITAL | Age: 65
End: 2025-08-28
Payer: MEDICARE

## 2025-08-28 DIAGNOSIS — N39.41 URGE INCONTINENCE: ICD-10-CM

## 2025-08-28 DIAGNOSIS — R35.0 URINARY FREQUENCY: ICD-10-CM

## 2025-08-28 LAB
ANION GAP SERPL CALCULATED.3IONS-SCNC: 13 MMOL/L (ref 5–15)
BASOPHILS # BLD AUTO: 0.03 10*3/MM3 (ref 0–0.2)
BASOPHILS NFR BLD AUTO: 0.6 % (ref 0–1.5)
BUN SERPL-MCNC: 15 MG/DL (ref 8–23)
BUN/CREAT SERPL: 16.3 (ref 7–25)
CALCIUM SPEC-SCNC: 9.4 MG/DL (ref 8.6–10.5)
CHLORIDE SERPL-SCNC: 104 MMOL/L (ref 98–107)
CO2 SERPL-SCNC: 23 MMOL/L (ref 22–29)
CREAT SERPL-MCNC: 0.92 MG/DL (ref 0.76–1.27)
DEPRECATED RDW RBC AUTO: 41.6 FL (ref 37–54)
EGFRCR SERPLBLD CKD-EPI 2021: 91.7 ML/MIN/1.73
EOSINOPHIL # BLD AUTO: 0.12 10*3/MM3 (ref 0–0.4)
EOSINOPHIL NFR BLD AUTO: 2.3 % (ref 0.3–6.2)
ERYTHROCYTE [DISTWIDTH] IN BLOOD BY AUTOMATED COUNT: 13.5 % (ref 12.3–15.4)
GLUCOSE SERPL-MCNC: 95 MG/DL (ref 65–99)
HCT VFR BLD AUTO: 45.2 % (ref 37.5–51)
HGB BLD-MCNC: 14.9 G/DL (ref 13–17.7)
IMM GRANULOCYTES # BLD AUTO: 0.02 10*3/MM3 (ref 0–0.05)
IMM GRANULOCYTES NFR BLD AUTO: 0.4 % (ref 0–0.5)
LYMPHOCYTES # BLD AUTO: 1.43 10*3/MM3 (ref 0.7–3.1)
LYMPHOCYTES NFR BLD AUTO: 27.2 % (ref 19.6–45.3)
MCH RBC QN AUTO: 28.3 PG (ref 26.6–33)
MCHC RBC AUTO-ENTMCNC: 33 G/DL (ref 31.5–35.7)
MCV RBC AUTO: 85.8 FL (ref 79–97)
MONOCYTES # BLD AUTO: 0.61 10*3/MM3 (ref 0.1–0.9)
MONOCYTES NFR BLD AUTO: 11.6 % (ref 5–12)
NEUTROPHILS NFR BLD AUTO: 3.05 10*3/MM3 (ref 1.7–7)
NEUTROPHILS NFR BLD AUTO: 57.9 % (ref 42.7–76)
NRBC BLD AUTO-RTO: 0 /100 WBC (ref 0–0.2)
PLATELET # BLD AUTO: 179 10*3/MM3 (ref 140–450)
PMV BLD AUTO: 9.8 FL (ref 6–12)
POTASSIUM SERPL-SCNC: 4.1 MMOL/L (ref 3.5–5.2)
QT INTERVAL: 401 MS
QTC INTERVAL: 481 MS
RBC # BLD AUTO: 5.27 10*6/MM3 (ref 4.14–5.8)
SODIUM SERPL-SCNC: 140 MMOL/L (ref 136–145)
WBC NRBC COR # BLD AUTO: 5.26 10*3/MM3 (ref 3.4–10.8)

## 2025-08-28 PROCEDURE — 36415 COLL VENOUS BLD VENIPUNCTURE: CPT

## 2025-08-28 PROCEDURE — 85025 COMPLETE CBC W/AUTO DIFF WBC: CPT

## 2025-08-28 PROCEDURE — 80048 BASIC METABOLIC PNL TOTAL CA: CPT

## 2025-08-28 PROCEDURE — 93005 ELECTROCARDIOGRAM TRACING: CPT | Performed by: UROLOGY

## (undated) DEVICE — BW-412T DISP COMBO CLEANING BRUSH: Brand: SINGLE USE COMBINATION CLEANING BRUSH

## (undated) DEVICE — ADAPT CLN BIOGUARD AIR/H2O DISP

## (undated) DEVICE — THE SINGLE USE ETRAP – POLYP TRAP IS USED FOR SUCTION RETRIEVAL OF ENDOSCOPICALLY REMOVED POLYPS.: Brand: ETRAP

## (undated) DEVICE — FRCP BX RADJAW4 NDL 2.8 240CM LG OG BX40

## (undated) DEVICE — SOL IRR H2O BTL 1000ML STRL

## (undated) DEVICE — SYR LL 3CC

## (undated) DEVICE — SAFELINER SUCTION CANISTER 1000CC: Brand: DEROYAL

## (undated) DEVICE — PATIENT RETURN ELECTRODE, SINGLE-USE, CONTACT QUALITY MONITORING, ADULT, WITH 9FT CORD, FOR PATIENTS WEIGING OVER 33LBS. (15KG): Brand: MEGADYNE

## (undated) DEVICE — VIAL FORMALIN CAP 10P 40ML

## (undated) DEVICE — GOWN ,SIRUS,NONREINFORCED 3XL: Brand: MEDLINE

## (undated) DEVICE — SNAR POLYP CAPTIFLX MICRO OVL 13MM 240CM

## (undated) DEVICE — SPNG GZ WOVN 4X4IN 12PLY 10/BX STRL

## (undated) DEVICE — KT ORCA ORCAPOD DISP STRL

## (undated) DEVICE — Device